# Patient Record
Sex: MALE | Race: WHITE | Employment: OTHER | URBAN - METROPOLITAN AREA
[De-identification: names, ages, dates, MRNs, and addresses within clinical notes are randomized per-mention and may not be internally consistent; named-entity substitution may affect disease eponyms.]

---

## 2018-06-14 ENCOUNTER — TRANSFERRED RECORDS (OUTPATIENT)
Dept: HEALTH INFORMATION MANAGEMENT | Facility: CLINIC | Age: 63
End: 2018-06-14

## 2018-06-19 ENCOUNTER — TRANSFERRED RECORDS (OUTPATIENT)
Dept: HEALTH INFORMATION MANAGEMENT | Facility: CLINIC | Age: 63
End: 2018-06-19

## 2018-06-29 ENCOUNTER — TELEPHONE (OUTPATIENT)
Dept: SLEEP MEDICINE | Facility: CLINIC | Age: 63
End: 2018-06-29

## 2018-06-29 NOTE — TELEPHONE ENCOUNTER
patient is calling to request an prescription for a new cpap machine. Please follow up with patient.

## 2018-06-29 NOTE — TELEPHONE ENCOUNTER
Last office visit: 01/15/2015  Contacted Ludin via telephone call.   His current durable medical equipment supplier is [employer]. Ludin received a [employer] machine on 12/02/2014 and is not yet eligible for a replacement machine. Ludin informed that he is due for an appointment with our sleep specialist. He is not interested in scheduling at this time.

## 2018-07-01 ENCOUNTER — ANESTHESIA EVENT (OUTPATIENT)
Dept: SURGERY | Facility: CLINIC | Age: 63
DRG: 470 | End: 2018-07-01
Payer: COMMERCIAL

## 2018-07-09 ENCOUNTER — APPOINTMENT (OUTPATIENT)
Dept: GENERAL RADIOLOGY | Facility: CLINIC | Age: 63
DRG: 470 | End: 2018-07-09
Attending: ORTHOPAEDIC SURGERY
Payer: COMMERCIAL

## 2018-07-09 ENCOUNTER — ANESTHESIA (OUTPATIENT)
Dept: SURGERY | Facility: CLINIC | Age: 63
DRG: 470 | End: 2018-07-09
Payer: COMMERCIAL

## 2018-07-09 ENCOUNTER — HOSPITAL ENCOUNTER (INPATIENT)
Facility: CLINIC | Age: 63
LOS: 3 days | Discharge: HOME | DRG: 470 | End: 2018-07-12
Attending: ORTHOPAEDIC SURGERY | Admitting: ORTHOPAEDIC SURGERY
Payer: COMMERCIAL

## 2018-07-09 DIAGNOSIS — H10.9 BACTERIAL CONJUNCTIVITIS OF LEFT EYE: ICD-10-CM

## 2018-07-09 DIAGNOSIS — Z96.641 STATUS POST TOTAL HIP REPLACEMENT, RIGHT: Primary | ICD-10-CM

## 2018-07-09 PROBLEM — M16.9 HIP ARTHROSIS: Status: ACTIVE | Noted: 2018-07-09

## 2018-07-09 PROCEDURE — 36000063 ZZH SURGERY LEVEL 4 EA 15 ADDTL MIN: Performed by: ORTHOPAEDIC SURGERY

## 2018-07-09 PROCEDURE — 25000128 H RX IP 250 OP 636: Performed by: NURSE ANESTHETIST, CERTIFIED REGISTERED

## 2018-07-09 PROCEDURE — 12000000 ZZH R&B MED SURG/OB

## 2018-07-09 PROCEDURE — C1776 JOINT DEVICE (IMPLANTABLE): HCPCS | Performed by: ORTHOPAEDIC SURGERY

## 2018-07-09 PROCEDURE — 71000012 ZZH RECOVERY PHASE 1 LEVEL 1 FIRST HR: Performed by: ORTHOPAEDIC SURGERY

## 2018-07-09 PROCEDURE — 37000008 ZZH ANESTHESIA TECHNICAL FEE, 1ST 30 MIN: Performed by: ORTHOPAEDIC SURGERY

## 2018-07-09 PROCEDURE — 25000125 ZZHC RX 250: Performed by: NURSE ANESTHETIST, CERTIFIED REGISTERED

## 2018-07-09 PROCEDURE — 27210794 ZZH OR GENERAL SUPPLY STERILE: Performed by: ORTHOPAEDIC SURGERY

## 2018-07-09 PROCEDURE — 25000132 ZZH RX MED GY IP 250 OP 250 PS 637: Performed by: PHYSICIAN ASSISTANT

## 2018-07-09 PROCEDURE — 37000009 ZZH ANESTHESIA TECHNICAL FEE, EACH ADDTL 15 MIN: Performed by: ORTHOPAEDIC SURGERY

## 2018-07-09 PROCEDURE — 25000125 ZZHC RX 250: Performed by: PHYSICIAN ASSISTANT

## 2018-07-09 PROCEDURE — 25000132 ZZH RX MED GY IP 250 OP 250 PS 637: Performed by: ORTHOPAEDIC SURGERY

## 2018-07-09 PROCEDURE — 25000128 H RX IP 250 OP 636: Performed by: PHYSICIAN ASSISTANT

## 2018-07-09 PROCEDURE — 0SR901A REPLACEMENT OF RIGHT HIP JOINT WITH METAL SYNTHETIC SUBSTITUTE, UNCEMENTED, OPEN APPROACH: ICD-10-PCS | Performed by: ORTHOPAEDIC SURGERY

## 2018-07-09 PROCEDURE — 25800025 ZZH RX 258: Performed by: ORTHOPAEDIC SURGERY

## 2018-07-09 PROCEDURE — 40000306 ZZH STATISTIC PRE PROC ASSESS II: Performed by: ORTHOPAEDIC SURGERY

## 2018-07-09 PROCEDURE — 40000986 XR PELVIS PORT 1/2 VW

## 2018-07-09 PROCEDURE — 27110028 ZZH OR GENERAL SUPPLY NON-STERILE: Performed by: ORTHOPAEDIC SURGERY

## 2018-07-09 PROCEDURE — 36000093 ZZH SURGERY LEVEL 4 1ST 30 MIN: Performed by: ORTHOPAEDIC SURGERY

## 2018-07-09 DEVICE — IMP HEAD FEMORAL DEPUY CERAMIC 36MM +1.5MM 1365-36-310: Type: IMPLANTABLE DEVICE | Site: HIP | Status: FUNCTIONAL

## 2018-07-09 DEVICE — IMPLANTABLE DEVICE: Type: IMPLANTABLE DEVICE | Site: HIP | Status: FUNCTIONAL

## 2018-07-09 RX ORDER — ONDANSETRON 4 MG/1
4 TABLET, ORALLY DISINTEGRATING ORAL EVERY 6 HOURS PRN
Status: DISCONTINUED | OUTPATIENT
Start: 2018-07-09 | End: 2018-07-12 | Stop reason: HOSPADM

## 2018-07-09 RX ORDER — ACETAMINOPHEN 325 MG/1
975 TABLET ORAL EVERY 8 HOURS
Status: COMPLETED | OUTPATIENT
Start: 2018-07-09 | End: 2018-07-12

## 2018-07-09 RX ORDER — PROPOFOL 10 MG/ML
INJECTION, EMULSION INTRAVENOUS PRN
Status: DISCONTINUED | OUTPATIENT
Start: 2018-07-09 | End: 2018-07-09

## 2018-07-09 RX ORDER — GABAPENTIN 300 MG/1
300 CAPSULE ORAL
Status: COMPLETED | OUTPATIENT
Start: 2018-07-09 | End: 2018-07-09

## 2018-07-09 RX ORDER — HYDROXYZINE HYDROCHLORIDE 25 MG/1
25 TABLET, FILM COATED ORAL EVERY 6 HOURS PRN
Status: DISCONTINUED | OUTPATIENT
Start: 2018-07-09 | End: 2018-07-12 | Stop reason: HOSPADM

## 2018-07-09 RX ORDER — ACETAMINOPHEN 325 MG/1
650 TABLET ORAL EVERY 4 HOURS PRN
Status: DISCONTINUED | OUTPATIENT
Start: 2018-07-12 | End: 2018-07-12 | Stop reason: HOSPADM

## 2018-07-09 RX ORDER — LIDOCAINE 40 MG/G
CREAM TOPICAL
Status: DISCONTINUED | OUTPATIENT
Start: 2018-07-09 | End: 2018-07-09 | Stop reason: HOSPADM

## 2018-07-09 RX ORDER — LIDOCAINE 40 MG/G
CREAM TOPICAL
Status: DISCONTINUED | OUTPATIENT
Start: 2018-07-09 | End: 2018-07-12 | Stop reason: HOSPADM

## 2018-07-09 RX ORDER — NALOXONE HYDROCHLORIDE 0.4 MG/ML
.1-.4 INJECTION, SOLUTION INTRAMUSCULAR; INTRAVENOUS; SUBCUTANEOUS
Status: DISCONTINUED | OUTPATIENT
Start: 2018-07-09 | End: 2018-07-12 | Stop reason: HOSPADM

## 2018-07-09 RX ORDER — AMOXICILLIN 250 MG
2 CAPSULE ORAL 2 TIMES DAILY
Status: DISCONTINUED | OUTPATIENT
Start: 2018-07-09 | End: 2018-07-12 | Stop reason: HOSPADM

## 2018-07-09 RX ORDER — PROPOFOL 10 MG/ML
INJECTION, EMULSION INTRAVENOUS CONTINUOUS PRN
Status: DISCONTINUED | OUTPATIENT
Start: 2018-07-09 | End: 2018-07-09

## 2018-07-09 RX ORDER — GABAPENTIN 300 MG/1
300 CAPSULE ORAL 2 TIMES DAILY
Status: COMPLETED | OUTPATIENT
Start: 2018-07-09 | End: 2018-07-12

## 2018-07-09 RX ORDER — BUPIVACAINE HYDROCHLORIDE 5 MG/ML
INJECTION, SOLUTION EPIDURAL; INTRACAUDAL PRN
Status: DISCONTINUED | OUTPATIENT
Start: 2018-07-09 | End: 2018-07-09

## 2018-07-09 RX ORDER — ONDANSETRON 2 MG/ML
4 INJECTION INTRAMUSCULAR; INTRAVENOUS EVERY 6 HOURS PRN
Status: DISCONTINUED | OUTPATIENT
Start: 2018-07-09 | End: 2018-07-12 | Stop reason: HOSPADM

## 2018-07-09 RX ORDER — MORPHINE SULFATE 2 MG/ML
2-4 INJECTION, SOLUTION INTRAMUSCULAR; INTRAVENOUS
Status: DISCONTINUED | OUTPATIENT
Start: 2018-07-09 | End: 2018-07-12 | Stop reason: HOSPADM

## 2018-07-09 RX ORDER — DEXTROSE MONOHYDRATE, SODIUM CHLORIDE, AND POTASSIUM CHLORIDE 50; 1.49; 4.5 G/1000ML; G/1000ML; G/1000ML
INJECTION, SOLUTION INTRAVENOUS CONTINUOUS
Status: DISCONTINUED | OUTPATIENT
Start: 2018-07-09 | End: 2018-07-11

## 2018-07-09 RX ORDER — SODIUM CHLORIDE, SODIUM LACTATE, POTASSIUM CHLORIDE, CALCIUM CHLORIDE 600; 310; 30; 20 MG/100ML; MG/100ML; MG/100ML; MG/100ML
INJECTION, SOLUTION INTRAVENOUS CONTINUOUS
Status: DISCONTINUED | OUTPATIENT
Start: 2018-07-09 | End: 2018-07-09 | Stop reason: HOSPADM

## 2018-07-09 RX ORDER — LIDOCAINE HYDROCHLORIDE 10 MG/ML
INJECTION, SOLUTION INFILTRATION; PERINEURAL PRN
Status: DISCONTINUED | OUTPATIENT
Start: 2018-07-09 | End: 2018-07-09

## 2018-07-09 RX ORDER — FENTANYL CITRATE 50 UG/ML
INJECTION, SOLUTION INTRAMUSCULAR; INTRAVENOUS PRN
Status: DISCONTINUED | OUTPATIENT
Start: 2018-07-09 | End: 2018-07-09

## 2018-07-09 RX ORDER — CEFAZOLIN SODIUM 1 G/50ML
1 INJECTION, SOLUTION INTRAVENOUS SEE ADMIN INSTRUCTIONS
Status: DISCONTINUED | OUTPATIENT
Start: 2018-07-09 | End: 2018-07-09 | Stop reason: HOSPADM

## 2018-07-09 RX ORDER — PROCHLORPERAZINE MALEATE 10 MG
10 TABLET ORAL EVERY 6 HOURS PRN
Status: DISCONTINUED | OUTPATIENT
Start: 2018-07-09 | End: 2018-07-12 | Stop reason: HOSPADM

## 2018-07-09 RX ORDER — AMOXICILLIN 250 MG
1 CAPSULE ORAL 2 TIMES DAILY
Status: DISCONTINUED | OUTPATIENT
Start: 2018-07-09 | End: 2018-07-12 | Stop reason: HOSPADM

## 2018-07-09 RX ORDER — OXYCODONE HYDROCHLORIDE 5 MG/1
5-10 TABLET ORAL
Status: DISCONTINUED | OUTPATIENT
Start: 2018-07-09 | End: 2018-07-12 | Stop reason: HOSPADM

## 2018-07-09 RX ORDER — EPINEPHRINE 1 MG/ML
INJECTION, SOLUTION, CONCENTRATE INTRAVENOUS PRN
Status: DISCONTINUED | OUTPATIENT
Start: 2018-07-09 | End: 2018-07-09

## 2018-07-09 RX ORDER — CEFAZOLIN SODIUM 2 G/100ML
2 INJECTION, SOLUTION INTRAVENOUS EVERY 8 HOURS
Status: COMPLETED | OUTPATIENT
Start: 2018-07-09 | End: 2018-07-10

## 2018-07-09 RX ORDER — ZOLPIDEM TARTRATE 5 MG/1
5 TABLET ORAL
Status: DISCONTINUED | OUTPATIENT
Start: 2018-07-10 | End: 2018-07-12 | Stop reason: HOSPADM

## 2018-07-09 RX ADMIN — OXYCODONE HYDROCHLORIDE 10 MG: 5 TABLET ORAL at 20:15

## 2018-07-09 RX ADMIN — BUPIVACAINE HYDROCHLORIDE 2.5 ML: 5 INJECTION, SOLUTION EPIDURAL; INTRACAUDAL; PERINEURAL at 12:30

## 2018-07-09 RX ADMIN — MIDAZOLAM HYDROCHLORIDE 3 MG: 1 INJECTION, SOLUTION INTRAMUSCULAR; INTRAVENOUS at 12:23

## 2018-07-09 RX ADMIN — TRANEXAMIC ACID 1 G: 100 INJECTION, SOLUTION INTRAVENOUS at 12:06

## 2018-07-09 RX ADMIN — MIDAZOLAM HYDROCHLORIDE 2 MG: 1 INJECTION, SOLUTION INTRAMUSCULAR; INTRAVENOUS at 12:32

## 2018-07-09 RX ADMIN — CEFAZOLIN SODIUM 3 G: 1 INJECTION, POWDER, FOR SOLUTION INTRAMUSCULAR; INTRAVENOUS at 12:21

## 2018-07-09 RX ADMIN — ACETAMINOPHEN 975 MG: 325 TABLET, FILM COATED ORAL at 15:57

## 2018-07-09 RX ADMIN — LIDOCAINE HYDROCHLORIDE 30 MG: 10 INJECTION, SOLUTION INFILTRATION; PERINEURAL at 12:30

## 2018-07-09 RX ADMIN — GABAPENTIN 300 MG: 300 CAPSULE ORAL at 11:18

## 2018-07-09 RX ADMIN — OXYCODONE HYDROCHLORIDE 5 MG: 5 TABLET ORAL at 17:15

## 2018-07-09 RX ADMIN — LIDOCAINE HYDROCHLORIDE 5 ML: 10 INJECTION, SOLUTION EPIDURAL; INFILTRATION; INTRACAUDAL; PERINEURAL at 11:16

## 2018-07-09 RX ADMIN — PROPOFOL 20 MG: 10 INJECTION, EMULSION INTRAVENOUS at 13:36

## 2018-07-09 RX ADMIN — EPINEPHRINE 0.2 MG: 1 INJECTION, SOLUTION INTRAMUSCULAR; SUBCUTANEOUS at 12:30

## 2018-07-09 RX ADMIN — SENNOSIDES AND DOCUSATE SODIUM 1 TABLET: 8.6; 5 TABLET ORAL at 20:15

## 2018-07-09 RX ADMIN — FENTANYL CITRATE 75 MCG: 50 INJECTION, SOLUTION INTRAMUSCULAR; INTRAVENOUS at 12:36

## 2018-07-09 RX ADMIN — SODIUM CHLORIDE, POTASSIUM CHLORIDE, SODIUM LACTATE AND CALCIUM CHLORIDE: 600; 310; 30; 20 INJECTION, SOLUTION INTRAVENOUS at 13:38

## 2018-07-09 RX ADMIN — SODIUM CHLORIDE, POTASSIUM CHLORIDE, SODIUM LACTATE AND CALCIUM CHLORIDE: 600; 310; 30; 20 INJECTION, SOLUTION INTRAVENOUS at 11:16

## 2018-07-09 RX ADMIN — POTASSIUM CHLORIDE, DEXTROSE MONOHYDRATE AND SODIUM CHLORIDE: 150; 5; 450 INJECTION, SOLUTION INTRAVENOUS at 16:00

## 2018-07-09 RX ADMIN — OXYCODONE HYDROCHLORIDE 10 MG: 5 TABLET ORAL at 23:07

## 2018-07-09 RX ADMIN — FENTANYL CITRATE 25 MCG: 50 INJECTION, SOLUTION INTRAMUSCULAR; INTRAVENOUS at 12:30

## 2018-07-09 RX ADMIN — PROPOFOL 75 MCG/KG/MIN: 10 INJECTION, EMULSION INTRAVENOUS at 12:34

## 2018-07-09 RX ADMIN — ACETAMINOPHEN 975 MG: 325 TABLET, FILM COATED ORAL at 23:07

## 2018-07-09 RX ADMIN — GABAPENTIN 300 MG: 300 CAPSULE ORAL at 20:15

## 2018-07-09 ASSESSMENT — ACTIVITIES OF DAILY LIVING (ADL)
AMBULATION: 0-->INDEPENDENT
TOILETING: 0-->INDEPENDENT
RETIRED_COMMUNICATION: 0-->UNDERSTANDS/COMMUNICATES WITHOUT DIFFICULTY
SWALLOWING: 0-->SWALLOWS FOODS/LIQUIDS WITHOUT DIFFICULTY
COGNITION: 0 - NO COGNITION ISSUES REPORTED
RETIRED_EATING: 0-->INDEPENDENT
DRESS: 0-->INDEPENDENT
BATHING: 0-->INDEPENDENT
TRANSFERRING: 0-->INDEPENDENT
FALL_HISTORY_WITHIN_LAST_SIX_MONTHS: NO

## 2018-07-09 NOTE — BRIEF OP NOTE
[facility]  Brief Operative Note      Pre-operative diagnosis: right hip ostearthritis   Post-operative diagnosis: Same   Procedure: Total hip arthoplasty (Right)   Surgeon: Ky Bartlett MD     Assistant(s): Gamaliel Moise PA-C   Anesthesia: Spinal Anesthesia   Estimated blood loss: 200 ml               Drains: None   Specimens: None       Findings: See full dictated operative note for details   Complications: None                   Comments: See dictated operative report for full details     Condition: Stable   Weight bearing status: Weight bearing as tolerated   Activity: Activity as tolerated  Patient may move about with assist as indicated or with supervision   Anticoagulation plan:                 Lovenox inpatient and then  mg daily at discharge  for 42 days  Follow up plan                           Follow up in 2 week(s)

## 2018-07-09 NOTE — IP AVS SNAPSHOT
[facility]    [address] [address]    Phone:  [phone]    Fax:  [phone]                                       After Visit Summary   7/9/2018    Ludin Lance    MRN: [id number]           After Visit Summary Signature Page     I have received my discharge instructions, and my questions have been answered. I have discussed any challenges I see with this plan with the nurse or doctor.    ..........................................................................................................................................  Patient/Patient Representative Signature      ..........................................................................................................................................  Patient Representative Print Name and Relationship to Patient    ..................................................               ................................................  Date                                            Time    ..........................................................................................................................................  Reviewed by Signature/Title    ...................................................              ..............................................  Date                                                            Time

## 2018-07-09 NOTE — PLAN OF CARE
Skin affirmation note    Admitting nurse completed full skin assessment, Ty score and Ty interventions. This writer agrees with the initial skin assessment findings.

## 2018-07-09 NOTE — PLAN OF CARE
"Problem: Patient Care Overview  Goal: Plan of Care/Patient Progress Review  Outcome: No Change  [place] [facility] ADMISSION NOTE    Patient admitted to room 2310 at approximately 1500 via cart from surgery. Patient was accompanied by spouse and other: hospital staff.     Verbal SBAR report received from TIFFANIE Avila prior to patient arrival.     Patient trasferred to bed via air deonte. Patient alert and oriented X 3. The patient is not having any pain.  . Admission vital signs: Blood pressure 139/80, temperature 97.6  F (36.4  C), temperature source Oral, resp. rate 14, height 1.778 m (5' 10\"), weight 138.3 kg (305 lb), SpO2 97 %. Patient was oriented to plan of care, call light, bed controls, tv, telephone, bathroom and visiting hours.     Risk Assessment    The following safety risks were identified during admission: fall. Yellow risk band applied: YES.     Skin Initial Assessment    This writer admitted this patient and completed a full skin assessment and Ty score in the Adult PCS flowsheet. Appropriate interventions initiated as needed.     Secondary skin check completed by TIFFANIE Silva.    Skin  Inspection of bony prominences: Procedural focused assessment (identify areas inspected)  Procedural focused assessment (identify areas inspected) :  (Right Hip)  Inspection under devices: Full except (identify device(s) not inspected)  Not Inspected under devices:  (right hip dressing)  Skin WDL: WDL    Ty Risk Assessment  Sensory Perception: 4-->no impairment  Moisture: 4-->rarely moist  Activity: 3-->walks occasionally  Mobility: 4-->no limitation  Nutrition: 4-->excellent  Friction and Shear: 3-->no apparent problem  Ty Score: 22  Bed Support Surface: Atmos Air mattress  Ty Intervention(s) Implemented: antiembolic/splint/device removed for skin assessment, draw sheets, encouraged fluids, HOB elevated 30 degrees or less, patient /family education on pressure injury prevention    Jessica Helm        "

## 2018-07-09 NOTE — ANESTHESIA PROCEDURE NOTES
Peripheral nerve/Neuraxial procedure note : intrathecal  Pre-Procedure  Performed by  GERARDO CASILLAS   Location: OR      Pre-Anesthestic Checklist: patient identified, IV checked, risks and benefits discussed, informed consent, monitors and equipment checked and pre-op evaluation    Timeout  Correct Patient: Yes   Correct Procedure: Yes   Correct Site: Yes   Correct Laterality: N/A   Correct Position: Yes   Site Marked: N/A   .   Procedure Documentation  ASA 2  .    Procedure:    Intrathecal.  Insertion Site:L3-4  (midline approach)      Patient Prep;mask, sterile gloves, povidone-iodine 7.5% surgical scrub, patient draped.  .  Needle: [place] [place] [facility] Spinal Needle (gauge): 22 (attempted 25ga, unable to reach CSF 2/2 body habitus)  Spinal/LP Needle Length (inches): 3.5 # of attempts: 1 and # of redirects:  Introducer used .       Assessment/Narrative  Paresthesias: No.  .  .  clear CSF fluid removed . Sensory Level: T6

## 2018-07-09 NOTE — OP NOTE
Procedure Date: 07/09/2018      DATE OF SURGERY:  07/09/2018      PREOPERATIVE DIAGNOSIS:  Primary osteoarthrosis, right hip.      POSTOPERATIVE DIAGNOSIS:  Primary osteoarthrosis, right hip.      PROCEDURE:  DePuy right total hip arthroplasty.      COMPONENTS:  Acetabulum 52 mm outside diameter, [place] ingrowth with Gription.  The liner is +4, ten degree Ultrex.  Stem is a #6 standard-offset [place] ingrowth.  Head and neck is +1.5/36 mm Biolox.      SURGEON:  Ky Bartlett MD      ASSISTANT:  Matt Moise PA-C      ANESTHESIA:  Spinal.      ESTIMATED BLOOD LOSS:  200 mL.      COMPLICATIONS:  None apparent.      INDICATIONS:  Ludin is an extremely pleasant 62-year-old gentleman who presented with end-stage primary osteoarthrosis of the right hip, recalcitrant to conservative treatment.  After alternatives, risks, and possible complications were carefully discussed, the patient desired surgical intervention; therefore, consent was obtained.      DESCRIPTION OF OPERATION:  The patient was brought to main operating, and after spinal anesthesia was obtained, placed in the left lateral decubitus position.  Three  grams of Ancef were given intravenously.  Right lower extremity was prepped and draped in the usual sterile fashion.      A curvilinear incision was made for posterolateral approach to the right hip.  Subcutaneous tissue was divided with cautery, deep fascia incised in line with the incision and East-West retractor was placed.  I then released the external rotators, incised the capsule and dislocated the femoral head.  I then resected the capital fragment at the appropriate level, removing it from the operative field.      With retractors in place around the acetabulum, I resected out the remains of the capsule labral tissue and then sequentially reamed to 51 mm, excellent circumferential bleeding bone.  Therefore, I selected a 52 mm cup, impacted it into place with appropriate anteversion and coverage and  superb stability.  Neutral trial liner was placed.      We then reamed and broached the femoral canal for a #6 standard offset stem and had good stability.  However, secondary to size, I felt we had even better stability with a posterior lipped liner.  Therefore, I removed trial components, pulse lavaged the surfaces clean and impacted the posterior lipped liner into place, verifying that all tabs had seated appropriately.  I then impacted the stem solidly down on the calcar, again with a solid fit.  Again performed trial reduction with the +1.5 head and neck combination.  Therefore, the ball was impacted on the cleaned [place] taper and the hip was reduced.      Leg lengths were equal, unable to dislocate in extension, external rotation and 90 degrees of flexion, 20 degrees of adduction and was approximately 80 degrees before it began to ride out of the cup.  Therefore, final consent reduction was performed, pulse lavaged the surfaces clean, and soaked the hip in with a weak Betadine solution.  Final pulse lavage was performed.  Removed retractors, closed the fascia with #1 Stratafix.  Then, we closed the subcutaneous tissue with 2-0 Stratafix, completed closure with 3-0 Stratafix.  Prineo was placed over the wound followed by a sterile compression bandage and a hip abduction pillow.  The patient was turned supine, returned to PAR in stable condition, without apparent complication.         SILVANO SOARES MD             D: 2018   T: 2018   MT: CARLOS ALBERTO      Name:     RUTH LOPEZ   MRN:      [id number]        Account:        [id number]   :      1955           Procedure Date: 2018      Document: [id number]

## 2018-07-09 NOTE — PLAN OF CARE
Problem: Patient Care Overview  Goal: Plan of Care/Patient Progress Review  Outcome: No Change  Patient tolerated regular dinner tray and denied any nausea. Patient has not been out of bed yet, due to awaiting sensation in lower legs. Also awaiting first void after surgery; RN discussed with patient to try voiding at 1900.     Problem: Hip Arthroplasty (Total, Partial) (Adult)  Goal: Signs and Symptoms of Listed Potential Problems Will be Absent, Minimized or Managed (Hip Arthroplasty)  Signs and symptoms of listed potential problems will be absent, minimized or managed by discharge/transition of care (reference Hip Arthroplasty (Total, Partial) (Adult) CPG).   Outcome: No Change  Patient arrived to the floor around 1500. POD #0. Dressing to right hip is intact with small bloody drainage that was outlined. Vital signs stable. Capnography on. Patient demonstrated use of incentive spirometer and obtained 2000mL. Patient reports numbness from the mid-thigh and down; unable to wiggle toes. Hip abductor pillow in place. Patient reported pain at the incision site rated a 3/10. Scheduled tylenol and PRN oxycodone given; ice applied.

## 2018-07-09 NOTE — IP AVS SNAPSHOT
MRN:[id number]                      After Visit Summary   7/9/2018    Ludin Lance    MRN: [id number]           Thank you!     Thank you for choosing [facility] for your care. Our goal is always to provide you with excellent care. Hearing back from our patients is one way we can continue to improve our services. Please take a few minutes to complete the written survey that you may receive in the mail after you visit with us. Thank you!        Patient Information     Date Of Birth          1955        Designated Caregiver       Most Recent Value    Caregiver    Will someone help with your care after discharge? yes    Name of designated caregiver Sally [wife]    Phone number of caregiver [phone]     Caregiver address same      About your hospital stay     You were admitted on:  July 9, 2018 You last received care in the:  [facility]    You were discharged on:  July 12, 2018        Reason for your hospital stay       Right total hip arthroplasty                  Who to Call     For medical emergencies, please call 911.  For non-urgent questions about your medical care, please call your primary care provider or clinic, [phone]  For questions related to your surgery, please call your surgery clinic        Attending Provider     Provider Ky Stevenson MD Orthopaedic Surgery       Primary Care Provider Office Phone # Fax #    [facility] [phone]       When to contact your care team       Call your Orthopedic surgeon at [facility]  if you have any of the following: temperature greater than 100.4,  increased shortness of breath, increased drainage, increased swelling or increased pain.                  After Care Instructions     Activity       Your activity upon discharge:   Activity as tolerated, no driving until off narcotic pain medication. You may return to work when cleared by your orthopedic surgeon  or physician assistant.   You may weight bear as tolerated on your affected extremity.    Total hip precautions to be as follows for 6-8 weeks or until notified by your surgeon or surgical team: no hip flexion beyond 90 degrees. You may flex your hip to the needs of sitting and other activities of daily living. Avoid crossing the affected hip across the midline of your body, do not sit with your legs crossed. No deep squatting or lifting.            Diet       Follow this diet upon discharge: Orders Placed This Encounter      Advance Diet as Tolerated: Regular Diet Adult              Wound care and dressings       Instructions to care for your wound at home: as directed, daily dressing changes, ice to area for comfort, keep wound clean and dry and may get incision wet in shower but do not soak or scrub. Prenio dressing to remain intact until follow up.                  Follow-up Appointments     Follow-up and recommended labs and tests       Follow up with  Gamaliel Lopez PA-C at  [facility], within 2 weeks to evaluate after surgery and for hospital follow- up.                  Additional Services     Physical Therapy Referral       *This therapy referral will be filtered to a centralized scheduling office at [facility] and the patient will receive a call to schedule an appointment at a [facility] location most convenient for them. *     [facility] provides Physical Therapy evaluation and treatment and many specialty services across the [facility] system.  If requesting a specialty program, please choose from the list below.    If you have not heard from the scheduling office within 2 business days, please call [phone] for all locations, with the exception of [place], please call [phone].  Treatment: Evaluation & Treatment  Special Instructions/Modalities: none  Special Programs: None    Please be aware that coverage of these services is subject to the  "terms and limitations of your health insurance plan.  Call member services at your health plan with any benefit or coverage questions.      **Note to Provider:  If you are referring outside of [place] for the therapy appointment, please list the name of the location in the \"special instructions\" above, print the referral and give to the patient to schedule the appointment.                  Further instructions from your care team       Orthopedic Surgery Discharge Instructions    1. Follow up:  Follow up with Gamaliel Lopez PA-C.  in 2 weeks for post op check and x rays as scheduled.  Call [phone] if appointment needed or questions. If you have questions or concerns while at home, please call [facility]. If it is an emergency, call [phone]. You may find the answers to questions in the included discharge packet from the hospital or your pre operative packet you received in clinic prior to surgery.    2. Pain Medication:  Use pain medication as directed. If you are prescribed narcotic pain medications (Oxycodone, Norco, Percocet, Tylenol #3, Dilaudid, or Tramadol) then you should try to wean off of them as tolerated. These are an AS NEEDED medication, so if you are not having significant pain you should try to take fewer pills at a time or spread out the doses out over a longer period of time than is written on the prescription. You should not drive a car or operate machinery if you are taking prescribed narcotic pain medication. You may not receive a refill on this medication by your surgical team until your follow up appointment, so try to wean off your narcotics as soon as possible. If you need a refill on this medication you may call your surgical team to discuss during business hours. Refills are not given on the weekend under any circumstances, so plan accordingly.    3. How to wean narcotics: Within 2-3 days of surgery you should be able to begin weaning your pain medications. If upon leaving the " hospital you are taking 2 tabs every 3-4 hours, you should decrease this to 1 tab every 3-4 hours. Around 4-5 days after surgery you should begin decreasing the frequency of your pain medication to every 4-5 hours. You may also cut your pills in half to decrease the dose as you begin the weaning process. Create a weaning schedule of your pain medication when you get home from the hospital, you may be expected to make the prescription last until your next appointment. Call your surgical team during business hours to discuss your pain medication if you have questions or concerns about the weaning process.    4. Tylenol and pain medications: If your pain pill contains Tylenol (i.e. Percocet, Norco, Tylenol #3) and you are also taking additional Tylenol/acetaminophen as a pain medication, ensure that you are not taking too much tylenol. Prescription narcotic medications that contain Tylenol usually have 325mg of Tylenol per pill and over-the-counter medications have variable doses of Tylenol. You should not exceed 4,000mg of Tylenol in a 24-hour period. Tylenol should be used in combination with your pain medication, if able, to help reduce the amount of pain medication you are taking.    5. NSAIDs (anti inflammatory medications): You may take NSAIDs to help control your pain at home.  NSAIDs are Ibuprofen, Motrin, Advil, Aleve, Naprosyn etc. You should use over the counter medications such as NSAIDs and Tylenol to wean off narcotics. If you are also taking Aspirin for blood clot prevention, you should use caution with NSAIDs as this may cause issues such as GI bleeding, upset stomach, and dark stools. Recommended doses of NSAIDs after surgery are 1-2 tabs every 6-8 hours, not to exceed 600 mg per dose. It is best to rotate Tylenol and NSAIDs if needed every 4 hours. Use these medications in between your narcotics to help reduce the amount of pain medication needed.      6. How to take over the counter medications with  pain medications:  Sample medication schedule:   8 am: Pain medication  10 am: Tylenol 500-650 mg (skip if your pain medication contains tylenol, refer to #4)  12 pm: Pain medication  2 pm: NSAIDs 1-2 tabs, not to exceeded 600 mg  4 pm: Pain medication  6 pm: Tylenol 500-650 mg (skip if your pain medication contains tylenol, refer to #4)  8 pm: Pain medication    7. Applying ice to your incision: ice can provide additional pain relief at home. Apply  ice in 20 minute intervals. Allow your skin to warm up to room temperature, approximately 40 minutes, before applying another ice pack.    8. Incision instructions:  Keep your incision clean, covered and dry until your post op appointment.  You may shower and get the incision wet if no drainage is present.  You may change your dressing as needed.   Only use dry  guaze over Prineo glue tape.    9. Physical therapy:  Continue physical therapy as soon as possible.  You will need to call a therapy department of your choice to arrange future appointments.  Your order for physical therapy is included in your discharge paperwork.     10. Blood Clot Prevention: Take Aspirin 325 mg  daily  for 42 days for anticoagulation. If you develop abdominal pain or have signs of bleeding - blood in stool or black stools, stop taking the medication and seek medical care. Walk often at home, walking will help reduce the risk of blood clots. If you have been prescribed dariusz stockings or compression stockings, wear them during the day until you follow up with your orthopedic team in clinic. If you were not given Dariusz Stockings in the hospital but would like them, they can be purchased over the counter at your local pharmacy.     11. Call the office if you have any questions/concerns or are experiencing the following:  - increasing drainage from the incision  - foul-smelling or malodorous drainage from the incision  - sudden increase or change in pain that is not controlled by your prescribed  "medications  - inability to urinate  - new onset of weakness, numbness or severe pain in the extremities  - bowel/bladder incontinence  - Fever greater than 101.5 degrees  - Nausea/vomitting causing inability to eat food or medications    12. Total hip precautions: After your total hip replacement, you have been given hip precautions. Your surgical team will give you clearance when to return to normal activity at your follow up appointment. Hip precautions include: no bending at the waist greater than 90 degrees or more than what is necessary to sit in a chair or on the toilet. Avoid crossing your legs while sitting or lying in bed. Sleep with a pillow between your legs at night for the first two weeks. No deep squatting or bending, and avoid heavy lifting.         Pending Results     No orders found from 7/7/2018 to 7/10/2018.            Statement of Approval     Ordered          07/12/18 1205  I have reviewed and agree with all the recommendations and orders detailed in this document.  EFFECTIVE NOW     Approved and electronically signed by:  Coni Puckett PA-C             Admission Information     Date & Time Provider Department Dept. Phone    7/9/2018 Ky Bartlett MD [facility] [phone]      Your Vitals Were     Blood Pressure Pulse Temperature Respirations Height Weight    106/65 (BP Location: Right arm) 78 99.5  F (37.5  C) (Oral) 20 1.778 m (5' 10\") 138.3 kg (305 lb)    Pulse Oximetry BMI (Body Mass Index)                94% 43.76 kg/m2          [employer] Information     [employer] lets you send messages to your doctor, view your test results, renew your prescriptions, schedule appointments and more. To sign up, go to [email or web address] . Click on \"Log in\" on the left side of the screen, which will take you to the Welcome page. Then click on \"Sign up Now\" on the right side of the page.     You will be asked to enter the access code listed below, as well as some " personal information. Please follow the directions to create your username and password.     Your access code is: [email or web address]  Expires: 10/10/2018  1:06 PM     Your access code will  in 90 days. If you need help or a new code, please call your [facility] clinic or [phone].        Care EveryWhere ID     This is your Care EveryWhere ID. This could be used by other organizations to access your [facility] medical records  [id number]        Equal Access to Services     [facility] HospitalJED : Hadii aad ku hadasho Soomaali, waaxda luqadaha, qaybta kaalmada adeegyada, waxay arnaldo chapa . So [facility] [phone].    ATENCIÓN: Si habla español, tiene a hernandes disposición servicios gratuitos de asistencia lingüística. Ll[facility], [place] [phone].    We comply with applicable federal civil rights laws and [place] laws. We do not discriminate on the basis of race, color, national origin, age, disability, sex, sexual orientation, or gender identity.               Review of your medicines      START taking        Dose / Directions    acetaminophen 325 MG tablet   Commonly known as:  TYLENOL   Used for:  Status post total hip replacement, right        Dose:  650 mg   Take 2 tablets (650 mg) by mouth every 4 hours as needed for mild pain   Quantity:  100 tablet   Refills:  0       aspirin 325 MG EC tablet   Used for:  Status post total hip replacement, right   Notes to Patient:  Start taking 18        Dose:  325 mg   Take 1 tablet (325 mg) by mouth daily   Quantity:  42 tablet   Refills:  0       hydrOXYzine 25 MG tablet   Commonly known as:  ATARAX   Used for:  Status post total hip replacement, right        Dose:  25 mg   Take 1 tablet (25 mg) by mouth every 6 hours as needed for itching or other (pain)   Quantity:  20 tablet   Refills:  0       order for DME   Used for:  Status post total hip replacement, right        Equipment being ordered: Walker Wheels () and Walker () Treatment Diagnosis: s/p THANH    Quantity:  1 Units   Refills:  0       oxyCODONE IR 5 MG tablet   Commonly known as:  ROXICODONE   Used for:  Status post total hip replacement, right        Dose:  5-10 mg   Take 1-2 tablets (5-10 mg) by mouth every 3 hours as needed for moderate to severe pain   Quantity:  40 tablet   Refills:  0       senna-docusate 8.6-50 MG per tablet   Commonly known as:  SENOKOT-S;PERICOLACE   Used for:  Status post total hip replacement, right        Dose:  2 tablet   Take 2 tablets by mouth daily as needed for constipation   Quantity:  100 tablet   Refills:  0       sulfacetamide 10 % ophthalmic solution   Commonly known as:  BLEPH-10        Dose:  1 drop   Place 1 drop Into the left eye every 4 hours (while awake) for 5 days   Quantity:  2 mL   Refills:  0         CONTINUE these medicines which have NOT CHANGED        Dose / Directions    order for DME        Equipment being ordered: Respironics Auto CPAP pressure 7-12 .   Refills:  0            Where to get your medicines      These medications were sent to [place], [place] - [address]  [address] [address]     Phone:  [phone]     acetaminophen 325 MG tablet    aspirin 325 MG EC tablet    hydrOXYzine 25 MG tablet    senna-docusate 8.6-50 MG per tablet    sulfacetamide 10 % ophthalmic solution         Some of these will need a paper prescription and others can be bought over the counter. Ask your nurse if you have questions.     Bring a paper prescription for each of these medications     order for DME    oxyCODONE IR 5 MG tablet                Protect others around you: Learn how to safely use, store and throw away your medicines at www.disposemymeds.org.        Information about OPIOIDS     PRESCRIPTION OPIOIDS: WHAT YOU NEED TO KNOW   We gave you an opioid (narcotic) pain medicine. It is important to manage your pain, but opioids are not always the best choice. You should first try all the other options your care  team gave you. Take this medicine for as short a time (and as few doses) as possible.     These medicines have risks:    DO NOT drive when on new or higher doses of pain medicine. These medicines can affect your alertness and reaction times, and you could be arrested for driving under the influence (DUI). If you need to use opioids long-term, talk to your care team about driving.    DO NOT operate heave machinery    DO NOT do any other dangerous activities while taking these medicines.     DO NOT drink any alcohol while taking these medicines.      If the opioid prescribed includes acetaminophen, DO NOT take with any other medicines that contain acetaminophen. Read all labels carefully. Look for the word  acetaminophen  or  Tylenol.  Ask your pharmacist if you have questions or are unsure.    You can get addicted to pain medicines, especially if you have a history of addiction (chemical, alcohol or substance dependence). Talk to your care team about ways to reduce this risk.    Store your pills in a secure place, locked if possible. We will not replace any lost or stolen medicine. If you don t finish your medicine, please throw away (dispose) as directed by your pharmacist. The [place] Pollution Control Agency has more information about safe disposal: https://www.pca.[facility].[place].us/living-green/managing-unwanted-medications.     All opioids tend to cause constipation. Drink plenty of water and eat foods that have a lot of fiber, such as fruits, vegetables, prune juice, apple juice and high-fiber cereal. Take a laxative (Miralax, milk of magnesia, Colace, Senna) if you don t move your bowels at least every other day.              Medication List: This is a list of all your medications and when to take them. Check marks below indicate your daily home schedule. Keep this list as a reference.      Medications           Morning Afternoon Evening Bedtime As Needed    acetaminophen 325 MG tablet   Commonly known as:   TYLENOL   Take 2 tablets (650 mg) by mouth every 4 hours as needed for mild pain   Last time this was given:  975 mg on 7/12/2018  6:59 AM                                   aspirin 325 MG EC tablet   Take 1 tablet (325 mg) by mouth daily   Next Dose Due:  7/13/18   Notes to Patient:  Start taking 7/13/18                                   hydrOXYzine 25 MG tablet   Commonly known as:  ATARAX   Take 1 tablet (25 mg) by mouth every 6 hours as needed for itching or other (pain)                                   order for DME   Equipment being ordered: Respironics Auto CPAP pressure 7-12 .                                order for DME   Equipment being ordered: Walker Wheels () and Walker () Treatment Diagnosis: s/p THANH                                oxyCODONE IR 5 MG tablet   Commonly known as:  ROXICODONE   Take 1-2 tablets (5-10 mg) by mouth every 3 hours as needed for moderate to severe pain   Last time this was given:  10 mg on 7/12/2018 11:47 AM                            Next dose available at 2:47 pm as needed.       senna-docusate 8.6-50 MG per tablet   Commonly known as:  SENOKOT-S;PERICOLACE   Take 2 tablets by mouth daily as needed for constipation   Last time this was given:  2 tablets on 7/12/2018  8:31 AM                                   sulfacetamide 10 % ophthalmic solution   Commonly known as:  BLEPH-10   Place 1 drop Into the left eye every 4 hours (while awake) for 5 days   Next Dose Due:  New start taking today 7/12/18

## 2018-07-09 NOTE — ANESTHESIA CARE TRANSFER NOTE
Patient: Ludin Lance    Procedure(s):  Right Total Hip Arthroplasty - Wound Class: I-Clean    Diagnosis: right hip ostearthritis  Diagnosis Additional Information: No value filed.    Anesthesia Type:   Spinal     Note:  Airway :Room Air  Patient transferred to:PACU  Handoff Report: Identifed the Patient, Identified the Reponsible Provider, Reviewed the pertinent medical history, Discussed the surgical course, Reviewed Intra-OP anesthesia mangement and issues during anesthesia, Set expectations for post-procedure period and Allowed opportunity for questions and acknowledgement of understanding      Vitals: (Last set prior to Anesthesia Care Transfer)    CRNA VITALS  7/9/2018 1325 - 7/9/2018 1359      7/9/2018             Pulse: 61    SpO2: 99 %                Electronically Signed By: LEE Eckert CRNA  July 9, 2018  1:59 PM

## 2018-07-09 NOTE — ANESTHESIA PREPROCEDURE EVALUATION
Anesthesia Evaluation     . Pt has had prior anesthetic. Type: General, Regional and MAC    No history of anesthetic complications          ROS/MED HX    ENT/Pulmonary:     (+)sleep apnea, uses CPAP , . .    Neurologic:  - neg neurologic ROS     Cardiovascular: Comment: Denies CP    (+) ----. : . . . :. . Previous cardiac testing date:results:date: results:ECG reviewed date:2018 results:SB date: results:          METS/Exercise Tolerance:  >4 METS   Hematologic:  - neg hematologic  ROS       Musculoskeletal:   (+) arthritis, , , -       GI/Hepatic:  - neg GI/hepatic ROS       Renal/Genitourinary:  - ROS Renal section negative       Endo:  - neg endo ROS       Psychiatric:  - neg psychiatric ROS       Infectious Disease:  - neg infectious disease ROS       Malignancy:      - no malignancy   Other:    - neg other ROS                 Physical Exam  Normal systems: cardiovascular, pulmonary and dental    Airway   Mallampati: I  TM distance: >3 FB  Neck ROM: full    Dental     Cardiovascular   Rhythm and rate: regular and normal      Pulmonary    breath sounds clear to auscultation                    Anesthesia Plan      History & Physical Review  History and physical reviewed and following examination; no interval change.    ASA Status:  2 .    NPO Status:  > 8 hours    Plan for Spinal   PONV prophylaxis:  Ondansetron (or other 5HT-3) and Dexamethasone or Solumedrol       Postoperative Care  Postoperative pain management:  IV analgesics and Oral pain medications.      Consents  Anesthetic plan, risks, benefits and alternatives discussed with:  Patient..                          .

## 2018-07-09 NOTE — PROGRESS NOTES
[facility] NSG DISCHARGE NOTE    Patient discharged to home at 5:29 PM via ambulation. Accompanied by self. Discharge instructions reviewed with patient, opportunity offered to ask questions. Prescriptions - None ordered for discharge. All belongings sent with patient.    Jessica Helm

## 2018-07-09 NOTE — ANESTHESIA POSTPROCEDURE EVALUATION
Patient: Ludin Lance    Procedure(s):  Right Total Hip Arthroplasty - Wound Class: I-Clean    Diagnosis:right hip ostearthritis  Diagnosis Additional Information: No value filed.    Anesthesia Type:  Spinal    Note:  Anesthesia Post Evaluation    Patient location during evaluation: Bedside  Patient participation: Able to participate in evaluation but full recovery from regional anesthesia has not yet ocurrred but is anticipated to occur within 48 hours  Level of consciousness: awake and alert  Pain management: adequate  Airway patency: patent  Cardiovascular status: acceptable  Respiratory status: acceptable  Hydration status: acceptable  PONV: none     Anesthetic complications: None          Last vitals:  Vitals:    07/09/18 1050   BP: 132/85   Resp: 20   Temp: 36.8  C (98.2  F)   SpO2: 98%         Electronically Signed By: LEE Eckert CRNA  July 9, 2018  2:05 PM

## 2018-07-10 ENCOUNTER — APPOINTMENT (OUTPATIENT)
Dept: PHYSICAL THERAPY | Facility: CLINIC | Age: 63
DRG: 470 | End: 2018-07-10
Attending: ORTHOPAEDIC SURGERY
Payer: COMMERCIAL

## 2018-07-10 ENCOUNTER — APPOINTMENT (OUTPATIENT)
Dept: OCCUPATIONAL THERAPY | Facility: CLINIC | Age: 63
DRG: 470 | End: 2018-07-10
Attending: ORTHOPAEDIC SURGERY
Payer: COMMERCIAL

## 2018-07-10 LAB
CREAT SERPL-MCNC: 1 MG/DL (ref 0.66–1.25)
GFR SERPL CREATININE-BSD FRML MDRD: 75 ML/MIN/1.7M2
HGB BLD-MCNC: 11.1 G/DL (ref 13.3–17.7)
PLATELET # BLD AUTO: 119 10E9/L (ref 150–450)

## 2018-07-10 PROCEDURE — 99231 SBSQ HOSP IP/OBS SF/LOW 25: CPT

## 2018-07-10 PROCEDURE — 97165 OT EVAL LOW COMPLEX 30 MIN: CPT | Mod: GO

## 2018-07-10 PROCEDURE — 85018 HEMOGLOBIN: CPT | Performed by: ORTHOPAEDIC SURGERY

## 2018-07-10 PROCEDURE — 97116 GAIT TRAINING THERAPY: CPT | Mod: GP | Performed by: PHYSICAL THERAPIST

## 2018-07-10 PROCEDURE — 85049 AUTOMATED PLATELET COUNT: CPT | Performed by: ORTHOPAEDIC SURGERY

## 2018-07-10 PROCEDURE — 97161 PT EVAL LOW COMPLEX 20 MIN: CPT | Mod: GP | Performed by: PHYSICAL THERAPIST

## 2018-07-10 PROCEDURE — 82565 ASSAY OF CREATININE: CPT | Performed by: ORTHOPAEDIC SURGERY

## 2018-07-10 PROCEDURE — 25000132 ZZH RX MED GY IP 250 OP 250 PS 637: Performed by: ORTHOPAEDIC SURGERY

## 2018-07-10 PROCEDURE — 36415 COLL VENOUS BLD VENIPUNCTURE: CPT | Performed by: ORTHOPAEDIC SURGERY

## 2018-07-10 PROCEDURE — 25000128 H RX IP 250 OP 636: Performed by: ORTHOPAEDIC SURGERY

## 2018-07-10 PROCEDURE — 97535 SELF CARE MNGMENT TRAINING: CPT | Mod: GO

## 2018-07-10 PROCEDURE — 40000133 ZZH STATISTIC OT WARD VISIT

## 2018-07-10 PROCEDURE — 97110 THERAPEUTIC EXERCISES: CPT | Mod: GP | Performed by: PHYSICAL THERAPIST

## 2018-07-10 PROCEDURE — 40000193 ZZH STATISTIC PT WARD VISIT: Performed by: PHYSICAL THERAPIST

## 2018-07-10 PROCEDURE — 12000000 ZZH R&B MED SURG/OB

## 2018-07-10 RX ADMIN — SENNOSIDES AND DOCUSATE SODIUM 1 TABLET: 8.6; 5 TABLET ORAL at 08:27

## 2018-07-10 RX ADMIN — SENNOSIDES AND DOCUSATE SODIUM 2 TABLET: 8.6; 5 TABLET ORAL at 20:24

## 2018-07-10 RX ADMIN — GABAPENTIN 300 MG: 300 CAPSULE ORAL at 08:27

## 2018-07-10 RX ADMIN — OXYCODONE HYDROCHLORIDE 10 MG: 5 TABLET ORAL at 22:09

## 2018-07-10 RX ADMIN — ACETAMINOPHEN 975 MG: 325 TABLET, FILM COATED ORAL at 06:50

## 2018-07-10 RX ADMIN — GABAPENTIN 300 MG: 300 CAPSULE ORAL at 20:24

## 2018-07-10 RX ADMIN — MORPHINE SULFATE 2 MG: 2 INJECTION, SOLUTION INTRAMUSCULAR; INTRAVENOUS at 00:32

## 2018-07-10 RX ADMIN — OXYCODONE HYDROCHLORIDE 10 MG: 5 TABLET ORAL at 09:54

## 2018-07-10 RX ADMIN — MORPHINE SULFATE 2 MG: 2 INJECTION, SOLUTION INTRAMUSCULAR; INTRAVENOUS at 23:15

## 2018-07-10 RX ADMIN — ACETAMINOPHEN 975 MG: 325 TABLET, FILM COATED ORAL at 15:01

## 2018-07-10 RX ADMIN — OXYCODONE HYDROCHLORIDE 10 MG: 5 TABLET ORAL at 18:57

## 2018-07-10 RX ADMIN — ENOXAPARIN SODIUM 40 MG: 40 INJECTION SUBCUTANEOUS at 12:56

## 2018-07-10 RX ADMIN — OXYCODONE HYDROCHLORIDE 10 MG: 5 TABLET ORAL at 15:55

## 2018-07-10 RX ADMIN — CEFAZOLIN SODIUM 2 G: 2 INJECTION, SOLUTION INTRAVENOUS at 00:30

## 2018-07-10 RX ADMIN — ACETAMINOPHEN 975 MG: 325 TABLET, FILM COATED ORAL at 23:16

## 2018-07-10 RX ADMIN — CEFAZOLIN SODIUM 2 G: 2 INJECTION, SOLUTION INTRAVENOUS at 08:29

## 2018-07-10 RX ADMIN — OXYCODONE HYDROCHLORIDE 10 MG: 5 TABLET ORAL at 12:55

## 2018-07-10 RX ADMIN — OXYCODONE HYDROCHLORIDE 10 MG: 5 TABLET ORAL at 06:50

## 2018-07-10 NOTE — PROGRESS NOTES
Patient lost IV access. Writer unable to attain access; charge able to get access. Ancef retimed due to this.

## 2018-07-10 NOTE — PLAN OF CARE
Problem: Patient Care Overview  Goal: Plan of Care/Patient Progress Review  Discharge Planner OT   Patient plan for discharge: Home with spouse    Current status: Pt SBA for ADLs using FWW and reacher. Has reacher and RTS to use upon discharge and practiced using prior to surgery.     Barriers to return to prior living situation: None- verbalizes understanding to hip precautions and has supportive spouse    Recommendations for discharge: Home with spouse    Rationale for recommendations: Met all IP OT goals.     Occupational Therapy Discharge Summary    Reason for therapy discharge:    All goals and outcomes met, no further needs identified.    Progress towards therapy goal(s). See goals on Care Plan in [facility] electronic health record for goal details.  Goals met    Therapy recommendation(s):    No further OT needs identified at this time.            Entered by: Abigail Gooden 07/10/2018 10:32 AM

## 2018-07-10 NOTE — PROGRESS NOTES
"[facility] Orthopaedics Progress Note      Post-operative Day: 1 Day Post-Op    Procedure(s):  Right Total Hip Arthroplasty - Wound Class: I-Clean      Subjective:    Pain: moderate  aching to R hip. Denies shooting pain, parasthesias, numbness and weakness.   denies Chest pain, SOB, O2 required: None  denies nausea/ emesis  reports lightheadedness which is intermittent and not related to position, lasts 15-20 min. Patient reports may be from medication but is okay with it.   BM -, passing gas +. Urinating well, Phillips in place: no.       Objective:  Blood pressure 111/65, pulse 83, temperature 98.8  F (37.1  C), temperature source Oral, resp. rate 16, height 1.778 m (5' 10\"), weight 138.3 kg (305 lb), SpO2 98 %.    Patient Vitals for the past 24 hrs:   BP Temp Temp src Pulse Heart Rate Resp SpO2   07/10/18 0749 111/65 98.8  F (37.1  C) Oral 83 - 16 98 %   07/10/18 0513 117/57 98.5  F (36.9  C) Oral 77 - 16 96 %   07/09/18 2355 102/67 97.8  F (36.6  C) Oral - 63 18 95 %   07/09/18 1917 124/76 98  F (36.7  C) Oral - 63 18 94 %   07/09/18 1801 118/87 - - - 66 14 95 %   07/09/18 1705 117/76 - - - 55 14 96 %   07/09/18 1555 118/76 - - - - - -   07/09/18 1545 (!) 171/99 - - - - - 97 %   07/09/18 1530 139/80 - - - 53 - -   07/09/18 1515 142/90 - - - 50 - 97 %   07/09/18 1505 131/83 97.6  F (36.4  C) Oral - 62 14 95 %   07/09/18 1430 - - - - 69 10 95 %   07/09/18 1420 105/69 - - - 60 22 96 %   07/09/18 1415 (!) 83/70 - - - 51 (!) 6 96 %   07/09/18 1405 96/53 97.5  F (36.4  C) Axillary - - 14 98 %   07/09/18 1400 100/51 - - - - - -       Wt Readings from Last 4 Encounters:   07/09/18 138.3 kg (305 lb)   01/15/15 (!) 136.5 kg (301 lb)   12/02/14 135.2 kg (298 lb)   11/24/14 135.2 kg (298 lb)     General: Alert and orientated. No apparent distress. Non-labored breathing. Appropriate affect.   MSK: R hip: dressing mild bloody drainage from proximal most end of incision.. Skin intact, no ecchymosis, no erythema. nontender to " palpation to incision site. ROM appropriate for post op. Distal neurovascularly intact. Compartments soft and non-tender. No calf pain. Homans negative. PF/DF and EHL intact.       Pertinent Labs   Lab Results: personally reviewed.     Recent Labs   Lab Test  07/10/18   0542   HGB  11.1*   PLT  119*         Procedure(s):  Right Total Hip Arthroplasty - Wound Class: I-Clean  Plan: Anticoagulation protocol: Lovenox inpatient and then  mg daily at discharge  x 42  days            Pain medications:  oxycodone and tylenol            Weight bearing status:  WBAT            Dressing Change:  sterile dry dressing change with gauze. Prineo to remain intact until follow up.            Disposition:  Home in 1-2 days             Follow up: 2 week with TANNA            Continue cares and rehabilitation     Report completed by:  Coni Puckett PA-C  Date: 7/10/2018  Time: 12:37 PM

## 2018-07-10 NOTE — PLAN OF CARE
Problem: Patient Care Overview  Goal: Plan of Care/Patient Progress Review  Discharge Planner PT   Patient plan for discharge: Home    Current status: Eval completed. Pt reports moderate pain ; required CGA w/ mobility; able to amb 60 feet x1 with RW  CGA.    Barriers to return to prior living situation: none; will need to amb. Steps to the main floor of his home    Recommendations for discharge: home w/ assistance from  his spouse as needed; HEP for further strengthening     Rationale for recommendations: Pt indep. at baseline; POD  1 status        Entered by: Cielo Mallory 07/10/2018 11:44 AM

## 2018-07-10 NOTE — PLAN OF CARE
Problem: Patient Care Overview  Goal: Plan of Care/Patient Progress Review  Outcome: Improving  Patient A/O x4. CMS intact; full feeling by 2130 on 7/09. Stand by bedside to use urinal; uses walker and SBA. C/O pain to hip area; gave prn and scheduled pain meds-pain well controlled once IV access attained, gave morphine. Sleeping comfortably this shift; did not need any pain meds since 0100. Great use of [employer] Spirometer. Home CPAP on . Tolerating PO well; will d/c iv fluids.  VSS    Continue to monitor and implement poc.

## 2018-07-10 NOTE — PROGRESS NOTES
[facility] Medicine Progress Note  Date of Service: 07/10/2018    Assessment & Plan   Ludin Lance is a 63 year old male who underwent right total hip arthroplasty on 7/9/2018, Dr. Bartlett.    Active Problems:    Hip arthrosis - POD #1 after right total hip arthroplasty.  Having more pain than he thought he would, but oxycodone and acetaminophen are effective so far.  - Analgesia, DVT prophylaxis, therapy per Orthopedics service.      Obstructive sleep apnea - severe per home sleep test 2014, AHI 74/hour.  Is home home CPAP, settings unknown.  Has been subjectively effective.  - Continue nightly PAP use, patient's home equipment is with him here in-house.      No significant past medical history otherwise - only pre-admission medication is ibuprofen PRN.      DVT Prophylaxis: Enoxaparin (Lovenox) SQ  Code Status: Full Code    Lines: Peripheral line.   Phillips catheter: Not needed.  Restraints: Not needed.    Discussion: Medically stable POD #1.    Disposition: Anticipate discharge in 1 to 2 days per Orthopedics note.     Attestation:  I have reviewed today's vital signs, notes, medications, labs and imaging.  Amount of time performed on this follow-up visit: 20 minutes.    Kaleb Castaneda MD      Interval History   Denies dyspnea, chest pain, nausea, emesis.  Home PAP therapy worked well last night; he slept well.    Physical Exam   Temp:  [97.8  F (36.6  C)-98.8  F (37.1  C)] 98.8  F (37.1  C)  Pulse:  [77-83] 83  Heart Rate:  [55-72] 72  Resp:  [14-18] 16  BP: (102-124)/(57-87) 116/61  SpO2:  [94 %-98 %] 97 %    Weights:   Vitals:    07/09/18 1050   Weight: 138.3 kg (305 lb)    Body mass index is 43.76 kg/(m^2).    GENERAL: Pleasant man, looks comfortable.  EYES: Eyes grossly normal to inspection, extraocular movements intact  HENT: Nares patent bilaterally.  Nasal mucosa normal, no discharge.    NECK: Trachea midline, no stridor.    RESP: No accessory muscle use.   Symmetrical breath sounds.  Lungs clear throughout on inspiration and expiration.  Expiration not prolonged, no wheeze.  CV: Regular rate and rhythm, non-tachycardic.  Normal S1 S2, no murmur or extra sound.  No lower extremity edema.  ABDOMEN: Protuberant, soft, non-tender, no guarding.  Liver and spleen not enlarged, no masses palpable.  Bowel sounds positive.  NEURO: Alert, oriented, conversant.  Cranial nerves II - XII grossly intact.  No gross motor or sensory deficits.    PSYCH: Calm, alert, conversant.  Able to articulate logical thoughts, no tangential thoughts, no hallucinations or delusions.  Affect normal.        Data     Recent Labs  Lab 07/10/18  0542   HGB 11.1*   *   CR 1.00       No results for input(s): GLC, BGM in the last 168 hours.     Unresulted Labs Ordered in the Past 30 Days of this Admission     No orders found for last 61 day(s).           Imaging  No results found for this or any previous visit (from the past 24 hour(s)).     I reviewed all new labs and imaging results over the last 24 hours. I personally reviewed no images or EKG's today.    Medications     dextrose 5% and 0.45% NaCl + KCl 20 mEq/L Stopped (07/09/18 2000)       acetaminophen  975 mg Oral Q8H     enoxaparin  40 mg Subcutaneous Q24H     gabapentin  300 mg Oral BID     senna-docusate  1 tablet Oral BID    Or     senna-docusate  2 tablet Oral BID     sodium chloride (PF)  3 mL Intracatheter Q8H       Kaleb Castaneda MD

## 2018-07-10 NOTE — PROGRESS NOTES
" 07/10/18 1000   Quick Adds   Type of Visit Initial Occupational Therapy Evaluation   Living Environment   Lives With spouse   Living Arrangements house   Home Accessibility stairs to enter home;stairs within home   Number of Stairs to Enter Home 2   Number of Stairs Within Home 6   Stair Railings at Home inside, present on right side   Living Environment Comment walk-in shower with suction cup grab bar   Self-Care   Equipment Currently Used at Home raised toilet  (reacher)   Functional Level Prior   Ambulation 0-->independent   Transferring 0-->independent   Toileting 1-->assistive equipment  (RTS)   Bathing 0-->independent   Dressing 0-->independent   Eating 0-->independent   Communication 0-->understands/communicates without difficulty   Swallowing 0-->swallows foods/liquids without difficulty   Cognition 0 - no cognition issues reported   Fall history within last six months no   Which of the above functional risks had a recent onset or change? none   General Information   Onset of Illness/Injury or Date of Surgery - Date 07/09/18   Referring Physician Ky Bartlett MD   Patient/Family Goals Statement To return home with support of spouse   Additional Occupational Profile Info/Pertinent History of Current Problem s/p R THANH   Precautions/Limitations right hip precautions   Weight-Bearing Status - RLE weight-bearing as tolerated   Cognitive Status Examination   Orientation orientation to person, place and time   Pain Assessment   Patient Currently in Pain Yes, see Vital Sign flowsheet  (\"8/10\")   Transfer Skill: Sit to Stand   Level of [place]: Sit/Stand stand-by assist   Physical Assist/Nonphysical Assist: Sit/Stand 1 person assist   Transfer Skill: Sit to Stand weight-bearing as tolerated   Assistive Device for Transfer: Sit/Stand rolling walker   Transfer Skill: Toilet Transfer   Level of [place]: Toilet stand-by assist   Physical Assist/Nonphysical Assist: Toilet 1 person assist " "  Weight-Bearing Restrictions: Toilet weight-bearing as tolerated   Assistive Device rolling walker;grab bars   Toilet Transfer Skill Comments Pt has UE supports near toilet at home with RTS   Upper Body Dressing   Level of [place]: Dress Upper Body independent   Lower Body Dressing   Level of [place]: Dress Lower Body independent  (for pants only. Pt/wife decline need for sock aid)   Physical Assist/Nonphysical Assist: Dress Lower Body supervision   Assistive Device reacher   Instrumental Activities of Daily Living (IADL)   IADL Comments Pt states wife can assist with IADLs as needed.    Activities of Daily Living Analysis   Impairments Contributing to Impaired Activities of Daily Living pain;post surgical precautions   General Therapy Interventions   Planned Therapy Interventions ADL retraining   Clinical Impression   Criteria for Skilled Therapeutic Interventions Met yes, treatment indicated   OT Diagnosis decreased independence with ADLs and functional mobility   Influenced by the following impairments hip precautions, pain   Assessment of Occupational Performance 3-5 Performance Deficits   Identified Performance Deficits LB dressing, toilet transfer, shower transfer, car trasnfer.    Clinical Decision Making (Complexity) Low complexity   Therapy Frequency daily   Predicted Duration of Therapy Intervention (days/wks) 1x treat   Anticipated Discharge Disposition Home with Assist   Risks and Benefits of Treatment have been explained. Yes   Patient, Family & other staff in agreement with plan of care Yes   [facility] AM-PAC  \"6 Clicks\" Daily Activity Inpatient Short Form   1. Putting on and taking off regular lower body clothing? 3 - A Little   2. Bathing (including washing, rinsing, drying)? 3 - A Little   3. Toileting, which includes using toilet, bedpan or urinal? 3 - A Little   4. Putting on and taking off regular upper body clothing? 4 - None   5. Taking care of personal grooming such as " brushing teeth? 4 - None   6. Eating meals? 4 - None   Daily Activity Raw Score (Score out of 24.Lower scores equate to lower levels of function) 21   Total Evaluation Time   Total Evaluation Time (Minutes) 8

## 2018-07-10 NOTE — PROGRESS NOTES
07/10/18 0800   Quick Adds   Type of Visit Initial PT Evaluation   Living Environment   Lives With spouse   Living Arrangements house   Home Accessibility stairs to enter home;stairs within home   Number of Stairs to Enter Home 2   Number of Stairs Within Home 6   Stair Railings at Home inside, present on right side   Functional Level Prior   Ambulation 0-->independent   Transferring 0-->independent   Toileting 0-->independent   Bathing 0-->independent   Dressing 0-->independent   Eating 0-->independent   Communication 0-->understands/communicates without difficulty   Swallowing 0-->swallows foods/liquids without difficulty   Cognition 0 - no cognition issues reported   Fall history within last six months no   Which of the above functional risks had a recent onset or change? none   Prior Functional Level Comment PLOF- Pt indep/ with ambulation with no device,pain. Pt reports ambulation  limited to 2 blocks    General Information   Onset of Illness/Injury or Date of Surgery - Date 07/09/18   Referring Physician Dhruv   Patient/Family Goals Statement Pt w/ goal of DC to home   Pertinent History of Current Problem (include personal factors and/or comorbidities that impact the POC) Primary osteoarthrosis, right hip.; s/p right total hip arthroplasty.   Hx of bilateral TKA    Precautions/Limitations right hip precautions   Weight-Bearing Status - RLE weight-bearing as tolerated   General Observations Pt reportd moderate pain- 7/10   Pain Assessment   Patient Currently in Pain Yes, see Vital Sign flowsheet   Strength   Strength Comments Pt  unable to SLR on R    MMT: Hip, Rehab Eval   Hip Flexion - Left Side (3-/5) fair minus, left   Hip ABduction - Left Side (2+/5) poor plus, left   Bed Mobility   Bed Mobility Comments Min. assistance sitting> supine w/ use of belt  as leg    Transfer Skills   Transfer Comments CGA sit<> stand w/ RW. Verbally reviewed / instructed pt o nTHA Precautions w/ mobility,. discused  "transfer technique, sitting surfaces   Gait   Gait Gait Skill   Gait Comments Pt instructed on WBAT, gait sequence. AMb 60 feet x1 with RW, CGA. steady gait. , heavily reliant on Walker support   Gait Skills   Level of [place]: Gait contact guard   Physical Assist/Nonphysical Assist: Gait verbal cues  w/ turning)   Weight-Bearing Restrictions: Gait weight-bearing as tolerated   Gait Analysis   Gait Pattern Used swing-to gait   Gait Deviations Noted decreased kim;other (see comments)  (decreased stance time on R)   Impairments Contributing to Gait Deviations pain;decreased strength   General Therapy Interventions   Planned Therapy Interventions bed mobility training;gait training;ROM   Clinical Impression   Criteria for Skilled Therapeutic Intervention yes, treatment indicated   PT Diagnosis right total hip arthroplasty.    Influenced by the following impairments Decreased strength Right LE,pain. post surgical precautions   Functional limitations due to impairments altered mobility   Clinical Presentation Stable/Uncomplicated   Clinical Presentation Rationale clinical judgement   Clinical Decision Making (Complexity) Low complexity   Therapy Frequency` 2 times/day   Predicted Duration of Therapy Intervention (days/wks) 1 day   Anticipated Equipment Needs at Discharge (Pt has a RW, SEC, RTS )   Anticipated Discharge Disposition Home with Assist   Risk & Benefits of therapy have been explained Yes   Patient, Family & other staff in agreement with plan of care Yes   [facility] AM-PAC  \"6 Clicks\" V.2 Basic Mobility Inpatient Short Form   1. Turning from your back to your side while in a flat bed without using bedrails? 3 - A Little   2. Moving from lying on your back to sitting on the side of a flat bed without using bedrails? 3 - A Little   3. Moving to and from a bed to a chair (including a wheelchair)? 3 - A Little   4. Standing up from a chair using your arms (e.g., wheelchair, or bedside chair)? 3 - " A Little   5. To walk in hospital room? 3 - A Little   6. Climbing 3-5 steps with a railing? 2 - A Lot   Basic Mobility Raw Score (Score out of 24.Lower scores equate to lower levels of function) 17   Total Evaluation Time   Total Evaluation Time (Minutes) 10

## 2018-07-10 NOTE — PLAN OF CARE
Problem: Patient Care Overview  Goal: Plan of Care/Patient Progress Review  Outcome: No Change  Vital signs stable. Patient tolerating regular diet and denies any nausea. Patient voiding adequate amounts. Using incentive spirometer well obtaining 3000mL. Patient up with assist of 1 with walker.      Problem: Hip Arthroplasty (Total, Partial) (Adult)  Goal: Signs and Symptoms of Listed Potential Problems Will be Absent, Minimized or Managed (Hip Arthroplasty)  Signs and symptoms of listed potential problems will be absent, minimized or managed by discharge/transition of care (reference Hip Arthroplasty (Total, Partial) (Adult) CPG).   Outcome: No Change  POD #1. Dressing was changed to right hip. Moderate bloody drainage noted on old dressing. Ortho saw dressing before it was changed and their were no concerns. Incision is ecchymotic with glued mesh intact. Patient up in the chair for meals and has been on 2 walks thus far. Patient will do another walk this evening. Denies any numbness or tingling. Patient reporting pain rated a 7/10. Scheduled tylenol and PRN oxycodone given. Ice applied intermittently.    Goal: Anesthesia/Sedation Recovery  Outcome: Completed Date Met: 07/10/18  Patient alert and cooperative. No numbness or tingling in extremities.

## 2018-07-11 ENCOUNTER — APPOINTMENT (OUTPATIENT)
Dept: PHYSICAL THERAPY | Facility: CLINIC | Age: 63
DRG: 470 | End: 2018-07-11
Attending: ORTHOPAEDIC SURGERY
Payer: COMMERCIAL

## 2018-07-11 PROBLEM — K59.01 SLOW TRANSIT CONSTIPATION: Status: ACTIVE | Noted: 2018-07-11

## 2018-07-11 LAB — HGB BLD-MCNC: 10.6 G/DL (ref 13.3–17.7)

## 2018-07-11 PROCEDURE — 36415 COLL VENOUS BLD VENIPUNCTURE: CPT | Performed by: ORTHOPAEDIC SURGERY

## 2018-07-11 PROCEDURE — 25000132 ZZH RX MED GY IP 250 OP 250 PS 637: Performed by: PHYSICIAN ASSISTANT

## 2018-07-11 PROCEDURE — 40000193 ZZH STATISTIC PT WARD VISIT: Performed by: PHYSICAL THERAPIST

## 2018-07-11 PROCEDURE — 97116 GAIT TRAINING THERAPY: CPT | Mod: GP | Performed by: PHYSICAL THERAPIST

## 2018-07-11 PROCEDURE — 12000000 ZZH R&B MED SURG/OB

## 2018-07-11 PROCEDURE — 97110 THERAPEUTIC EXERCISES: CPT | Mod: GP | Performed by: PHYSICAL THERAPIST

## 2018-07-11 PROCEDURE — 85018 HEMOGLOBIN: CPT | Performed by: ORTHOPAEDIC SURGERY

## 2018-07-11 PROCEDURE — 25000132 ZZH RX MED GY IP 250 OP 250 PS 637: Performed by: ORTHOPAEDIC SURGERY

## 2018-07-11 PROCEDURE — 25000128 H RX IP 250 OP 636: Performed by: ORTHOPAEDIC SURGERY

## 2018-07-11 PROCEDURE — 99231 SBSQ HOSP IP/OBS SF/LOW 25: CPT

## 2018-07-11 RX ORDER — POLYETHYLENE GLYCOL 3350 17 G/17G
17 POWDER, FOR SOLUTION ORAL DAILY PRN
Status: DISCONTINUED | OUTPATIENT
Start: 2018-07-11 | End: 2018-07-12 | Stop reason: HOSPADM

## 2018-07-11 RX ORDER — BISACODYL 10 MG
10 SUPPOSITORY, RECTAL RECTAL DAILY PRN
Status: DISCONTINUED | OUTPATIENT
Start: 2018-07-11 | End: 2018-07-12 | Stop reason: HOSPADM

## 2018-07-11 RX ADMIN — OXYCODONE HYDROCHLORIDE 10 MG: 5 TABLET ORAL at 13:28

## 2018-07-11 RX ADMIN — SENNOSIDES AND DOCUSATE SODIUM 2 TABLET: 8.6; 5 TABLET ORAL at 08:43

## 2018-07-11 RX ADMIN — ENOXAPARIN SODIUM 40 MG: 40 INJECTION SUBCUTANEOUS at 12:13

## 2018-07-11 RX ADMIN — GABAPENTIN 300 MG: 300 CAPSULE ORAL at 08:44

## 2018-07-11 RX ADMIN — OXYCODONE HYDROCHLORIDE 10 MG: 5 TABLET ORAL at 10:22

## 2018-07-11 RX ADMIN — OXYCODONE HYDROCHLORIDE 10 MG: 5 TABLET ORAL at 17:09

## 2018-07-11 RX ADMIN — ACETAMINOPHEN 975 MG: 325 TABLET, FILM COATED ORAL at 15:31

## 2018-07-11 RX ADMIN — SENNOSIDES AND DOCUSATE SODIUM 1 TABLET: 8.6; 5 TABLET ORAL at 20:28

## 2018-07-11 RX ADMIN — ACETAMINOPHEN 975 MG: 325 TABLET, FILM COATED ORAL at 07:01

## 2018-07-11 RX ADMIN — OXYCODONE HYDROCHLORIDE 10 MG: 5 TABLET ORAL at 06:58

## 2018-07-11 RX ADMIN — OXYCODONE HYDROCHLORIDE 10 MG: 5 TABLET ORAL at 23:07

## 2018-07-11 RX ADMIN — GABAPENTIN 300 MG: 300 CAPSULE ORAL at 20:28

## 2018-07-11 RX ADMIN — OXYCODONE HYDROCHLORIDE 10 MG: 5 TABLET ORAL at 20:28

## 2018-07-11 RX ADMIN — ACETAMINOPHEN 975 MG: 325 TABLET, FILM COATED ORAL at 23:07

## 2018-07-11 RX ADMIN — BISACODYL 10 MG: 10 SUPPOSITORY RECTAL at 17:02

## 2018-07-11 NOTE — PLAN OF CARE
Problem: Patient Care Overview  Goal: Plan of Care/Patient Progress Review  Discharge Planner PT   Patient plan for discharge: Home    Current status: Pt reports pain high last evening, but now controlled.  Indep. Sit<> stand w/ RW, does have difficulty w/ standing>sitting due to tightness./ pain.     AMb.   140 feet x1 with PUW, SBA, practiced stair amb with railing, SEC and also w/o support as pt/ wife believes steps to enter have no support at times( had pt use therapist for support). Also showed process for navigating steps w/ walker if step width sufficient.      Barriers to return to prior living situation: none; anticipate one more PT session to practice stair amb    Recommendations for discharge: Home w/ assistance  As needed; HEP for further strengthening     Rationale for recommendations:  See above        Entered by: Cielo Mallory 07/11/2018 2:52 PM

## 2018-07-11 NOTE — PROGRESS NOTES
10mg Oxycodone given @2200 which was ineffective pain relief, 2mg IV Morphine given @2315 which did provide pain relief. Pt resting in a position of comfort. Refused hip abductor, prefers pillow between legs instead. Wearing CPAP. Ice applied to (R) hip/surgical site. All needs met at this time.

## 2018-07-11 NOTE — PROGRESS NOTES
"[facility] Medicine Progress Note  Date of Service: 07/11/2018    Assessment & Plan   Ludin Lance is a 63 year old male who underwent right total hip arthroplasty on 7/9/2018, Dr. Bartlett.    Active Problems:    Hip arthrosis - POD #2 after right total hip arthroplasty.  Hip pain is well-controlled, but starting to have \"back spasms\" which he relates to constipation; see below.  - Analgesia, DVT prophylaxis, therapy per Orthopedics service.      Obstructive sleep apnea - severe per home sleep test 2014, AHI 74/hour.  Is home home CPAP, settings unknown.  Has been subjectively effective.  - Continue nightly PAP use, patient's home equipment is with him here in-house.      Slow-transit constipation - exacerbated by post-op opioids, which are now D/C'd.  Abdominal exam is unremarkable; he has had similar symptoms following previous operations.  Dulcolax supp is ordered, as that has been effective in past.  I'll also leave an order for polyethylene glycol for PRN use in case suppository is ineffective.      No significant past medical history otherwise - only pre-admission medication is ibuprofen PRN.      DVT Prophylaxis: Enoxaparin (Lovenox) SQ  Code Status: Full Code    Lines: Peripheral line.   Phillips catheter: Not needed.  Restraints: Not needed.    Discussion: Aside from constipation, which is being addressed, is medically stable POD #2.    Disposition: Anticipate discharge tomorrow per Orthopedics note.     Attestation:  I have reviewed today's vital signs, notes, medications, labs and imaging.  Amount of time performed on this follow-up visit: 15 minutes.    Kaleb Castaneda MD      Interval History   Denies dyspnea, chest pain, nausea, emesis.  See information regarding constipation above.  Home PAP therapy worked well last night; he slept well.    Physical Exam   Temp:  [98  F (36.7  C)-99.5  F (37.5  C)] 99.2  F (37.3  C)  Pulse:  [66-70] 66  Heart Rate:  [88] " 88  Resp:  [18] 18  BP: (112-131)/(55-81) 131/81  SpO2:  [96 %-98 %] 97 %    Weights:   Vitals:    07/09/18 1050   Weight: 138.3 kg (305 lb)    Body mass index is 43.76 kg/(m^2).    GENERAL: Pleasant man, looks comfortable.  EYES: Eyes grossly normal to inspection, extraocular movements intact  HENT: Nares patent bilaterally.  Nasal mucosa normal, no discharge.    NECK: Trachea midline, no stridor.    RESP: No accessory muscle use.  Symmetrical breath sounds.  Lungs clear throughout on inspiration and expiration.  Expiration not prolonged, no wheeze.  CV: Regular rate and rhythm, non-tachycardic.  Normal S1 S2, no murmur or extra sound.  No lower extremity edema.  ABDOMEN: Protuberant, soft, non-tender, no guarding.  Liver and spleen not enlarged, no masses palpable.  Bowel sounds positive.  NEURO: Alert, oriented, conversant.  Cranial nerves II - XII grossly intact.  No gross motor or sensory deficits.    PSYCH: Calm, alert, conversant.  Able to articulate logical thoughts, no tangential thoughts, no hallucinations or delusions.  Affect normal.        Data     Recent Labs  Lab 07/11/18  0632 07/10/18  0542   HGB 10.6* 11.1*   PLT  --  119*   CR  --  1.00       No results for input(s): GLC, BGM in the last 168 hours.     Unresulted Labs Ordered in the Past 30 Days of this Admission     No orders found from 5/10/2018 to 7/10/2018.           Imaging  No results found for this or any previous visit (from the past 24 hour(s)).     I reviewed all new labs and imaging results over the last 24 hours. I personally reviewed no images or EKG's today.    Medications     dextrose 5% and 0.45% NaCl + KCl 20 mEq/L Stopped (07/09/18 2000)       acetaminophen  975 mg Oral Q8H     enoxaparin  40 mg Subcutaneous Q24H     gabapentin  300 mg Oral BID     senna-docusate  1 tablet Oral BID    Or     senna-docusate  2 tablet Oral BID     sodium chloride (PF)  3 mL Intracatheter Q8H       Kaleb Castaneda MD

## 2018-07-11 NOTE — PLAN OF CARE
Problem: Chronic Respiratory Difficulty Comorbidity  Goal: Chronic Respiratory Difficulty  Patient comorbidity will be monitored for signs and symptoms of Respiratory Difficulty (Chronic) condition.  Problems will be absent, minimized or managed by discharge/transition of care.  Outcome: No Change  Patient uses home CPAP at bedtime per patient report.

## 2018-07-11 NOTE — PLAN OF CARE
"Problem: Hip Arthroplasty (Total, Partial) (Adult)  Goal: Signs and Symptoms of Listed Potential Problems Will be Absent, Minimized or Managed (Hip Arthroplasty)  Signs and symptoms of listed potential problems will be absent, minimized or managed by discharge/transition of care (reference Hip Arthroplasty (Total, Partial) (Adult) CPG).   Outcome: Improving  Patients pain is \"5\" on 0-10 pain scale after taking oxycodone and scheduled tylenol.  Declined offer for hydroxyzine.    Patient is voiding and passing gas okay.  Given dulcolax suppository for constipation.  Has drank prune juice today as well.      "

## 2018-07-11 NOTE — PROGRESS NOTES
"[facility] Orthopaedics Progress Note      Post-operative Day: 2 Days Post-Op    Procedure(s):  Right Total Hip Arthroplasty - Wound Class: I-Clean      Subjective:    Pain: moderate aching to R hip. Denies shooting pain, parasthesias, numbness and weakness.   denies Chest pain, SOB, O2 required: None  denies nausea/ emesis  denies lightheadedness, dizziness and weakness  BM -, passing gas +. Urinating well, Phillips in place: no.       Objective:  Blood pressure 112/55, pulse 66, temperature 99.5  F (37.5  C), temperature source Oral, resp. rate 18, height 1.778 m (5' 10\"), weight 138.3 kg (305 lb), SpO2 96 %.    Patient Vitals for the past 24 hrs:   BP Temp Temp src Pulse Heart Rate Resp SpO2   07/11/18 0808 112/55 99.5  F (37.5  C) Oral 66 - 18 96 %   07/10/18 2330 122/61 98.4  F (36.9  C) Oral 70 - 18 98 %   07/10/18 1502 116/61 98.8  F (37.1  C) Oral - 72 16 97 %       Wt Readings from Last 4 Encounters:   07/09/18 138.3 kg (305 lb)   01/15/15 (!) 136.5 kg (301 lb)   12/02/14 135.2 kg (298 lb)   11/24/14 135.2 kg (298 lb)     General: Alert and orientated. No apparent distress. Non-labored breathing. Appropriate affect.   MSK: R hip: dressing Clean, dry, and intact. Skin intact, no ecchymosis, no erythema. nontender to palpation to incision site. ROM appropriate for post op. Distal neurovascularly intact. Compartments soft and non-tender. No calf pain. Homans negative. PF/DF and EHL intact.       Pertinent Labs   Lab Results: personally reviewed.     Recent Labs   Lab Test  07/11/18   0632  07/10/18   0542   HGB  10.6*  11.1*   PLT   --   119*         Procedure(s):  Right Total Hip Arthroplasty - Wound Class: I-Clean  Plan: Anticoagulation protocol: Lovenox inpatient and then  mg daily at discharge  x 42  days            Pain medications:  oxycodone and tylenol            Weight bearing status:  WBAT            Dressing Change:  dry dressing change with gauze. Freddy to remain intact until follow up..      "       Disposition:  Home tomorrow             Follow up: 2 week with TANNA            Continue cares and rehabilitation     Report completed by:  Coni Puckett PA-C  Date: 7/11/2018  Time: 1:13 PM

## 2018-07-12 VITALS
SYSTOLIC BLOOD PRESSURE: 106 MMHG | OXYGEN SATURATION: 94 % | TEMPERATURE: 99.5 F | DIASTOLIC BLOOD PRESSURE: 65 MMHG | HEIGHT: 70 IN | RESPIRATION RATE: 20 BRPM | BODY MASS INDEX: 43.67 KG/M2 | WEIGHT: 305 LBS | HEART RATE: 78 BPM

## 2018-07-12 PROBLEM — H10.9 BACTERIAL CONJUNCTIVITIS OF LEFT EYE: Status: ACTIVE | Noted: 2018-07-12

## 2018-07-12 LAB — PLATELET # BLD AUTO: 104 10E9/L (ref 150–450)

## 2018-07-12 PROCEDURE — 25000132 ZZH RX MED GY IP 250 OP 250 PS 637: Performed by: ORTHOPAEDIC SURGERY

## 2018-07-12 PROCEDURE — 25000128 H RX IP 250 OP 636: Performed by: ORTHOPAEDIC SURGERY

## 2018-07-12 PROCEDURE — 97116 GAIT TRAINING THERAPY: CPT | Mod: GP | Performed by: REHABILITATION PRACTITIONER

## 2018-07-12 PROCEDURE — 99231 SBSQ HOSP IP/OBS SF/LOW 25: CPT

## 2018-07-12 PROCEDURE — 85049 AUTOMATED PLATELET COUNT: CPT | Performed by: ORTHOPAEDIC SURGERY

## 2018-07-12 PROCEDURE — 36415 COLL VENOUS BLD VENIPUNCTURE: CPT | Performed by: ORTHOPAEDIC SURGERY

## 2018-07-12 PROCEDURE — 40000193 ZZH STATISTIC PT WARD VISIT: Performed by: REHABILITATION PRACTITIONER

## 2018-07-12 RX ORDER — OXYCODONE HYDROCHLORIDE 5 MG/1
5-10 TABLET ORAL
Qty: 40 TABLET | Refills: 0 | Status: SHIPPED | OUTPATIENT
Start: 2018-07-12 | End: 2019-04-02

## 2018-07-12 RX ORDER — SULFACETAMIDE SODIUM 100 MG/ML
1 SOLUTION/ DROPS OPHTHALMIC
Qty: 2 ML | Refills: 0 | Status: SHIPPED | OUTPATIENT
Start: 2018-07-12 | End: 2018-07-17

## 2018-07-12 RX ORDER — AMOXICILLIN 250 MG
2 CAPSULE ORAL DAILY PRN
Qty: 100 TABLET | Refills: 0 | Status: SHIPPED | OUTPATIENT
Start: 2018-07-12 | End: 2019-04-02

## 2018-07-12 RX ORDER — HYDROXYZINE HYDROCHLORIDE 25 MG/1
25 TABLET, FILM COATED ORAL EVERY 6 HOURS PRN
Qty: 20 TABLET | Refills: 0 | Status: SHIPPED | OUTPATIENT
Start: 2018-07-12 | End: 2019-04-02

## 2018-07-12 RX ORDER — ACETAMINOPHEN 325 MG/1
650 TABLET ORAL EVERY 4 HOURS PRN
Qty: 100 TABLET | Refills: 0 | Status: SHIPPED | OUTPATIENT
Start: 2018-07-12

## 2018-07-12 RX ADMIN — ENOXAPARIN SODIUM 40 MG: 40 INJECTION SUBCUTANEOUS at 11:49

## 2018-07-12 RX ADMIN — OXYCODONE HYDROCHLORIDE 10 MG: 5 TABLET ORAL at 03:45

## 2018-07-12 RX ADMIN — GABAPENTIN 300 MG: 300 CAPSULE ORAL at 08:31

## 2018-07-12 RX ADMIN — OXYCODONE HYDROCHLORIDE 10 MG: 5 TABLET ORAL at 06:59

## 2018-07-12 RX ADMIN — SENNOSIDES AND DOCUSATE SODIUM 2 TABLET: 8.6; 5 TABLET ORAL at 08:31

## 2018-07-12 RX ADMIN — ACETAMINOPHEN 975 MG: 325 TABLET, FILM COATED ORAL at 06:59

## 2018-07-12 RX ADMIN — OXYCODONE HYDROCHLORIDE 10 MG: 5 TABLET ORAL at 11:47

## 2018-07-12 NOTE — DISCHARGE SUMMARY
[facility] Orthopedics Discharge Summary                                  [facility]     RUTH LOPEZ [id number]   Age: 63 year old  PCP: Castro Lomas, [phone] 1955     Date of Admission:  7/9/2018  Date of Discharge::  7/12/2018  Discharge Physician:  Coni Puckett    Code status:  Full Code    Admission Information:  Admission Diagnosis:  right hip ostearthritis  Hip arthrosis    Post-Operative Day: 3 Days Post-Op     Reason for admission:  The patient was admitted for the following:Procedure(s) (LRB):  ARTHROPLASTY HIP (Right)    Active Problems:    Hip arthrosis    Slow transit constipation      Allergies:  Nkda [no known drug allergies]    Following the procedure noted above the patient was transferred to the post-op floor and started on:    Therapy:  physical therapy and occupational therapy  Anticoagulation Plan: Lovenox inpatient and then  mg daily at discharge  for 42 days  Pain Management: oxycodone and tylenol  Weight bearing status: Weight bearing as tolerated     The patient was followed and co-managed by the hospitalist service during the inpatient treatment course  Complications:  None  Consultations:  None     Pertinent Labs   Lab Results: personally reviewed.     Recent Labs   Lab Test  07/12/18   0653  07/11/18   0632  07/10/18   0542   HGB   --   10.6*  11.1*   PLT  104*   --   119*          Discharge Information:  Condition at discharge: Stable  Discharge destination:  Discharged to home     Medications at discharge:  Current Discharge Medication List      START taking these medications    Details   acetaminophen (TYLENOL) 325 MG tablet Take 2 tablets (650 mg) by mouth every 4 hours as needed for mild pain  Qty: 100 tablet, Refills: 0    Associated Diagnoses: Status post total hip replacement, right      aspirin 325 MG EC tablet Take 1 tablet (325 mg) by mouth daily  Qty: 42 tablet, Refills: 0    Associated Diagnoses: Status post total hip  replacement, right      hydrOXYzine (ATARAX) 25 MG tablet Take 1 tablet (25 mg) by mouth every 6 hours as needed for itching or other (pain)  Qty: 20 tablet, Refills: 0    Associated Diagnoses: Status post total hip replacement, right      !! order for DME Equipment being ordered: Walker Wheels () and Walker ()  Treatment Diagnosis: s/p THANH  Qty: 1 Units, Refills: 0    Associated Diagnoses: Status post total hip replacement, right      oxyCODONE IR (ROXICODONE) 5 MG tablet Take 1-2 tablets (5-10 mg) by mouth every 3 hours as needed for moderate to severe pain  Qty: 40 tablet, Refills: 0    Associated Diagnoses: Status post total hip replacement, right      senna-docusate (SENOKOT-S;PERICOLACE) 8.6-50 MG per tablet Take 2 tablets by mouth daily as needed for constipation  Qty: 100 tablet, Refills: 0    Associated Diagnoses: Status post total hip replacement, right       !! - Potential duplicate medications found. Please discuss with provider.      CONTINUE these medications which have NOT CHANGED    Details   !! ORDER FOR DME Equipment being ordered: Respironics Auto CPAP pressure 7-12 .       !! - Potential duplicate medications found. Please discuss with provider.                     Follow-Up Care:  Patient should be seen in the office in 10-14 days by the Orthopedic Surgeon/Physician Assistant.  Call [phone] for appointment or questions.    Coni Puckett

## 2018-07-12 NOTE — PLAN OF CARE
Problem: Patient Care Overview  Goal: Plan of Care/Patient Progress Review  Physical Therapy Discharge Summary    Reason for therapy discharge:    Discharged to home.    Progress towards therapy goal(s). See goals on Care Plan in [facility] electronic health record for goal details.  Goals met         Therapy recommendation(s):    Continue home exercise program.; outpatient PT for ongoing strengthening

## 2018-07-12 NOTE — PLAN OF CARE
Problem: Patient Care Overview  Goal: Plan of Care/Patient Progress Review  Outcome: Completed Date Met: 07/12/18  ARIADNA RUSH DISCHARGE NOTE    Patient discharged to home at 3:03 PM via wheel chair. Accompanied by spouse and staff. Discharge instructions reviewed with patient and spouse, opportunity offered to ask questions. Prescriptions sent to patients preferred pharmacy. All belongings sent with patient.    Sarah Grant

## 2018-07-12 NOTE — PLAN OF CARE
Problem: Patient Care Overview  Goal: Plan of Care/Patient Progress Review  Outcome: Adequate for Discharge Date Met: 07/12/18  Plan for pt to d/c home today, pt ambulated 300' with PUW, x20 steps R hand railing and SEC which pt states has at home also per pt request practiced R hand railing and HHA with L side.

## 2018-07-12 NOTE — DISCHARGE INSTRUCTIONS
Orthopedic Surgery Discharge Instructions    1. Follow up:  Follow up with Gamaliel Lopez PA-C.  in 2 weeks for post op check and x rays as scheduled.  Call [phone] if appointment needed or questions. If you have questions or concerns while at home, please call [facility]. If it is an emergency, call [phone]. You may find the answers to questions in the included discharge packet from the hospital or your pre operative packet you received in clinic prior to surgery.    2. Pain Medication:  Use pain medication as directed. If you are prescribed narcotic pain medications (Oxycodone, Norco, Percocet, Tylenol #3, Dilaudid, or Tramadol) then you should try to wean off of them as tolerated. These are an AS NEEDED medication, so if you are not having significant pain you should try to take fewer pills at a time or spread out the doses out over a longer period of time than is written on the prescription. You should not drive a car or operate machinery if you are taking prescribed narcotic pain medication. You may not receive a refill on this medication by your surgical team until your follow up appointment, so try to wean off your narcotics as soon as possible. If you need a refill on this medication you may call your surgical team to discuss during business hours. Refills are not given on the weekend under any circumstances, so plan accordingly.    3. How to wean narcotics: Within 2-3 days of surgery you should be able to begin weaning your pain medications. If upon leaving the hospital you are taking 2 tabs every 3-4 hours, you should decrease this to 1 tab every 3-4 hours. Around 4-5 days after surgery you should begin decreasing the frequency of your pain medication to every 4-5 hours. You may also cut your pills in half to decrease the dose as you begin the weaning process. Create a weaning schedule of your pain medication when you get home from the hospital, you may be expected to make the prescription  last until your next appointment. Call your surgical team during business hours to discuss your pain medication if you have questions or concerns about the weaning process.    4. Tylenol and pain medications: If your pain pill contains Tylenol (i.e. Percocet, Norco, Tylenol #3) and you are also taking additional Tylenol/acetaminophen as a pain medication, ensure that you are not taking too much tylenol. Prescription narcotic medications that contain Tylenol usually have 325mg of Tylenol per pill and over-the-counter medications have variable doses of Tylenol. You should not exceed 4,000mg of Tylenol in a 24-hour period. Tylenol should be used in combination with your pain medication, if able, to help reduce the amount of pain medication you are taking.    5. NSAIDs (anti inflammatory medications): You may take NSAIDs to help control your pain at home.  NSAIDs are Ibuprofen, Motrin, Advil, Aleve, Naprosyn etc. You should use over the counter medications such as NSAIDs and Tylenol to wean off narcotics. If you are also taking Aspirin for blood clot prevention, you should use caution with NSAIDs as this may cause issues such as GI bleeding, upset stomach, and dark stools. Recommended doses of NSAIDs after surgery are 1-2 tabs every 6-8 hours, not to exceed 600 mg per dose. It is best to rotate Tylenol and NSAIDs if needed every 4 hours. Use these medications in between your narcotics to help reduce the amount of pain medication needed.      6. How to take over the counter medications with pain medications:  Sample medication schedule:   8 am: Pain medication  10 am: Tylenol 500-650 mg (skip if your pain medication contains tylenol, refer to #4)  12 pm: Pain medication  2 pm: NSAIDs 1-2 tabs, not to exceeded 600 mg  4 pm: Pain medication  6 pm: Tylenol 500-650 mg (skip if your pain medication contains tylenol, refer to #4)  8 pm: Pain medication    7. Applying ice to your incision: ice can provide additional pain relief  at home. Apply  ice in 20 minute intervals. Allow your skin to warm up to room temperature, approximately 40 minutes, before applying another ice pack.    8. Incision instructions:  Keep your incision clean, covered and dry until your post op appointment.  You may shower and get the incision wet if no drainage is present.  You may change your dressing as needed.   Only use dry  guaze over Prineo glue tape.    9. Physical therapy:  Continue physical therapy as soon as possible.  You will need to call a therapy department of your choice to arrange future appointments.  Your order for physical therapy is included in your discharge paperwork.     10. Blood Clot Prevention: Take Aspirin 325 mg  daily  for 42 days for anticoagulation. If you develop abdominal pain or have signs of bleeding - blood in stool or black stools, stop taking the medication and seek medical care. Walk often at home, walking will help reduce the risk of blood clots. If you have been prescribed dariusz stockings or compression stockings, wear them during the day until you follow up with your orthopedic team in clinic. If you were not given Dariusz Stockings in the hospital but would like them, they can be purchased over the counter at your local pharmacy.     11. Call the office if you have any questions/concerns or are experiencing the following:  - increasing drainage from the incision  - foul-smelling or malodorous drainage from the incision  - sudden increase or change in pain that is not controlled by your prescribed medications  - inability to urinate  - new onset of weakness, numbness or severe pain in the extremities  - bowel/bladder incontinence  - Fever greater than 101.5 degrees  - Nausea/vomitting causing inability to eat food or medications    12. Total hip precautions: After your total hip replacement, you have been given hip precautions. Your surgical team will give you clearance when to return to normal activity at your follow up  appointment. Hip precautions include: no bending at the waist greater than 90 degrees or more than what is necessary to sit in a chair or on the toilet. Avoid crossing your legs while sitting or lying in bed. Sleep with a pillow between your legs at night for the first two weeks. No deep squatting or bending, and avoid heavy lifting.

## 2018-07-12 NOTE — PHARMACY - DISCHARGE MEDICATION RECONCILIATION
Discharge medication review for this patient is complete. Pharmacist assisted with medication reconciliation of discharge medications with prior to admission medications.     The following changes were made to the discharge medication list based on pharmacist review:  Added:  none  Discontinued: none  Changed: none      Patient's Discharge Medication List  - medications as listed on After Visit Summary (AVS)     Review of your medicines      START taking       Dose / Directions    acetaminophen 325 MG tablet   Commonly known as:  TYLENOL   Used for:  Status post total hip replacement, right        Dose:  650 mg   Take 2 tablets (650 mg) by mouth every 4 hours as needed for mild pain   Quantity:  100 tablet   Refills:  0       aspirin 325 MG EC tablet   Used for:  Status post total hip replacement, right   Notes to Patient:  Start taking 7/13/18        Dose:  325 mg   Take 1 tablet (325 mg) by mouth daily   Quantity:  42 tablet   Refills:  0       hydrOXYzine 25 MG tablet   Commonly known as:  ATARAX   Used for:  Status post total hip replacement, right        Dose:  25 mg   Take 1 tablet (25 mg) by mouth every 6 hours as needed for itching or other (pain)   Quantity:  20 tablet   Refills:  0       order for DME   Used for:  Status post total hip replacement, right        Equipment being ordered: Walker Wheels () and Walker () Treatment Diagnosis: s/p THANH   Quantity:  1 Units   Refills:  0       oxyCODONE IR 5 MG tablet   Commonly known as:  ROXICODONE   Used for:  Status post total hip replacement, right        Dose:  5-10 mg   Take 1-2 tablets (5-10 mg) by mouth every 3 hours as needed for moderate to severe pain   Quantity:  40 tablet   Refills:  0       senna-docusate 8.6-50 MG per tablet   Commonly known as:  SENOKOT-S;PERICOLACE   Used for:  Status post total hip replacement, right        Dose:  2 tablet   Take 2 tablets by mouth daily as needed for constipation   Quantity:  100 tablet   Refills:  0          CONTINUE these medicines which have NOT CHANGED       Dose / Directions    order for DME        Equipment being ordered: Respironics Auto CPAP pressure 7-12 .   Refills:  0            Where to get your medicines      These medications were sent to [facility] [place], [place] - [address]  [address] [address]     Phone:  [phone]      acetaminophen 325 MG tablet     aspirin 325 MG EC tablet     hydrOXYzine 25 MG tablet     senna-docusate 8.6-50 MG per tablet         Some of these will need a paper prescription and others can be bought over the counter. Ask your nurse if you have questions.     Bring a paper prescription for each of these medications      order for DME     oxyCODONE IR 5 MG tablet

## 2018-07-12 NOTE — PROGRESS NOTES
"[facility] Orthopaedics Progress Note      Post-operative Day: 3 Days Post-Op    Procedure(s):  Right Total Hip Arthroplasty - Wound Class: I-Clean      Subjective:    Pain: moderate  aching to R hip. Denies shooting pain, parasthesias, numbness and weakness.   denies Chest pain, SOB, O2 required: None  denies nausea/ emesis  denies lightheadedness, dizziness and weakness  BM -, passing gas +. Urinating well, Phillips in place: no.       Objective:  Blood pressure 106/65, pulse 78, temperature 99.5  F (37.5  C), temperature source Oral, resp. rate 20, height 1.778 m (5' 10\"), weight 138.3 kg (305 lb), SpO2 94 %.    Patient Vitals for the past 24 hrs:   BP Temp Temp src Pulse Heart Rate Resp SpO2   07/12/18 0752 106/65 99.5  F (37.5  C) Oral 78 - 20 94 %   07/12/18 0040 128/65 99.4  F (37.4  C) Oral - 78 20 95 %   07/11/18 1640 - 99.2  F (37.3  C) - - - - -   07/11/18 1524 131/81 98  F (36.7  C) Oral - 88 18 97 %       Wt Readings from Last 4 Encounters:   07/09/18 138.3 kg (305 lb)   01/15/15 (!) 136.5 kg (301 lb)   12/02/14 135.2 kg (298 lb)   11/24/14 135.2 kg (298 lb)     General: Alert and orientated. No apparent distress. Non-labored breathing. Appropriate affect.   MSK: R hip: dressing Clean, dry, and intact. Skin intact, no ecchymosis, no erythema. nontender to palpation to incision site. ROM appropriate for post op. Distal neurovascularly intact. Compartments soft and non-tender. No calf pain. Homans negative. PF/DF and EHL intact.       Pertinent Labs   Lab Results: personally reviewed.     Recent Labs   Lab Test  07/12/18   0653  07/11/18   0632  07/10/18   0542   HGB   --   10.6*  11.1*   PLT  104*   --   119*         Procedure(s):  Right Total Hip Arthroplasty - Wound Class: I-Clean  Plan: Anticoagulation protocol: Lovenox inpatient and then  mg daily at discharge  x 42  days            Pain medications:  oxycodone and tylenol            Weight bearing status:  WBAT            Dressing Change:  " sterile dry dressing change with gauze. Prineo to remain intact until follow up.             Disposition:  Home today             Follow up: 2 week with TANNA            Continue cares and rehabilitation     Report completed by:  Coni Puckett PA-C  Date: 7/12/2018  Time: 12:02 PM

## 2018-07-12 NOTE — PLAN OF CARE
Problem: Patient Care Overview  Goal: Plan of Care/Patient Progress Review  Outcome: Improving  Patient A/O x4. Requesting pain meds to stay on top of pain. Ice to the area. Movement in room going well. VSS. CMS intact.  Bowels and bladder wnl. D/C to home today.

## 2018-07-12 NOTE — PROGRESS NOTES
"[facility] Medicine Progress Note  Date of Service: 07/12/2018    Assessment & Plan   Ludin Lance is a 63 year old male who underwent right total hip arthroplasty on 7/9/2018, Dr. Bartlett.    Active Problems:    Hip arthrosis - POD #3 after right total hip arthroplasty.  Hip pain is well-controlled, and he is being discharged to home today per Orthopedics.  - Analgesia, DVT prophylaxis, therapy per Orthopedics service.      Obstructive sleep apnea - severe per home sleep test 2014, AHI 74/hour.  Is home home CPAP, settings unknown.  Has been subjectively effective.  - Continue nightly PAP use.      Slow-transit constipation - exacerbated by post-op opioids, which are now D/C'd.  Had BM response overnight to Dulcolax.      Bacterial conjunctivitis OS - new finding today, patient requested exam before he went home today.  Looks mild, but purulence seen in medial canthus.  - Sodium sulamyd 10% gtts, 1 gtt OS QID for 5 days, Rx sent downstairs to outpatient pharmacy.      No significant past medical history otherwise - only pre-admission medication is ibuprofen PRN.      Discussion: Discharged to home by Orthopedics.  I was asked to address the conjunctivitis before he left.    Disposition: Home today.    Attestation:  Amount of time performed on this follow-up visit: 10 minutes.    Kaleb Castaneda MD      Interval History   Noted OS to be red, itchy, with tan \"goop\" in the medial canthus.  Describes OD to be normal.  No other concerns prior to discharge.    Physical Exam   Temp:  [98  F (36.7  C)-99.5  F (37.5  C)] 99.5  F (37.5  C)  Pulse:  [78] 78  Heart Rate:  [78-88] 78  Resp:  [18-20] 20  BP: (106-131)/(65-81) 106/65  SpO2:  [94 %-97 %] 94 %    Weights:   Vitals:    07/09/18 1050   Weight: 138.3 kg (305 lb)    Body mass index is 43.76 kg/(m^2).    GENERAL: Pleasant man, looks comfortable.  EYES: Mild erythema lower lid OS, mild injection of conjunctivae, small " amount tan discahrge in medial canthus.  OD looks normal.  HENT: Nares patent bilaterally.  Nasal mucosa normal, no discharge.            Data     Recent Labs  Lab 07/12/18  0653 07/11/18  0632 07/10/18  0542   HGB  --  10.6* 11.1*   *  --  119*   CR  --   --  1.00       No results for input(s): GLC, BGM in the last 168 hours.     Unresulted Labs Ordered in the Past 30 Days of this Admission     No orders found from 5/10/2018 to 7/10/2018.           Imaging  No results found for this or any previous visit (from the past 24 hour(s)).     I reviewed all new labs and imaging results over the last 24 hours. I personally reviewed no images or EKG's today.    Medications       enoxaparin  40 mg Subcutaneous Q24H     senna-docusate  1 tablet Oral BID    Or     senna-docusate  2 tablet Oral BID     sodium chloride (PF)  3 mL Intracatheter Q8H       Kaleb Castaneda MD

## 2018-07-17 ENCOUNTER — HOSPITAL ENCOUNTER (OUTPATIENT)
Dept: PHYSICAL THERAPY | Facility: CLINIC | Age: 63
Setting detail: THERAPIES SERIES
End: 2018-07-17
Attending: PHYSICIAN ASSISTANT
Payer: COMMERCIAL

## 2018-07-17 PROCEDURE — 97161 PT EVAL LOW COMPLEX 20 MIN: CPT | Mod: GP | Performed by: PHYSICAL THERAPIST

## 2018-07-17 PROCEDURE — 40000718 ZZHC STATISTIC PT DEPARTMENT ORTHO VISIT: Performed by: PHYSICAL THERAPIST

## 2018-07-17 PROCEDURE — 97110 THERAPEUTIC EXERCISES: CPT | Mod: GP | Performed by: PHYSICAL THERAPIST

## 2018-07-17 NOTE — PROGRESS NOTES
"   07/17/18 1200   General Information   Type of Visit Initial OP Ortho PT Evaluation   Start of Care Date 07/17/18   Referring Physician Coni Puckett PA-C   Patient/Family Goals Statement To be able to walk w/ a normal gait and do normal daily activities   Orders Evaluate and Treat   Date of Order 07/12/18   Insurance Type [employer]   Medical Diagnosis R THANH   Body Part(s)   Body Part(s) Hip   Presentation and Etiology   Pertinent history of current problem (include personal factors and/or comorbidities that impact the POC) Pt is s/p R THANH on 7/9/18.  Pain @ best 2/10,  @ worst 6/10.  Meds : tylenol and oxycodone.   PMHX:  B TKA,  Pt states R LE has been shorter for years and he has landed harder on the R side.   Low complexity   Impairments A. Pain;C. Swelling;H. Impaired gait   Onset date of current episode/exacerbation 07/09/18   Pain quality C. Aching   Pain exacerbation comment Walking w/ SEC just started using cane yesterday--previously was using FWW--walking at best 10 min at time.  Stairs marked time.  Not driving.  Sleeping thru the night in recliner   Pain/symptoms eased by A. Sitting;B. Walking   Progression of symptoms since onset: Improved   Prior Level of Function   Functional Level Prior Comment Due to hip has been sleeping in recliner X 1 year.   Walks w/o device.  Drive   Current Level of Function   Patient role/employment history A. 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 No   Have you fallen and had an injury in the past year? No   Is patient a fall risk? No   Hip Objective Findings   Side (if bilateral, select both right and left) Right   Integumentary  Girth 5\" below lateral knee joint line R 48.0 cm,  L  45.2 cm   Gait/Locomotion Mild limp slower pace w/ SEC   Hip/Knee Strength Comments Pt able to lift leg independently w/ marching at " minimum 3+/5 hip flexion strength   Right Hip Flexion PROM 86*   Planned Therapy Interventions   Planned Therapy Interventions gait training;manual therapy;neuromuscular re-education;ROM;strengthening;stretching   Clinical Impression   Criteria for Skilled Therapeutic Interventions Met yes, treatment indicated   PT Diagnosis s/p R THANH   Influenced by the following impairments pain, stiffness, decreased strength   Functional limitations due to impairments walking, stairs, sleeping   Clinical Presentation Stable/Uncomplicated   Clinical Presentation Rationale progressing as expected following THANH   Clinical Decision Making (Complexity) Low complexity   Therapy Frequency other (see comments)   Predicted Duration of Therapy Intervention (days/wks) 1X/ 7-10 days X 5 visits   Risk & Benefits of therapy have been explained Yes   Patient, Family & other staff in agreement with plan of care Yes   Education Assessment   Barriers to Learning No barriers   Ortho Goal 1   Goal Description 1  Pt will be able to walk w/o device w/ slight to no limp   Target Date 09/05/18   Ortho Goal 2   Goal Description 2.  Pt will be able to do stairs reciprocally w/ minimal difficulty using 1 handrail   Target Date 09/05/18   Ortho Goal 3   Goal Description 3.  Pt will have increased hip mobility to return to driving   Target Date 09/05/18   Ortho Goal 4   Goal Description 4.  Pt will be independent and consistent w/ HEP   Target Date 09/05/18   Total Evaluation Time   Total Evaluation Time 15     Thank you for this referral,    Aleyda Myers, PT,  CEAS   [facility][id number]  [facility]  [phone]

## 2018-07-18 ENCOUNTER — DOCUMENTATION ONLY (OUTPATIENT)
Dept: OTHER | Facility: CLINIC | Age: 63
End: 2018-07-18

## 2018-07-30 ENCOUNTER — HOSPITAL ENCOUNTER (OUTPATIENT)
Dept: PHYSICAL THERAPY | Facility: CLINIC | Age: 63
Setting detail: THERAPIES SERIES
End: 2018-07-30
Attending: PHYSICIAN ASSISTANT
Payer: COMMERCIAL

## 2018-07-30 PROCEDURE — 40000718 ZZHC STATISTIC PT DEPARTMENT ORTHO VISIT: Performed by: PHYSICAL THERAPIST

## 2018-07-30 PROCEDURE — 97110 THERAPEUTIC EXERCISES: CPT | Mod: GP | Performed by: PHYSICAL THERAPIST

## 2018-09-10 NOTE — ADDENDUM NOTE
Encounter addended by: Aleyda Myers, PT on: 9/10/2018  8:47 AM<BR>     Actions taken: Sign clinical note, Flowsheet accepted, Episode resolved

## 2018-09-10 NOTE — PROGRESS NOTES
"   OUTPATIENT PHYSICAL THERAPY DISCHARGE SUMMARY   Coni Puckett PA-C 7/17/18 to 07/30/18 1400   Signing Clinician's Name / Credentials   Signing clinician's name / credentials Aleyda Myers, PT [id number]   Session Number   Session Number 2 HP   Ortho Goal 1   Goal Description 1  Pt will be able to walk w/o device w/ slight to no limp.  7/30/18 slight limp MET   Target Date 09/05/18   Ortho Goal 2   Goal Description 2.  Pt will be able to do stairs reciprocally w/ minimal difficulty using 1 handrail.  7/30/18 MET   Target Date 09/05/18   Ortho Goal 3   Goal Description 3.  Pt will have increased hip mobility to return to driving.  7/30/18 pt reports he is driving  MET   Target Date 09/05/18   Ortho Goal 4   Goal Description 4.  Pt will be independent and consistent w/ HEP 7/30/18 consistent w/ current program.  MET   Target Date 09/05/18   Subjective Report   Subjective Report Pt states pain during the day 1-3/10, @ night 5/10.   Pt is taking peroocet at night and advil during the day.  Pt is driving.    Pt notes decreased swelling.   Pt has some ongoing fatigue.    Objective Measure   Objective Measure slight limp w/o device.     Therapeutic Procedure/exercise   Treatment Detail Bridges X 10.  Hip flex w/ bent knee supine  x 5    Standing ex w/ B UE support:  marching B X 20,  hip and ext X 15  each B.  Heelraises X 15.  6\" fwd step up X 15 w/ B UE support.  Partial squats w/ B UE support X 15 (cuing on technique).    Scifit seat 14 L3 x 3 min.     Plan   Home program ex as above, ice,elevation   Plan Discharge from physical therapy as patient did not schedule further visits.     Comments   Comments Pt had met goals      "

## 2019-04-02 ENCOUNTER — APPOINTMENT (OUTPATIENT)
Dept: CT IMAGING | Facility: CLINIC | Age: 64
End: 2019-04-02
Attending: EMERGENCY MEDICINE
Payer: COMMERCIAL

## 2019-04-02 ENCOUNTER — HOSPITAL ENCOUNTER (OUTPATIENT)
Facility: CLINIC | Age: 64
Setting detail: OBSERVATION
Discharge: OTHER INSTITUTION | End: 2019-04-03
Attending: EMERGENCY MEDICINE | Admitting: FAMILY MEDICINE
Payer: COMMERCIAL

## 2019-04-02 DIAGNOSIS — R07.89 OTHER CHEST PAIN: ICD-10-CM

## 2019-04-02 DIAGNOSIS — R07.9 CHEST PAIN, UNSPECIFIED TYPE: ICD-10-CM

## 2019-04-02 PROBLEM — I10 HYPERTENSION: Status: ACTIVE | Noted: 2019-04-02

## 2019-04-02 PROBLEM — R51.9 HEADACHE: Status: ACTIVE | Noted: 2019-04-02

## 2019-04-02 PROBLEM — I67.1 CEREBRAL ANEURYSM WITHOUT RUPTURE: Status: ACTIVE | Noted: 2019-04-02

## 2019-04-02 LAB
ALBUMIN SERPL-MCNC: 4 G/DL (ref 3.4–5)
ALP SERPL-CCNC: 106 U/L (ref 40–150)
ALT SERPL W P-5'-P-CCNC: 25 U/L (ref 0–70)
ANION GAP SERPL CALCULATED.3IONS-SCNC: 6 MMOL/L (ref 3–14)
AST SERPL W P-5'-P-CCNC: 15 U/L (ref 0–45)
BASOPHILS # BLD AUTO: 0 10E9/L (ref 0–0.2)
BASOPHILS NFR BLD AUTO: 0.5 %
BILIRUB SERPL-MCNC: 0.6 MG/DL (ref 0.2–1.3)
BUN SERPL-MCNC: 17 MG/DL (ref 7–30)
CALCIUM SERPL-MCNC: 9 MG/DL (ref 8.5–10.1)
CHLORIDE SERPL-SCNC: 106 MMOL/L (ref 94–109)
CO2 SERPL-SCNC: 27 MMOL/L (ref 20–32)
CREAT SERPL-MCNC: 1.03 MG/DL (ref 0.66–1.25)
DIFFERENTIAL METHOD BLD: ABNORMAL
EOSINOPHIL # BLD AUTO: 0.1 10E9/L (ref 0–0.7)
EOSINOPHIL NFR BLD AUTO: 2.4 %
ERYTHROCYTE [DISTWIDTH] IN BLOOD BY AUTOMATED COUNT: 12.9 % (ref 10–15)
GFR SERPL CREATININE-BSD FRML MDRD: 76 ML/MIN/{1.73_M2}
GLUCOSE SERPL-MCNC: 91 MG/DL (ref 70–99)
HCT VFR BLD AUTO: 40.8 % (ref 40–53)
HGB BLD-MCNC: 13.8 G/DL (ref 13.3–17.7)
IMM GRANULOCYTES # BLD: 0 10E9/L (ref 0–0.4)
IMM GRANULOCYTES NFR BLD: 0.3 %
LYMPHOCYTES # BLD AUTO: 1.5 10E9/L (ref 0.8–5.3)
LYMPHOCYTES NFR BLD AUTO: 25.4 %
MCH RBC QN AUTO: 29.6 PG (ref 26.5–33)
MCHC RBC AUTO-ENTMCNC: 33.8 G/DL (ref 31.5–36.5)
MCV RBC AUTO: 88 FL (ref 78–100)
MONOCYTES # BLD AUTO: 0.4 10E9/L (ref 0–1.3)
MONOCYTES NFR BLD AUTO: 6.9 %
NEUTROPHILS # BLD AUTO: 3.8 10E9/L (ref 1.6–8.3)
NEUTROPHILS NFR BLD AUTO: 64.5 %
NRBC # BLD AUTO: 0 10*3/UL
NRBC BLD AUTO-RTO: 0 /100
PLATELET # BLD AUTO: 130 10E9/L (ref 150–450)
POTASSIUM SERPL-SCNC: 4.5 MMOL/L (ref 3.4–5.3)
PROT SERPL-MCNC: 6.6 G/DL (ref 6.8–8.8)
RBC # BLD AUTO: 4.66 10E12/L (ref 4.4–5.9)
SODIUM SERPL-SCNC: 139 MMOL/L (ref 133–144)
TROPONIN I SERPL-MCNC: <0.015 UG/L (ref 0–0.04)
TROPONIN I SERPL-MCNC: <0.015 UG/L (ref 0–0.04)
WBC # BLD AUTO: 5.8 10E9/L (ref 4–11)

## 2019-04-02 PROCEDURE — 74177 CT ABD & PELVIS W/CONTRAST: CPT

## 2019-04-02 PROCEDURE — 25800030 ZZH RX IP 258 OP 636: Performed by: EMERGENCY MEDICINE

## 2019-04-02 PROCEDURE — G0378 HOSPITAL OBSERVATION PER HR: HCPCS

## 2019-04-02 PROCEDURE — 70498 CT ANGIOGRAPHY NECK: CPT

## 2019-04-02 PROCEDURE — 96361 HYDRATE IV INFUSION ADD-ON: CPT

## 2019-04-02 PROCEDURE — 70450 CT HEAD/BRAIN W/O DYE: CPT

## 2019-04-02 PROCEDURE — 99285 EMERGENCY DEPT VISIT HI MDM: CPT | Mod: 25

## 2019-04-02 PROCEDURE — 99220 ZZC INITIAL OBSERVATION CARE,LEVL III: CPT | Performed by: PHYSICIAN ASSISTANT

## 2019-04-02 PROCEDURE — 85025 COMPLETE CBC W/AUTO DIFF WBC: CPT | Performed by: EMERGENCY MEDICINE

## 2019-04-02 PROCEDURE — 25000132 ZZH RX MED GY IP 250 OP 250 PS 637: Performed by: EMERGENCY MEDICINE

## 2019-04-02 PROCEDURE — 80053 COMPREHEN METABOLIC PANEL: CPT | Performed by: EMERGENCY MEDICINE

## 2019-04-02 PROCEDURE — 96360 HYDRATION IV INFUSION INIT: CPT

## 2019-04-02 PROCEDURE — 93010 ELECTROCARDIOGRAM REPORT: CPT | Mod: Z6 | Performed by: EMERGENCY MEDICINE

## 2019-04-02 PROCEDURE — 96374 THER/PROPH/DIAG INJ IV PUSH: CPT

## 2019-04-02 PROCEDURE — 25000128 H RX IP 250 OP 636: Performed by: EMERGENCY MEDICINE

## 2019-04-02 PROCEDURE — 99285 EMERGENCY DEPT VISIT HI MDM: CPT | Mod: 25 | Performed by: EMERGENCY MEDICINE

## 2019-04-02 PROCEDURE — 99207 ZZC CDG-CODE CATEGORY CHANGED: CPT | Performed by: PHYSICIAN ASSISTANT

## 2019-04-02 PROCEDURE — 25000128 H RX IP 250 OP 636: Performed by: PHYSICIAN ASSISTANT

## 2019-04-02 PROCEDURE — 25000125 ZZHC RX 250: Performed by: EMERGENCY MEDICINE

## 2019-04-02 PROCEDURE — 36415 COLL VENOUS BLD VENIPUNCTURE: CPT | Performed by: EMERGENCY MEDICINE

## 2019-04-02 PROCEDURE — 93005 ELECTROCARDIOGRAM TRACING: CPT

## 2019-04-02 PROCEDURE — 84484 ASSAY OF TROPONIN QUANT: CPT | Performed by: EMERGENCY MEDICINE

## 2019-04-02 PROCEDURE — 71260 CT THORAX DX C+: CPT

## 2019-04-02 RX ORDER — LIDOCAINE 40 MG/G
CREAM TOPICAL
Status: DISCONTINUED | OUTPATIENT
Start: 2019-04-02 | End: 2019-04-03 | Stop reason: HOSPADM

## 2019-04-02 RX ORDER — ALUMINA, MAGNESIA, AND SIMETHICONE 2400; 2400; 240 MG/30ML; MG/30ML; MG/30ML
30 SUSPENSION ORAL EVERY 4 HOURS PRN
Status: DISCONTINUED | OUTPATIENT
Start: 2019-04-02 | End: 2019-04-03 | Stop reason: HOSPADM

## 2019-04-02 RX ORDER — KETOROLAC TROMETHAMINE 30 MG/ML
30 INJECTION, SOLUTION INTRAMUSCULAR; INTRAVENOUS EVERY 6 HOURS PRN
Status: DISCONTINUED | OUTPATIENT
Start: 2019-04-02 | End: 2019-04-03 | Stop reason: HOSPADM

## 2019-04-02 RX ORDER — ACETAMINOPHEN 650 MG/1
650 SUPPOSITORY RECTAL EVERY 4 HOURS PRN
Status: DISCONTINUED | OUTPATIENT
Start: 2019-04-02 | End: 2019-04-03 | Stop reason: HOSPADM

## 2019-04-02 RX ORDER — HYDRALAZINE HYDROCHLORIDE 20 MG/ML
10 INJECTION INTRAMUSCULAR; INTRAVENOUS EVERY 6 HOURS PRN
Status: DISCONTINUED | OUTPATIENT
Start: 2019-04-02 | End: 2019-04-03 | Stop reason: HOSPADM

## 2019-04-02 RX ORDER — ASPIRIN 81 MG/1
324 TABLET, CHEWABLE ORAL ONCE
Status: COMPLETED | OUTPATIENT
Start: 2019-04-02 | End: 2019-04-02

## 2019-04-02 RX ORDER — ACETAMINOPHEN 325 MG/1
650 TABLET ORAL EVERY 4 HOURS PRN
Status: DISCONTINUED | OUTPATIENT
Start: 2019-04-02 | End: 2019-04-03 | Stop reason: HOSPADM

## 2019-04-02 RX ORDER — SODIUM CHLORIDE 9 MG/ML
INJECTION, SOLUTION INTRAVENOUS CONTINUOUS
Status: DISCONTINUED | OUTPATIENT
Start: 2019-04-02 | End: 2019-04-02

## 2019-04-02 RX ORDER — ASPIRIN 81 MG/1
81 TABLET ORAL DAILY
Status: DISCONTINUED | OUTPATIENT
Start: 2019-04-03 | End: 2019-04-03 | Stop reason: HOSPADM

## 2019-04-02 RX ORDER — IOPAMIDOL 755 MG/ML
75 INJECTION, SOLUTION INTRAVASCULAR ONCE
Status: COMPLETED | OUTPATIENT
Start: 2019-04-02 | End: 2019-04-02

## 2019-04-02 RX ORDER — OXYCODONE HYDROCHLORIDE 5 MG/1
5-10 TABLET ORAL
Status: DISCONTINUED | OUTPATIENT
Start: 2019-04-02 | End: 2019-04-03 | Stop reason: HOSPADM

## 2019-04-02 RX ORDER — ACETAMINOPHEN 500 MG
1000 TABLET ORAL ONCE
Status: COMPLETED | OUTPATIENT
Start: 2019-04-02 | End: 2019-04-02

## 2019-04-02 RX ORDER — NITROGLYCERIN 0.4 MG/1
0.4 TABLET SUBLINGUAL EVERY 5 MIN PRN
Status: DISCONTINUED | OUTPATIENT
Start: 2019-04-02 | End: 2019-04-03 | Stop reason: HOSPADM

## 2019-04-02 RX ORDER — NITROGLYCERIN 0.4 MG/1
0.4 TABLET SUBLINGUAL EVERY 5 MIN PRN
Status: DISCONTINUED | OUTPATIENT
Start: 2019-04-02 | End: 2019-04-02

## 2019-04-02 RX ORDER — NALOXONE HYDROCHLORIDE 0.4 MG/ML
.1-.4 INJECTION, SOLUTION INTRAMUSCULAR; INTRAVENOUS; SUBCUTANEOUS
Status: DISCONTINUED | OUTPATIENT
Start: 2019-04-02 | End: 2019-04-03 | Stop reason: HOSPADM

## 2019-04-02 RX ORDER — IOPAMIDOL 755 MG/ML
70 INJECTION, SOLUTION INTRAVASCULAR ONCE
Status: COMPLETED | OUTPATIENT
Start: 2019-04-02 | End: 2019-04-02

## 2019-04-02 RX ADMIN — SODIUM CHLORIDE: 9 INJECTION, SOLUTION INTRAVENOUS at 14:07

## 2019-04-02 RX ADMIN — SODIUM CHLORIDE 100 ML: 9 INJECTION, SOLUTION INTRAVENOUS at 16:30

## 2019-04-02 RX ADMIN — ASPIRIN 81 MG 324 MG: 81 TABLET ORAL at 14:07

## 2019-04-02 RX ADMIN — ACETAMINOPHEN 1000 MG: 500 TABLET ORAL at 15:43

## 2019-04-02 RX ADMIN — IOPAMIDOL 70 ML: 755 INJECTION, SOLUTION INTRAVENOUS at 16:29

## 2019-04-02 RX ADMIN — KETOROLAC TROMETHAMINE 30 MG: 30 INJECTION, SOLUTION INTRAMUSCULAR at 20:29

## 2019-04-02 RX ADMIN — IOPAMIDOL 75 ML: 755 INJECTION, SOLUTION INTRAVENOUS at 16:30

## 2019-04-02 RX ADMIN — NITROGLYCERIN 0.4 MG: 0.4 TABLET SUBLINGUAL at 14:09

## 2019-04-02 SDOH — HEALTH STABILITY: MENTAL HEALTH: HOW OFTEN DO YOU HAVE A DRINK CONTAINING ALCOHOL?: 2-4 TIMES A MONTH

## 2019-04-02 ASSESSMENT — MIFFLIN-ST. JEOR
SCORE: 2089
SCORE: 2131.25

## 2019-04-02 NOTE — ED NOTES
"..Patient has  [place] to Observation  order. Patient has been given the Observation brochure -  What does Observation mean to me.\"  Patient has been given the opportunity to ask questions about observation status and their plan of care.      Avni Winston  "

## 2019-04-02 NOTE — ED PROVIDER NOTES
"  History     Chief Complaint   Patient presents with     Chest Pain     started 30 min ago     HPI  Ludin Lance is a 63 year old male who presents to the ED with chest pain. The patient reports he was in a work meeting today at 11:30 am when he developed chest pain, a feeling of heat rising up the right side of his neck, and a headache located in the center of his head. He describes the chest pain as an 8/10 \"tight and squeezing\" sensation in the middle of the chest. The chest pain lasted for approximately one hour before subsiding and he rates the current pain a 4/10. He notes his neck still feels \"funny\" and his headache is still present. He recalls experiencing blurry vision and mild dizziness during the episode but denies any room-spinning sensations. He denies having any numbness, tingling, or weakness in the extremities. The patient reports this is the fourth time in the past two weeks he has experienced these symptoms. He reports the symptoms occur in the same combination of chest pain, warmth in the neck, and then headache each time. The onset appears to be random and symptoms usually last a couple of hours. He has had no changes in physical activity recently and denies any recent trauma. The patient denies neck pain, back pain, palpitations, nausea, shortness of breath, fever, cough, abnormal bowel movements, abdominal symptoms, and urinary symptoms. The patient sleeps with a CPAP. He recalls having reflux in the past. He denies a personal or family history of migraines. His father had heart disease but the patient attributes it to his years of heavy smoking. The patient denies smoking or illicit drug use. He uses occasional alcohol. The patient ate an apple, walnuts, and blackberries this morning at 8:30 am along with 3-4 cups of coffee.    Allergies:  Allergies   Allergen Reactions     Nkda [No Known Drug Allergies]        Problem List:    Patient Active Problem List    Diagnosis Date Noted     " "Chest pain 04/02/2019     Priority: Medium     Headache 04/02/2019     Priority: Medium     Hypertension 04/02/2019     Priority: Medium     Cerebral aneurysm without rupture 04/02/2019     Priority: Medium     Noted on CTA 4/2/2019 - 3mm of distal left A3 segment       Bacterial conjunctivitis of left eye 07/12/2018     Priority: Medium     Slow transit constipation 07/11/2018     Priority: Medium     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     Priority: Medium     EDUARDO (obstructive sleep apnea) 12/02/2014     Priority: Medium     Severe EDUARDO with significant desaturations (HST on 11/29/2014 with AHI 72.8, sanjay desat 69%)            Past Medical History:    Past Medical History:   Diagnosis Date     Sleep apnea        Past Surgical History:    Past Surgical History:   Procedure Laterality Date     ARTHROPLASTY HIP Right 7/9/2018    Procedure: ARTHROPLASTY HIP;  Right Total Hip Arthroplasty;  Surgeon: Ky Bartlett MD;  Location: [facility]     AS REMOVE TONSILS/ADENOIDS,<13 Y/O       C TOTAL KNEE ARTHROPLASTY Right 2010       Family History:    Family History   Problem Relation Age of Onset     Breast Cancer Mother      Heart Disease Father      Myocardial Infarction Father 59        MI ages 59, 72     Alcoholism Brother        Social History:  Marital Status:   [2]  Social History     Tobacco Use     Smoking status: Never Smoker     Smokeless tobacco: Never Used   Substance Use Topics     Alcohol use: Yes     Frequency: 2-4 times a month     Comment: A few beers per month     Drug use: No        Medications:      No current outpatient medications on file.      Review of Systems   All other systems are reviewed and are negative.      Physical Exam   BP: (!) 159/102  Pulse: 70  Heart Rate: 65  Temp: 97.9  F (36.6  C)  Resp: 16  Height: 177.8 cm (5' 10\")  Weight: 129.3 kg (285 lb)  SpO2: 97 %      Physical Exam  Nontoxic-appearing no respiratory distress alert and oriented x3.    Head atraumatic normocephalic    Cranial " nerves; vision baseline fields intact, PERRL, EOMI, facial sensation intact to light touch, facial muscle tone intact and symmetrical, hearing grossly intact,swallowing without difficulty, voice baseline and normal, SCM  strength intact, tongue protrudes midline.  Palatal elevation symmetric    TM's unremarkable, EACs clear, oropharynx moist without lesions or erythema.    Neck supple full active painless range of motion no posterior midline tenderness.    Carotid is normal to palpation, tenderness along the right lateral neck, no associated swelling or redness  No cervical adenopathy    Lungs clear to auscultation no rales rhonchi or wheezes    Heart regular no murmur S3 or rub    Abdomen soft nontender bowel sounds positive no masses or HSM    Strength and sensation intact throughout the extremities, skin clear from rash or lesion.      ED Course        Procedures  EKG symptoms chest pain, normal sinus rhythm rate 62, axis intervals within ST or T wave changes, read by Dr. Hai Miller             Critical Care time:  none               Results for orders placed or performed during the hospital encounter of 04/02/19 (from the past 24 hour(s))   CBC with platelets differential   Result Value Ref Range    WBC 5.8 4.0 - 11.0 10e9/L    RBC Count 4.66 4.4 - 5.9 10e12/L    Hemoglobin 13.8 13.3 - 17.7 g/dL    Hematocrit 40.8 40.0 - 53.0 %    MCV 88 78 - 100 fl    MCH 29.6 26.5 - 33.0 pg    MCHC 33.8 31.5 - 36.5 g/dL    RDW 12.9 10.0 - 15.0 %    Platelet Count 130 (L) 150 - 450 10e9/L    Diff Method Automated Method     % Neutrophils 64.5 %    % Lymphocytes 25.4 %    % Monocytes 6.9 %    % Eosinophils 2.4 %    % Basophils 0.5 %    % Immature Granulocytes 0.3 %    Nucleated RBCs 0 0 /100    Absolute Neutrophil 3.8 1.6 - 8.3 10e9/L    Absolute Lymphocytes 1.5 0.8 - 5.3 10e9/L    Absolute Monocytes 0.4 0.0 - 1.3 10e9/L    Absolute Eosinophils 0.1 0.0 - 0.7 10e9/L    Absolute Basophils 0.0 0.0 - 0.2 10e9/L    Abs Immature  Granulocytes 0.0 0 - 0.4 10e9/L    Absolute Nucleated RBC 0.0    Comprehensive metabolic panel   Result Value Ref Range    Sodium 139 133 - 144 mmol/L    Potassium 4.5 3.4 - 5.3 mmol/L    Chloride 106 94 - 109 mmol/L    Carbon Dioxide 27 20 - 32 mmol/L    Anion Gap 6 3 - 14 mmol/L    Glucose 91 70 - 99 mg/dL    Urea Nitrogen 17 7 - 30 mg/dL    Creatinine 1.03 0.66 - 1.25 mg/dL    GFR Estimate 76 >60 mL/min/[1.73_m2]    GFR Estimate If Black 88 >60 mL/min/[1.73_m2]    Calcium 9.0 8.5 - 10.1 mg/dL    Bilirubin Total 0.6 0.2 - 1.3 mg/dL    Albumin 4.0 3.4 - 5.0 g/dL    Protein Total 6.6 (L) 6.8 - 8.8 g/dL    Alkaline Phosphatase 106 40 - 150 U/L    ALT 25 0 - 70 U/L    AST 15 0 - 45 U/L   Troponin I   Result Value Ref Range    Troponin I ES <0.015 0.000 - 0.045 ug/L   CT Head w/o Contrast    Narrative    CT SCAN OF THE HEAD WITHOUT CONTRAST   4/2/2019 4:42 PM     HISTORY: Headache.    TECHNIQUE:  Axial images of the head and coronal reformations without  IV contrast material. Radiation dose for this scan was reduced using  automated exposure control, adjustment of the mA and/or kV according  to patient size, or iterative reconstruction technique.    COMPARISON: None.    FINDINGS: There is no evidence of intracranial hemorrhage, mass, acute  infarct or anomaly. The ventricles are normal in size, shape and  configuration. The brain parenchyma and subarachnoid spaces are  normal.     The visualized portions of the sinuses and mastoids appear normal. The  bony calvarium and bones of the skull base appear intact.       Impression    IMPRESSION:  No evidence of acute intracranial hemorrhage, mass, or  herniation.      ELICIA MERINO MD   CT Head Neck Angio w/o & w Contrast    Narrative    CT ANGIOGRAM OF THE HEAD AND NECK WITH CONTRAST  4/2/2019 4:44 PM     HISTORY: Intermittent chest pain radiating to right neck with  associated headache.     TECHNIQUE:  CT angiography with an injection of 70 mL Isovue -370 IV  with scans  through the head and neck. Images were transferred to a  separate 3-D workstation where multiplanar reformations and 3-D images  were created. Estimates of carotid stenoses are made relative to the  distal internal carotid artery diameters except as noted. Radiation  dose for this scan was reduced using automated exposure control,  adjustment of the mA and/or kV according to patient size, or iterative  reconstruction technique.    COMPARISON: None.     CT HEAD FINDINGS: No contrast enhancing lesions. Cerebral blood flow  is grossly normal.     CT ANGIOGRAM HEAD FINDINGS:  The major intracranial arteries including  the proximal branches of the anterior cerebral, middle cerebral, and  posterior cerebral arteries appear patent without vascular cutoff.    There appears to be mild multifocal stenosis of the intracranial  vessels involving both the anterior and posterior circulation. While  this may be artifactual due to mild motion artifact and beam  hardening, it could also represent vasculopathy such as reversible  cerebral vasoconstriction syndrome.    Focal 3 mm vascular outpouching of the distal left A3 segment  concerning for an aneurysm.    CT ANGIOGRAM NECK FINDINGS:   Normal origin of the great vessels from the aortic arch.     Right carotid artery: The right common and internal carotid arteries  are patent. Moderate soft plaque at the carotid bifurcation resulting  in approximately 20% stenosis. There appears to be ulceration of the  plaque at the carotid bifurcation.     Left carotid artery: The left common and internal carotid arteries are  patent. Mild soft plaque at the carotid bifurcation without stenosis.     Vertebral arteries: Vertebral arteries are patent without evidence of  dissection. No significant stenosis.     Other findings: There are degenerative changes in the spine.       Impression    IMPRESSION:    1. Patent proximal major intracranial arteries without vascular cutoff  identified.  2.  Apparent mild multifocal stenosis of the intracranial vessels. This  is concerning for possible vasculopathy although the findings are mild  and this could also be artifactual. Recommend clinical correlation.  3. Focal 3 mm vascular outpouching of distal left A3 segment  concerning for an aneurysm.  4. Patent arteries in the neck without evidence of dissection.  5. Moderate soft plaque at the right carotid bifurcation resulting in  approximately 20% stenosis. There appears to be ulceration of the soft  plaque at the right carotid bifurcation.  6. Mild soft plaque at the left carotid bifurcation without stenosis.    Results discussed with Hai Miller at 5:11 PM on 4/2/2019.     ELICIA MERINO MD   CT Aortic Survey w Contrast    Narrative    CT CHEST WITHOUT AND WITH CONTRAST AND CT ABDOMEN AND PELVIS WITH  CONTRAST   4/2/2019 4:58 PM     HISTORY: Intermittent chest pain radiating to the right neck with  associated headache.    COMPARISON: None.    TECHNIQUE: Note that this study was performed in conjunction with a CT  of the head and neck. Refer to a separate report for findings from  that study. Prior to the administration of intravenous contrast,  helical sections were acquired through the thoracic aorta. Following  the uneventful administration of 75 mL Isovue-370 intravenous  contrast, helical sections were acquired from the lung apices through  the aortic bifurcation according to the aorta protocol. Coronal and  sagittal reconstructions were generated. Radiation dose for this scan  was reduced using automated exposure control, adjustment of the mA  and/or kV according to the patient's size, or iterative reconstruction  technique.    FINDINGS:     Thoracoabdominal aorta: The aorta is normal in caliber without  dissection.    Chest: The lungs are clear. No pleural or pericardial effusion. No  enlarged lymph nodes in the chest.    Abdomen: The liver, spleen, pancreas, adrenal glands and kidneys are  unremarkable.  The gallbladder is present. No enlarged lymph nodes or  free fluid in the upper abdomen.    Pelvis: The small and large bowel are normal in caliber. A few colonic  diverticula are present without evidence of diverticulitis. The  appendix is unremarkable. No bowel wall thickening, pneumatosis or  free intraperitoneal gas. No enlarged lymph nodes or free fluid in the  pelvis. Partial visualization of a right hip arthroplasty.      Impression    IMPRESSION:   1. The aorta is normal in caliber without dissection.  2. No evidence of active pulmonary disease.  3. No cause of acute pain identified in the abdomen or pelvis.    KOURTNEY CHILDRESS MD   Troponin I   Result Value Ref Range    Troponin I ES <0.015 0.000 - 0.045 ug/L   Troponin I   Result Value Ref Range    Troponin I ES <0.015 0.000 - 0.045 ug/L   Echo Stress Echocardiogram    Narrative    [id number]  [id number]  [id number]  [id number]^JUAN^CORINNE^TRIPP           [facility]  Echocardiography Laboratory  [address] [place] [address]        Name: RUTH LOPEZ  MRN: [id number]  : 1955  Study Date: 2019 11:14 AM  Age: 63 yrs  Gender: Male  Patient Location: [facility]  Reason For Study: Chest Pain  Ordering Physician: CORINNE MARTINEZ  Referring Physician: Castro Lomas  Performed By: Coni Pope RDCS     BSA: 2.5 m2  Height: 70 in  Weight: 294 lb  HR: 91  BP: 130/80 mmHg  _____________________________________________________________________________  __        Procedure  Stress Echo Complete. Contrast Optison.  _____________________________________________________________________________  __        Interpretation Summary  The baseline electrocardiogram was abnormal. It displayed right bundle branch  block.  Frequent premature ventricular contractions  Target Heart Rate was achieved.  The patient developed 2 mm of ST depression in lead V3 and 1 mm in lead V4  with exercise.  Arrhythmia induced during stress: frequent PVC's.  Normal  left ventricular function and wall motion at rest and post-stress.     This was a difficult stress echo and the post stress lateral wall is not well  seen. Given his ECG changes I would recommend a Lexiscan nulcear stress test  or cardiac CT angiogram.     Technically difficult, suboptimal study.  _____________________________________________________________________________  __     Stress  The patient exercised 6:00.  There was a normal BP response to exercise.  A treadmill exercise test according to the Jean protocol was performed.  Target Heart Rate was achieved.  Arrhythmia induced during stress: frequent PVC's.  This was an abnormal stress EKG.  The patient developed 2 mm of ST depression in lead V3 and 1 mm in lead V4  with exercise.  The visual ejection fraction is estimated at 65-70%.  Normal left ventricular function and wall motion at rest and post-stress.     Baseline  The patient is in normal sinus rhythm.  The baseline electrocardiogram was abnormal. It displayed right bundle branch  block.  Frequent premature ventricular contractions.  The visual ejection fraction is estimated at 55-60%.     Stress Results         Protocol:  Jean Protocol        Maximum Predicted HR:   156 bpm         Target HR: 133 bpm               % Maximum Predicted HR: 104 %            Stage  DurationHeart Rate  BP                Comment                 (mm:ss)   (bpm)        Stage 1   3:00      129   138/78          Peak    3:00      162   158/78Term-fatigue; FAC-below; [id number]       RecoveryR  6:00      106   146/78               Stress Duration:   6:00 mm:ss *        Recovery Time: 6:00 mm:ss         Maximum Stress HR: 162 bpm *           METS:          7     Left Ventricle  The visual ejection fraction is estimated at 55-60%.     _____________________________________________________________________________  __        Doppler Measurements & Calculations     TR max le: 268.6 cm/sec  TR max P.9 mmHg            _____________________________________________________________________________  __           Report approved by: Jerad Escamilla 04/03/2019 12:14 PM          Medications   0.9% sodium chloride BOLUS (1,000 mLs Intravenous Not Given 4/2/19 1637)   hydrALAZINE (APRESOLINE) injection 10 mg (not administered)   lidocaine 1 % 0.1-1 mL (not administered)   lidocaine (LMX4) kit (not administered)   sodium chloride (PF) 0.9% PF flush 3 mL (not administered)   sodium chloride (PF) 0.9% PF flush 3 mL (10 mLs Intracatheter Given 4/3/19 0820)   aspirin EC tablet 81 mg (81 mg Oral Given 4/3/19 0818)   nitroGLYcerin (NITROSTAT) sublingual tablet 0.4 mg (not administered)   alum & mag hydroxide-simethicone (MYLANTA ES/MAALOX  ES) suspension 30 mL (not administered)   acetaminophen (TYLENOL) tablet 650 mg (650 mg Oral Given 4/3/19 0919)   acetaminophen (TYLENOL) Suppository 650 mg (not administered)   naloxone (NARCAN) injection 0.1-0.4 mg (not administered)   HOLD: Beta Blockers The evening before and the morning of procedure (not administered)   oxyCODONE (ROXICODONE) tablet 5-10 mg (not administered)   ketorolac (TORADOL) injection 30 mg (30 mg Intravenous Given 4/2/19 2029)   aspirin (ASA) chewable tablet 324 mg (324 mg Oral Given 4/2/19 1407)   acetaminophen (TYLENOL) tablet 1,000 mg (1,000 mg Oral Given 4/2/19 1543)   iopamidol (ISOVUE-370) solution 70 mL (70 mLs Intravenous Given 4/2/19 1629)   Saline Flush (100 mLs Intravenous Given 4/2/19 1630)   iopamidol (ISOVUE-370) solution 75 mL (75 mLs Intravenous Given 4/2/19 1630)   Saline Flush (100 mLs Intravenous Given 4/2/19 1630)   perflutren diluted 1mL to 2mL with saline (OPTISON) diluted injection 1 mL (1 mL Intravenous Given 4/3/19 1134)   sodium chloride (PF) 0.9% PF flush 10 mL (10 mLs Intracatheter Given 4/3/19 1134)        1:52 PM Patient assessed.     Assessments & Plan (with Medical Decision Making)  63-year-old male without vascular risk factor presents with 4  discrete episodes of chest pain associated with radiation to the right neck and right jaw and subsequent headache.  Broad differential considered including but not limited to aortic dissection, carotid artery dissection, acute coronary syndrome versus other.  His ECG is normal, troponin normal, no risk factor for or history of DVT/pulmonary embolism, no leg findings.  CBC/CMP within normal.  Aortic survey negative for dissection aneurysm or other acute abnormality of the chest, CT head CT angiogram head and neck as above significant for multifocal stenoses of the intracranial vessels, there is soft plaque at the right carotid bifurcation resulting in 20% stenosis, soft plaque at the left as well, incidental A3 branch 3 mm aneurysm without evidence for bleed.  Patient treated with aspirin and nitroglycerin, 1 sublingual resolved his chest discomfort that he rated at 4/10.  Transient bigeminy associated with administration of 1 sublingual nitroglycerin.  He does have history of reflux in the past and has been avoiding caffeine however 3 to.  He requires admission for serial troponin, neurologic checks, echocardiogram, telemetry.  Reviewed with  who accepts patient for admission.     I have reviewed the nursing notes.    I have reviewed the findings, diagnosis, plan and need for follow up with the patient.          Medication List      There are no discharge medications for this visit.         Final diagnoses:   Chest pain, unspecified type     This document serves as a record of the services and decisions personally performed and made by Hai Miller MD. It was created on HIS/HER behalf by   Loc Hill, a trained medical scribe. The creation of this document is based the provider's statements to the medical scribe.  Loc Hill 1:52 PM 4/2/2019    Provider:   The information in this document, created by the medical scribe for me, accurately reflects the services I personally performed and the  decisions made by me. I have reviewed and approved this document for accuracy prior to leaving the patient care area.  Hai Miller MD 1:52 PM 4/2/2019 4/2/2019   [facility] EMERGENCY DEPARTMENT     Hai Miller MD  04/03/19 1230       Hai Miller MD  04/03/19 1232

## 2019-04-02 NOTE — ED NOTES
"Pt used urinal, chest pain free post nitrogrlycerin. Pt said \" Oh I feel funny\" and cardiac rhythm showed bigemini on monitor. Helped back to bed, IVF infusing. MD updated. Multiple PVCs post returning to bed, feels improved, and frequent PVC.   "

## 2019-04-02 NOTE — H&P
[facility]    History and Physical - Hospitalist Service       Date of Admission:  4/2/2019    Assessment & Plan   Ludin Lance is a 63 year old male admitted on 4/2/2019. He has a past medical history significant for obstructive sleep apnea. He presents to the emergency department for evaluation of chest pain and headaches.      Chest pain  Four episodes in past 2 weeks. No personal history of heart disease. Family history significant for coronary artery disease in father, who had his first MI age 59. Otherwise no risk factors for heart disease. Concern for acute coronary syndrome vs aortic/vascular problem. Admit EKG normal sinus rhythm without ST changes. Initial troponin negative. Given SL nitroglycerine with resolution of chest pain, but brief development of PVCs and bigeminal rhythm.  - CT aorta pending to rule out dissection or vascular etiology  - Trend troponins  - Monitor on telemetry  - Stress echo tomorrow  - Prn nitroglycerine, oxycodone, O2 available    Headache  Unruptured brain aneurysm  Headache develops after chest pain starts and is associated with some right sided neck and jaw pain. Blood pressure elevated upon admission. CTA head/neck showing 3mm left A3 aneurysm (unruptured), along with various areas of possible intracerebral stenosis (although possible motion artifact). Head CT without acute intracranial abnormality. Discussed case with on-call neurosurgery, who recommended outpatient follow-up for the aneurysm in the neurosurgery clinic with either Dr. Bragg or Dr. Wiley. No acute management needed at this time without evidence of rupture.  - Address hypertension as below  - Outpatient neurosurgery visit with Dr. Bragg or Dr. Wiley upon discharge (added to discharge orders)    Hypertension  No formal diagnosis of hypertension prior to admission, states typically runs 120's/80's. Elevated in 160's in the emergency department. Stress/pain mediated vs  vascular abnormality/vasoconstriction.  - Prn hydralazine for SBP > 185      EDUARDO (obstructive sleep apnea)  OK to use CPAP with home settings       Diet: Cardiac diet, no caffeine  DVT Prophylaxis: Low Risk/Ambulatory with no VTE prophylaxis indicated  Phillips Catheter: not present  Code Status: Full - discussed with patient upon admission    Disposition Plan   Expected discharge: Tomorrow, recommended to prior living arrangement once cardiac work-up and stress test complete.  Entered: Renita Lance PA-C 04/02/2019, 4:16 PM     The patient's care was discussed with the Attending Physician, Dr. Kaleb Esposito. Plan also discussed with patient and his wife. emergency department sign out discussed with Dr. Hai Miller.    Renita Lance PA-C  [facility]    ______________________________________________________________________    Chief Complaint   Chest pain/pressure, right neck pain, headache     History is obtained from the patient, his wife, review of EMR, and emergency department sign out from Dr. Hai Miller.    History of Present Illness   Ludin Lance is a 63 year old male who presented to the emergency department for evaluation of chest pain/pressure.    He has had 4 episodes of chest pain/pressure in the past 2 weeks. Onset of pain has occurred each time when he was sitting down and was not related to any exertion. Pain is substernal with radiation up into his right neck and right jaw rated 5/10. Pain is described as a constant pressure that lasts 1-1.5 hours and then self resolves. Pain did fully resolve with nitroglycerine in the emergency department. He cannot identify any other alleviating or aggravating factors, although he does note that pain has started each time after drinking 3-4 cups of coffee (which is not atypical for him).     After about 1 hour of the chest pressure he develops a bilateral headache with some foggy vision that persists after the chest  "pain resolves. Chest pressure is associated with some nausea, lightheadedness, and feeling \"woozy.\" No associated diaphoresis, shortness of breath, syncope, palpitations, numbness, or tingling.    Patient has no known history of heart disease. Family history is significant for coronary artery disease and MI in his father - first MI age 59. Patient has no other personal cardiac risk factors.    The remainder review of systems is negative.    Review of Systems    The 10 point Review of Systems is negative other than noted in the HPI or here.     Past Medical History    I have reviewed this patient's medical history and updated it with pertinent information if needed.   Past Medical History:   Diagnosis Date     Sleep apnea        Past Surgical History   I have reviewed this patient's surgical history and updated it with pertinent information if needed.  Past Surgical History:   Procedure Laterality Date     ARTHROPLASTY HIP Right 7/9/2018    Procedure: ARTHROPLASTY HIP;  Right Total Hip Arthroplasty;  Surgeon: Ky Bartlett MD;  Location: [facility] OR     AS REMOVE TONSILS/ADENOIDS,<13 Y/O       C TOTAL KNEE ARTHROPLASTY Right 2010       Social History   I have reviewed this patient's social history and updated it with pertinent information if needed.  Social History     Tobacco Use     Smoking status: Never Smoker     Smokeless tobacco: Never Used   Substance Use Topics     Alcohol use: Yes     Frequency: 2-4 times a month     Comment: A few beers per month     Drug use: No       Family History   I have reviewed this patient's family history and updated it with pertinent information if needed.   Family History   Problem Relation Age of Onset     Breast Cancer Mother      Heart Disease Father      Myocardial Infarction Father 59        MI ages 59, 72     Alcoholism Brother        Prior to Admission Medications   Prior to Admission Medications   Prescriptions Last Dose Informant Patient Reported? Taking?   ORDER FOR " DME 4/1/2019 at hs Self Yes Yes   Sig: Equipment being ordered: Respironics Auto CPAP pressure 7-12 .   acetaminophen (TYLENOL) 325 MG tablet More than a month at Unknown time  No No   Sig: Take 2 tablets (650 mg) by mouth every 4 hours as needed for mild pain      Facility-Administered Medications: None     Allergies   Allergies   Allergen Reactions     Nkda [No Known Drug Allergies]        Physical Exam   Vital Signs: Temp: 97.9  F (36.6  C) Temp src: Oral BP: 122/82 Pulse: 53 Heart Rate: 67 Resp: 15 SpO2: 97 % O2 Device: None (Room air)    Weight: 285 lbs 0 oz    Constitutional: Alert, oriented, cooperative, no apparent distress, appears nontoxic, speaking in full sentences.     Eyes: Eyes are clear, pupils are reactive. No scleral icterus. Extroccular movements intact.     HEENT: Oropharynx is clear and moist, no lesions. Normocephalic, no evidence of cranial trauma.      Cardiovascular: Regular rhythm and rate, normal S1 and S2. No murmur, rubs, or gallops. Peripheral pulses intact bilaterally. No lower extremity edema. No carotid bruits.      Respiratory: Lung sounds are clear to auscultation bilaterally without wheezes, rhonchi, or crackles.    GI: Soft, non-distended. Non-tender, no rebound or guarding. No hepatosplenomegaly or masses appreciated. Normal bowel sounds.     Musculoskeletal: Without obvious deformity, normal range of motion. Normal muscle bulk and tone. Distal CMS intact.      Skin: Warm and dry, no rashes or ecchymoses. No mottling of skin.      Neurologic: Patient moves all extremities. Cranial nerves are grossly intact.  is symmetric. Gross strength and sensation are equal bilaterally.    Genitourinary: Deferred      Data   Data reviewed today: I reviewed all medications, new labs and imaging results over the last 24 hours. I personally reviewed the EKG tracing showing normal sinus rhythm without ST changes.    Recent Labs   Lab 04/02/19  1404   WBC 5.8   HGB 13.8   MCV 88   *       POTASSIUM 4.5   CHLORIDE 106   CO2 27   BUN 17   CR 1.03   ANIONGAP 6   NI 9.0   GLC 91   ALBUMIN 4.0   PROTTOTAL 6.6*   BILITOTAL 0.6   ALKPHOS 106   ALT 25   AST 15   TROPI <0.015     Recent Results (from the past 24 hour(s))   CT Head w/o Contrast    Narrative    CT SCAN OF THE HEAD WITHOUT CONTRAST   4/2/2019 4:42 PM     HISTORY: Headache.    TECHNIQUE:  Axial images of the head and coronal reformations without  IV contrast material. Radiation dose for this scan was reduced using  automated exposure control, adjustment of the mA and/or kV according  to patient size, or iterative reconstruction technique.    COMPARISON: None.    FINDINGS: There is no evidence of intracranial hemorrhage, mass, acute  infarct or anomaly. The ventricles are normal in size, shape and  configuration. The brain parenchyma and subarachnoid spaces are  normal.     The visualized portions of the sinuses and mastoids appear normal. The  bony calvarium and bones of the skull base appear intact.       Impression    IMPRESSION:  No evidence of acute intracranial hemorrhage, mass, or  herniation.      ELICIA MERINO MD   CT Head Neck Angio w/o & w Contrast    Narrative    CT ANGIOGRAM OF THE HEAD AND NECK WITH CONTRAST  4/2/2019 4:44 PM     HISTORY: Intermittent chest pain radiating to right neck with  associated headache.     TECHNIQUE:  CT angiography with an injection of 70 mL Isovue -370 IV  with scans through the head and neck. Images were transferred to a  separate 3-D workstation where multiplanar reformations and 3-D images  were created. Estimates of carotid stenoses are made relative to the  distal internal carotid artery diameters except as noted. Radiation  dose for this scan was reduced using automated exposure control,  adjustment of the mA and/or kV according to patient size, or iterative  reconstruction technique.    COMPARISON: None.     CT HEAD FINDINGS: No contrast enhancing lesions. Cerebral blood flow  is  grossly normal.     CT ANGIOGRAM HEAD FINDINGS:  The major intracranial arteries including  the proximal branches of the anterior cerebral, middle cerebral, and  posterior cerebral arteries appear patent without vascular cutoff.    There appears to be mild multifocal stenosis of the intracranial  vessels involving both the anterior and posterior circulation. While  this may be artifactual due to mild motion artifact and beam  hardening, it could also represent vasculopathy such as reversible  cerebral vasoconstriction syndrome.    Focal 3 mm vascular outpouching of the distal left A3 segment  concerning for an aneurysm.    CT ANGIOGRAM NECK FINDINGS:   Normal origin of the great vessels from the aortic arch.     Right carotid artery: The right common and internal carotid arteries  are patent. Moderate soft plaque at the carotid bifurcation resulting  in approximately 20% stenosis. There appears to be ulceration of the  plaque at the carotid bifurcation.     Left carotid artery: The left common and internal carotid arteries are  patent. Mild soft plaque at the carotid bifurcation without stenosis.     Vertebral arteries: Vertebral arteries are patent without evidence of  dissection. No significant stenosis.     Other findings: There are degenerative changes in the spine.       Impression    IMPRESSION:    1. Patent proximal major intracranial arteries without vascular cutoff  identified.  2. Apparent mild multifocal stenosis of the intracranial vessels. This  is concerning for possible vasculopathy although the findings are mild  and this could also be artifactual. Recommend clinical correlation.  3. Focal 3 mm vascular outpouching of distal left A3 segment  concerning for an aneurysm.  4. Patent arteries in the neck without evidence of dissection.  5. Moderate soft plaque at the right carotid bifurcation resulting in  approximately 20% stenosis. There appears to be ulceration of the soft  plaque at the right carotid  bifurcation.  6. Mild soft plaque at the left carotid bifurcation without stenosis.    Results discussed with Hai Miller at 5:11 PM on 4/2/2019.     ELICIA MERINO MD   CT Aortic Survey w Contrast    Narrative    CT CHEST WITHOUT AND WITH CONTRAST AND CT ABDOMEN AND PELVIS WITH  CONTRAST   4/2/2019 4:58 PM     HISTORY: Intermittent chest pain radiating to the right neck with  associated headache.    COMPARISON: None.    TECHNIQUE: Note that this study was performed in conjunction with a CT  of the head and neck. Refer to a separate report for findings from  that study. Prior to the administration of intravenous contrast,  helical sections were acquired through the thoracic aorta. Following  the uneventful administration of 75 mL Isovue-370 intravenous  contrast, helical sections were acquired from the lung apices through  the aortic bifurcation according to the aorta protocol. Coronal and  sagittal reconstructions were generated. Radiation dose for this scan  was reduced using automated exposure control, adjustment of the mA  and/or kV according to the patient's size, or iterative reconstruction  technique.    FINDINGS:     Thoracoabdominal aorta: The aorta is normal in caliber without  dissection.    Chest: The lungs are clear. No pleural or pericardial effusion. No  enlarged lymph nodes in the chest.    Abdomen: The liver, spleen, pancreas, adrenal glands and kidneys are  unremarkable. The gallbladder is present. No enlarged lymph nodes or  free fluid in the upper abdomen.    Pelvis: The small and large bowel are normal in caliber. A few colonic  diverticula are present without evidence of diverticulitis. The  appendix is unremarkable. No bowel wall thickening, pneumatosis or  free intraperitoneal gas. No enlarged lymph nodes or free fluid in the  pelvis. Partial visualization of a right hip arthroplasty.      Impression    IMPRESSION:   1. The aorta is normal in caliber without dissection.  2. No evidence of  active pulmonary disease.  3. No cause of acute pain identified in the abdomen or pelvis.    KOURTNEY CHILDRESS MD

## 2019-04-02 NOTE — ED NOTES
Report to Pallavi Rn MS [id number]. Pt received 1000cc NS bolus after nitroglycerin. 2nd liter not needed per provider.

## 2019-04-03 ENCOUNTER — APPOINTMENT (OUTPATIENT)
Dept: CARDIOLOGY | Facility: CLINIC | Age: 64
End: 2019-04-03
Attending: PHYSICIAN ASSISTANT
Payer: COMMERCIAL

## 2019-04-03 ENCOUNTER — HOSPITAL ENCOUNTER (INPATIENT)
Facility: CLINIC | Age: 64
LOS: 1 days | Discharge: HOME | DRG: 287 | End: 2019-04-05
Attending: INTERNAL MEDICINE | Admitting: INTERNAL MEDICINE
Payer: COMMERCIAL

## 2019-04-03 VITALS
HEART RATE: 89 BPM | WEIGHT: 294.31 LBS | DIASTOLIC BLOOD PRESSURE: 92 MMHG | SYSTOLIC BLOOD PRESSURE: 165 MMHG | TEMPERATURE: 98.7 F | BODY MASS INDEX: 42.13 KG/M2 | OXYGEN SATURATION: 94 % | RESPIRATION RATE: 18 BRPM | HEIGHT: 70 IN

## 2019-04-03 DIAGNOSIS — H10.9 BACTERIAL CONJUNCTIVITIS OF LEFT EYE: Primary | ICD-10-CM

## 2019-04-03 LAB — TROPONIN I SERPL-MCNC: <0.015 UG/L (ref 0–0.04)

## 2019-04-03 PROCEDURE — 40000264 ECHO STRESS ECHOCARDIOGRAM

## 2019-04-03 PROCEDURE — 93018 CV STRESS TEST I&R ONLY: CPT | Performed by: INTERNAL MEDICINE

## 2019-04-03 PROCEDURE — 36415 COLL VENOUS BLD VENIPUNCTURE: CPT | Performed by: EMERGENCY MEDICINE

## 2019-04-03 PROCEDURE — 99217 ZZC OBSERVATION CARE DISCHARGE: CPT | Performed by: FAMILY MEDICINE

## 2019-04-03 PROCEDURE — 99207 ZZC CDG-CODE CATEGORY CHANGED: CPT | Performed by: PHYSICIAN ASSISTANT

## 2019-04-03 PROCEDURE — G0378 HOSPITAL OBSERVATION PER HR: HCPCS

## 2019-04-03 PROCEDURE — 99220 ZZC INITIAL OBSERVATION CARE,LEVL III: CPT | Mod: AI | Performed by: PHYSICIAN ASSISTANT

## 2019-04-03 PROCEDURE — 84484 ASSAY OF TROPONIN QUANT: CPT | Performed by: EMERGENCY MEDICINE

## 2019-04-03 PROCEDURE — 25000132 ZZH RX MED GY IP 250 OP 250 PS 637: Performed by: PHYSICIAN ASSISTANT

## 2019-04-03 PROCEDURE — 93325 DOPPLER ECHO COLOR FLOW MAPG: CPT | Mod: 26 | Performed by: INTERNAL MEDICINE

## 2019-04-03 PROCEDURE — 93350 STRESS TTE ONLY: CPT | Mod: 26 | Performed by: INTERNAL MEDICINE

## 2019-04-03 PROCEDURE — 93016 CV STRESS TEST SUPVJ ONLY: CPT | Performed by: FAMILY MEDICINE

## 2019-04-03 PROCEDURE — 96376 TX/PRO/DX INJ SAME DRUG ADON: CPT | Mod: 59

## 2019-04-03 PROCEDURE — 25500064 ZZH RX 255 OP 636: Performed by: FAMILY MEDICINE

## 2019-04-03 PROCEDURE — 25000128 H RX IP 250 OP 636: Performed by: PHYSICIAN ASSISTANT

## 2019-04-03 PROCEDURE — 93321 DOPPLER ECHO F-UP/LMTD STD: CPT | Mod: 26 | Performed by: INTERNAL MEDICINE

## 2019-04-03 RX ORDER — ACETAMINOPHEN 325 MG/1
650 TABLET ORAL EVERY 4 HOURS PRN
Status: DISCONTINUED | OUTPATIENT
Start: 2019-04-03 | End: 2019-04-05

## 2019-04-03 RX ORDER — NITROGLYCERIN 0.4 MG/1
0.4 TABLET SUBLINGUAL EVERY 5 MIN PRN
Status: DISCONTINUED | OUTPATIENT
Start: 2019-04-03 | End: 2019-04-06 | Stop reason: HOSPADM

## 2019-04-03 RX ORDER — NALOXONE HYDROCHLORIDE 0.4 MG/ML
.1-.4 INJECTION, SOLUTION INTRAMUSCULAR; INTRAVENOUS; SUBCUTANEOUS
Status: DISCONTINUED | OUTPATIENT
Start: 2019-04-03 | End: 2019-04-05

## 2019-04-03 RX ORDER — ASPIRIN 81 MG/1
81 TABLET, CHEWABLE ORAL DAILY
Status: DISCONTINUED | OUTPATIENT
Start: 2019-04-04 | End: 2019-04-05

## 2019-04-03 RX ORDER — ONDANSETRON 4 MG/1
4 TABLET, ORALLY DISINTEGRATING ORAL EVERY 6 HOURS PRN
Status: DISCONTINUED | OUTPATIENT
Start: 2019-04-03 | End: 2019-04-06 | Stop reason: HOSPADM

## 2019-04-03 RX ORDER — CODEINE PHOSPHATE AND GUAIFENESIN 10; 100 MG/5ML; MG/5ML
5 SOLUTION ORAL
Status: DISCONTINUED | OUTPATIENT
Start: 2019-04-03 | End: 2019-04-06 | Stop reason: HOSPADM

## 2019-04-03 RX ORDER — ACETAMINOPHEN 650 MG/1
650 SUPPOSITORY RECTAL EVERY 4 HOURS PRN
Status: DISCONTINUED | OUTPATIENT
Start: 2019-04-03 | End: 2019-04-06 | Stop reason: HOSPADM

## 2019-04-03 RX ORDER — ONDANSETRON 2 MG/ML
4 INJECTION INTRAMUSCULAR; INTRAVENOUS EVERY 6 HOURS PRN
Status: DISCONTINUED | OUTPATIENT
Start: 2019-04-03 | End: 2019-04-06 | Stop reason: HOSPADM

## 2019-04-03 RX ORDER — HYDRALAZINE HYDROCHLORIDE 20 MG/ML
10 INJECTION INTRAMUSCULAR; INTRAVENOUS EVERY 4 HOURS PRN
Status: DISCONTINUED | OUTPATIENT
Start: 2019-04-03 | End: 2019-04-06 | Stop reason: HOSPADM

## 2019-04-03 RX ADMIN — ACETAMINOPHEN 650 MG: 325 TABLET, FILM COATED ORAL at 02:57

## 2019-04-03 RX ADMIN — HUMAN ALBUMIN MICROSPHERES AND PERFLUTREN 1 ML: 10; .22 INJECTION, SOLUTION INTRAVENOUS at 11:34

## 2019-04-03 RX ADMIN — ASPIRIN 81 MG: 81 TABLET, COATED ORAL at 08:18

## 2019-04-03 RX ADMIN — KETOROLAC TROMETHAMINE 30 MG: 30 INJECTION, SOLUTION INTRAMUSCULAR at 15:23

## 2019-04-03 RX ADMIN — ACETAMINOPHEN 650 MG: 325 TABLET, FILM COATED ORAL at 09:19

## 2019-04-03 RX ADMIN — ACETAMINOPHEN 650 MG: 325 TABLET, FILM COATED ORAL at 15:23

## 2019-04-03 ASSESSMENT — COLUMBIA-SUICIDE SEVERITY RATING SCALE - C-SSRS
2. HAVE YOU ACTUALLY HAD ANY THOUGHTS OF KILLING YOURSELF IN THE PAST MONTH?: NO
1. IN THE PAST MONTH, HAVE YOU WISHED YOU WERE DEAD OR WISHED YOU COULD GO TO SLEEP AND NOT WAKE UP?: NO
6. HAVE YOU EVER DONE ANYTHING, STARTED TO DO ANYTHING, OR PREPARED TO DO ANYTHING TO END YOUR LIFE?: NO

## 2019-04-03 NOTE — PROGRESS NOTES
[facility] [place] [facility]      Subjective:  No more pain  No difficulty breathing  No ha  Feels fine    Review of Systems:  CONSTITUTIONAL: NEGATIVE for fever, chills, change in weight  INTEGUMENTARY/SKIN: NEGATIVE for worrisome rashes, moles or lesions  EYES: NEGATIVE for vision changes or irritation  ENT/MOUTH: NEGATIVE for ear, mouth and throat problems  RESP: NEGATIVE for significant cough or SOB  BREAST: NEGATIVE for masses, tenderness or discharge  CV: NEGATIVE for chest pain, palpitations or peripheral edema  GI: NEGATIVE for nausea, abdominal pain, heartburn, or change in bowel habits  : NEGATIVE for frequency, dysuria, or hematuria  MUSCULOSKELETAL: NEGATIVE for significant arthralgias or myalgia  NEURO: NEGATIVE for weakness, dizziness or paresthesias  ENDOCRINE: NEGATIVE for temperature intolerance, skin/hair changes  HEME: NEGATIVE for bleeding problems  PSYCHIATRIC: NEGATIVE for changes in mood or affect    Physical Exam:  Vitals Were Reviewed    Patient Vitals for the past 16 hrs:   BP Temp Temp src Heart Rate Resp SpO2   04/03/19 0627 140/77 98.7  F (37.1  C) Oral 82 18 95 %   04/03/19 0254 159/79 99.1  F (37.3  C) Oral 85 18 97 %   04/02/19 2225 125/64 98.6  F (37  C) Oral 78 16 95 %   04/02/19 2017 146/82 97.9  F (36.6  C) Oral 86 18 98 %         Intake/Output Summary (Last 24 hours) at 4/3/2019 1049  Last data filed at 4/3/2019 0920  Gross per 24 hour   Intake 1600 ml   Output --   Net 1600 ml       GENERAL APPEARANCE: healthy, alert and no distress  EYES: conjunctiva clear, eyes grossly normal  RESP: lungs clear to auscultation - no rales, rhonchi or wheezes  CV: regular rate and rhythm, normal S1 S2, no S3 or S4 and no murmur, click or rub   ABDOMEN: soft, nontender, no HSM or masses and bowel sounds normal  MS: no clubbing, cyanosis; no edema  SKIN: clear without significant rashes or lesions  Neck , tmj nontender  Lab:  Recent Labs   Lab Test 04/02/19  1404 07/10/18  0542      --    POTASSIUM 4.5  --    CHLORIDE 106  --    CO2 27  --    ANIONGAP 6  --    GLC 91  --    BUN 17  --    CR 1.03 1.00   NI 9.0  --      CBC RESULTS:   Recent Labs   Lab Test 04/02/19  1404 07/12/18  0653 07/11/18  0632   WBC 5.8  --   --    RBC 4.66  --   --    HGB 13.8  --  10.6*   HCT 40.8  --   --    * 104*  --        Results for orders placed or performed during the hospital encounter of 04/02/19 (from the past 24 hour(s))   CBC with platelets differential   Result Value Ref Range    WBC 5.8 4.0 - 11.0 10e9/L    RBC Count 4.66 4.4 - 5.9 10e12/L    Hemoglobin 13.8 13.3 - 17.7 g/dL    Hematocrit 40.8 40.0 - 53.0 %    MCV 88 78 - 100 fl    MCH 29.6 26.5 - 33.0 pg    MCHC 33.8 31.5 - 36.5 g/dL    RDW 12.9 10.0 - 15.0 %    Platelet Count 130 (L) 150 - 450 10e9/L    Diff Method Automated Method     % Neutrophils 64.5 %    % Lymphocytes 25.4 %    % Monocytes 6.9 %    % Eosinophils 2.4 %    % Basophils 0.5 %    % Immature Granulocytes 0.3 %    Nucleated RBCs 0 0 /100    Absolute Neutrophil 3.8 1.6 - 8.3 10e9/L    Absolute Lymphocytes 1.5 0.8 - 5.3 10e9/L    Absolute Monocytes 0.4 0.0 - 1.3 10e9/L    Absolute Eosinophils 0.1 0.0 - 0.7 10e9/L    Absolute Basophils 0.0 0.0 - 0.2 10e9/L    Abs Immature Granulocytes 0.0 0 - 0.4 10e9/L    Absolute Nucleated RBC 0.0    Comprehensive metabolic panel   Result Value Ref Range    Sodium 139 133 - 144 mmol/L    Potassium 4.5 3.4 - 5.3 mmol/L    Chloride 106 94 - 109 mmol/L    Carbon Dioxide 27 20 - 32 mmol/L    Anion Gap 6 3 - 14 mmol/L    Glucose 91 70 - 99 mg/dL    Urea Nitrogen 17 7 - 30 mg/dL    Creatinine 1.03 0.66 - 1.25 mg/dL    GFR Estimate 76 >60 mL/min/[1.73_m2]    GFR Estimate If Black 88 >60 mL/min/[1.73_m2]    Calcium 9.0 8.5 - 10.1 mg/dL    Bilirubin Total 0.6 0.2 - 1.3 mg/dL    Albumin 4.0 3.4 - 5.0 g/dL    Protein Total 6.6 (L) 6.8 - 8.8 g/dL    Alkaline Phosphatase 106 40 - 150 U/L    ALT 25 0 - 70 U/L    AST 15 0 - 45 U/L   Troponin I   Result Value Ref  Range    Troponin I ES <0.015 0.000 - 0.045 ug/L   CT Head w/o Contrast    Narrative    CT SCAN OF THE HEAD WITHOUT CONTRAST   4/2/2019 4:42 PM     HISTORY: Headache.    TECHNIQUE:  Axial images of the head and coronal reformations without  IV contrast material. Radiation dose for this scan was reduced using  automated exposure control, adjustment of the mA and/or kV according  to patient size, or iterative reconstruction technique.    COMPARISON: None.    FINDINGS: There is no evidence of intracranial hemorrhage, mass, acute  infarct or anomaly. The ventricles are normal in size, shape and  configuration. The brain parenchyma and subarachnoid spaces are  normal.     The visualized portions of the sinuses and mastoids appear normal. The  bony calvarium and bones of the skull base appear intact.       Impression    IMPRESSION:  No evidence of acute intracranial hemorrhage, mass, or  herniation.      ELICIA MERINO MD   CT Head Neck Angio w/o & w Contrast    Narrative    CT ANGIOGRAM OF THE HEAD AND NECK WITH CONTRAST  4/2/2019 4:44 PM     HISTORY: Intermittent chest pain radiating to right neck with  associated headache.     TECHNIQUE:  CT angiography with an injection of 70 mL Isovue -370 IV  with scans through the head and neck. Images were transferred to a  separate 3-D workstation where multiplanar reformations and 3-D images  were created. Estimates of carotid stenoses are made relative to the  distal internal carotid artery diameters except as noted. Radiation  dose for this scan was reduced using automated exposure control,  adjustment of the mA and/or kV according to patient size, or iterative  reconstruction technique.    COMPARISON: None.     CT HEAD FINDINGS: No contrast enhancing lesions. Cerebral blood flow  is grossly normal.     CT ANGIOGRAM HEAD FINDINGS:  The major intracranial arteries including  the proximal branches of the anterior cerebral, middle cerebral, and  posterior cerebral arteries appear  patent without vascular cutoff.    There appears to be mild multifocal stenosis of the intracranial  vessels involving both the anterior and posterior circulation. While  this may be artifactual due to mild motion artifact and beam  hardening, it could also represent vasculopathy such as reversible  cerebral vasoconstriction syndrome.    Focal 3 mm vascular outpouching of the distal left A3 segment  concerning for an aneurysm.    CT ANGIOGRAM NECK FINDINGS:   Normal origin of the great vessels from the aortic arch.     Right carotid artery: The right common and internal carotid arteries  are patent. Moderate soft plaque at the carotid bifurcation resulting  in approximately 20% stenosis. There appears to be ulceration of the  plaque at the carotid bifurcation.     Left carotid artery: The left common and internal carotid arteries are  patent. Mild soft plaque at the carotid bifurcation without stenosis.     Vertebral arteries: Vertebral arteries are patent without evidence of  dissection. No significant stenosis.     Other findings: There are degenerative changes in the spine.       Impression    IMPRESSION:    1. Patent proximal major intracranial arteries without vascular cutoff  identified.  2. Apparent mild multifocal stenosis of the intracranial vessels. This  is concerning for possible vasculopathy although the findings are mild  and this could also be artifactual. Recommend clinical correlation.  3. Focal 3 mm vascular outpouching of distal left A3 segment  concerning for an aneurysm.  4. Patent arteries in the neck without evidence of dissection.  5. Moderate soft plaque at the right carotid bifurcation resulting in  approximately 20% stenosis. There appears to be ulceration of the soft  plaque at the right carotid bifurcation.  6. Mild soft plaque at the left carotid bifurcation without stenosis.    Results discussed with Hai Miller at 5:11 PM on 4/2/2019.     ELICIA MERINO MD   CT Aortic Survey w  Contrast    Narrative    CT CHEST WITHOUT AND WITH CONTRAST AND CT ABDOMEN AND PELVIS WITH  CONTRAST   4/2/2019 4:58 PM     HISTORY: Intermittent chest pain radiating to the right neck with  associated headache.    COMPARISON: None.    TECHNIQUE: Note that this study was performed in conjunction with a CT  of the head and neck. Refer to a separate report for findings from  that study. Prior to the administration of intravenous contrast,  helical sections were acquired through the thoracic aorta. Following  the uneventful administration of 75 mL Isovue-370 intravenous  contrast, helical sections were acquired from the lung apices through  the aortic bifurcation according to the aorta protocol. Coronal and  sagittal reconstructions were generated. Radiation dose for this scan  was reduced using automated exposure control, adjustment of the mA  and/or kV according to the patient's size, or iterative reconstruction  technique.    FINDINGS:     Thoracoabdominal aorta: The aorta is normal in caliber without  dissection.    Chest: The lungs are clear. No pleural or pericardial effusion. No  enlarged lymph nodes in the chest.    Abdomen: The liver, spleen, pancreas, adrenal glands and kidneys are  unremarkable. The gallbladder is present. No enlarged lymph nodes or  free fluid in the upper abdomen.    Pelvis: The small and large bowel are normal in caliber. A few colonic  diverticula are present without evidence of diverticulitis. The  appendix is unremarkable. No bowel wall thickening, pneumatosis or  free intraperitoneal gas. No enlarged lymph nodes or free fluid in the  pelvis. Partial visualization of a right hip arthroplasty.      Impression    IMPRESSION:   1. The aorta is normal in caliber without dissection.  2. No evidence of active pulmonary disease.  3. No cause of acute pain identified in the abdomen or pelvis.    KOURTNEY CHILDRESS MD   Troponin I   Result Value Ref Range    Troponin I ES <0.015 0.000 - 0.045 ug/L    Troponin I   Result Value Ref Range    Troponin I ES <0.015 0.000 - 0.045 ug/L       Assessment and Plan:    Ludin Lance is a 63 year old male admitted on 4/2/2019. He has a past medical history significant for obstructive sleep apnea. He presents to the emergency department for evaluation of chest pain and headaches.        Chest pain  Four episodes in past 2 weeks. No personal history of heart disease. Family history significant for coronary artery disease in father, who had his first MI age 59. Otherwise no risk factors for heart disease. Concern for acute coronary syndrome vs aortic/vascular problem. Admit EKG normal sinus rhythm without ST changes. Initial troponin negative. Given SL nitroglycerine with resolution of chest pain, but brief development of PVCs and bigeminal rhythm.    trops all neg, nl ekg, no pain, extensive vascular reis without findings that explain above, awaiting stress echo.     Headache  Unruptured brain aneurysm  Headache develops after chest pain starts and is associated with some right sided neck and jaw pain. Blood pressure elevated upon admission. CTA head/neck showing 3mm left A3 aneurysm (unruptured), along with various areas of possible intracerebral stenosis (although possible motion artifact). Head CT without acute intracranial abnormality. Discussed case with on-call neurosurgery, who recommended outpatient follow-up for the aneurysm in the neurosurgery clinic with either Dr. Bragg or Dr. Wiley. No acute management needed at this time without evidence of rupture.    Discussed with pt. Left sided finding -doesn't account for above symptoms.    Hypertension  No formal diagnosis of hypertension prior to admission, states typically runs 120's/80's. Elevated in 160's in the emergency department. Stress/pain mediated vs vascular abnormality/vasoconstriction.  - Prn hydralazine for SBP > 185        EDUARDO (obstructive sleep apnea)  OK to use CPAP with home settings          Diet:  Cardiac diet, no caffeine  DVT Prophylaxis: Low Risk/Ambulatory with no VTE prophylaxis indicated  Phillips Catheter: not present  Code Status: Full - discussed with patient upon admission      plan- stress echo, likely discharge if neg . Neurosurgery follow-up for above.  If neg stress test --etiology would remain unclear, esophageal spasm possible.      1:33 PM stress test  Pt found to be in RBBB at time of stress test--  The baseline electrocardiogram was abnormal. It displayed right bundle branch  block.  Frequent premature ventricular contractions  Target Heart Rate was achieved.  The patient developed 2 mm of ST depression in lead V3 and 1 mm in lead V4  with exercise.  Arrhythmia induced during stress: frequent PVC's.  Normal left ventricular function and wall motion at rest and post-stress.     This was a difficult stress echo and the post stress lateral wall is not well  seen. Given his ECG changes I would recommend a Lexiscan nulcear stress test  or cardiac CT angiogram.     Will discuss with on call cardiology.    3:43 PM discussed with Dr Hernández --he suggests transfer for cards evaluation.

## 2019-04-03 NOTE — PROGRESS NOTES
Pt had complaints of general body aches and mild headache. Attributes body aches to stress of the day; denies feeling ill. Denies chest pain or neck pain. Medicated with IV Toradol and reported relief.

## 2019-04-03 NOTE — PLAN OF CARE
Pt denies head ache, chest or neck pain. Woke up with the chills tonight, achy arms and reports a new cough. On RA. LS are clear. Temp 99.1. Medicated with Tylenol. CPAP in use overnight. Up independently; activity supervised.

## 2019-04-03 NOTE — PLAN OF CARE
[place] [facility] [place]    Patient admitted to room 2403 at approximately 1700 via cart from emergency room. Patient was accompanied by transport tech.     Verbal SBAR report received from Martina MORENO prior to patient arrival.     Patient ambulated to bed with stand-by assist. Patient alert and oriented X 3. Pain is controlled with current analgesics.  Medication(s) being used: acetaminophen. 0-10 Pain Scale: 5. Admission vital signs: Blood pressure 149/82, pulse 84, temperature 97.5  F (36.4  C), temperature source Oral, resp. rate 16, height 1.778 m (5' 10\"), weight 133.5 kg (294 lb 5 oz), SpO2 99 %. Patient was oriented to plan of care, call light, bed controls, tv, telephone, bathroom and visiting hours.     Risk Assessment    The following safety risks were identified during admission: none. Yellow risk band applied: NO.     Skin Initial Assessment    This writer admitted this patient and completed a full skin assessment and Ty score in the Adult PCS flowsheet. Appropriate interventions initiated as needed.     Secondary skin check completed by patient declined full skin assessment stating his skin was fine.    Ty Risk Assessment  Sensory Perception: 4-->no impairment  Moisture: 4-->rarely moist  Activity: 4-->walks frequently  Mobility: 4-->no limitation  Nutrition: 4-->excellent  Friction and Shear: 3-->no apparent problem  Ty Score: 23  Bed Support Surface: Atmos Air mattress    Started on tele. Patient states he still has headache from receiving nitroglycerin, improved after received Tylenol in the Emergency Room. Denies chest pain. Troponin level 0.015.    Pallavi Muse"

## 2019-04-03 NOTE — PROGRESS NOTES
Patient denies chest pain.  Complaining of headache.  Tylenol given with some relief.  Up in room independently.  Complaining that headache has returned.  Patient reports he normally drinks about 6 cups of coffee.  Medicated with Tylenol and IV Toradol.  Patient awaiting transfer to [facility] [place].  Report called to TIFFANIE Neri.

## 2019-04-03 NOTE — PROGRESS NOTES
S-(situation): The pt is scheduled for an exercise stress echo for indication of chest pain.     B-(background): The pt reports 4 episodes of chest pain during the past two weeks. He reports that all episodes occurred while at rest.     A-(assessment): Baseline EKG obtained prior to stress echo. EKG shows a new RBBB.     R-(recommendations): Baseline EKG reviewed with supervising MD. Maher to proceed with stress test.   Leigh Hua RN

## 2019-04-04 ENCOUNTER — APPOINTMENT (OUTPATIENT)
Dept: CARDIOLOGY | Facility: CLINIC | Age: 64
DRG: 287 | End: 2019-04-04
Attending: INTERNAL MEDICINE
Payer: COMMERCIAL

## 2019-04-04 LAB
ANION GAP SERPL CALCULATED.3IONS-SCNC: 4 MMOL/L (ref 3–14)
BUN SERPL-MCNC: 18 MG/DL (ref 7–30)
CALCIUM SERPL-MCNC: 8.8 MG/DL (ref 8.5–10.1)
CHLORIDE SERPL-SCNC: 107 MMOL/L (ref 94–109)
CO2 SERPL-SCNC: 27 MMOL/L (ref 20–32)
CREAT SERPL-MCNC: 0.99 MG/DL (ref 0.66–1.25)
ERYTHROCYTE [DISTWIDTH] IN BLOOD BY AUTOMATED COUNT: 13.3 % (ref 10–15)
GFR SERPL CREATININE-BSD FRML MDRD: 80 ML/MIN/{1.73_M2}
GLUCOSE SERPL-MCNC: 99 MG/DL (ref 70–99)
HCT VFR BLD AUTO: 38 % (ref 40–53)
HGB BLD-MCNC: 13.1 G/DL (ref 13.3–17.7)
MCH RBC QN AUTO: 29.8 PG (ref 26.5–33)
MCHC RBC AUTO-ENTMCNC: 34.5 G/DL (ref 31.5–36.5)
MCV RBC AUTO: 86 FL (ref 78–100)
PLATELET # BLD AUTO: 106 10E9/L (ref 150–450)
POTASSIUM SERPL-SCNC: 4.1 MMOL/L (ref 3.4–5.3)
RBC # BLD AUTO: 4.4 10E12/L (ref 4.4–5.9)
SODIUM SERPL-SCNC: 138 MMOL/L (ref 133–144)
WBC # BLD AUTO: 7.1 10E9/L (ref 4–11)

## 2019-04-04 PROCEDURE — 36415 COLL VENOUS BLD VENIPUNCTURE: CPT | Performed by: PHYSICIAN ASSISTANT

## 2019-04-04 PROCEDURE — 75574 CT ANGIO HRT W/3D IMAGE: CPT | Mod: 26 | Performed by: INTERNAL MEDICINE

## 2019-04-04 PROCEDURE — 25000132 ZZH RX MED GY IP 250 OP 250 PS 637: Performed by: INTERNAL MEDICINE

## 2019-04-04 PROCEDURE — 25000132 ZZH RX MED GY IP 250 OP 250 PS 637: Performed by: HOSPITALIST

## 2019-04-04 PROCEDURE — 99207 ZZC CDG-CODE CATEGORY CHANGED: CPT | Performed by: HOSPITALIST

## 2019-04-04 PROCEDURE — 75574 CT ANGIO HRT W/3D IMAGE: CPT

## 2019-04-04 PROCEDURE — 25000132 ZZH RX MED GY IP 250 OP 250 PS 637: Performed by: PHYSICIAN ASSISTANT

## 2019-04-04 PROCEDURE — 85027 COMPLETE CBC AUTOMATED: CPT | Performed by: PHYSICIAN ASSISTANT

## 2019-04-04 PROCEDURE — G0378 HOSPITAL OBSERVATION PER HR: HCPCS

## 2019-04-04 PROCEDURE — 99214 OFFICE O/P EST MOD 30 MIN: CPT | Performed by: INTERNAL MEDICINE

## 2019-04-04 PROCEDURE — 25000125 ZZHC RX 250: Performed by: INTERNAL MEDICINE

## 2019-04-04 PROCEDURE — 80048 BASIC METABOLIC PNL TOTAL CA: CPT | Performed by: PHYSICIAN ASSISTANT

## 2019-04-04 PROCEDURE — 25000128 H RX IP 250 OP 636: Performed by: INTERNAL MEDICINE

## 2019-04-04 PROCEDURE — 99225 ZZC SUBSEQUENT OBSERVATION CARE,LEVEL II: CPT | Performed by: HOSPITALIST

## 2019-04-04 RX ORDER — DIPHENHYDRAMINE HCL 25 MG
25 CAPSULE ORAL
Status: DISCONTINUED | OUTPATIENT
Start: 2019-04-04 | End: 2019-04-05 | Stop reason: HOSPADM

## 2019-04-04 RX ORDER — NITROGLYCERIN 0.4 MG/1
0.4 TABLET SUBLINGUAL
Status: COMPLETED | OUTPATIENT
Start: 2019-04-04 | End: 2019-04-04

## 2019-04-04 RX ORDER — ONDANSETRON 2 MG/ML
4 INJECTION INTRAMUSCULAR; INTRAVENOUS
Status: DISCONTINUED | OUTPATIENT
Start: 2019-04-04 | End: 2019-04-05 | Stop reason: HOSPADM

## 2019-04-04 RX ORDER — POLYMYXIN B SULFATE AND TRIMETHOPRIM 1; 10000 MG/ML; [USP'U]/ML
1 SOLUTION OPHTHALMIC
Status: DISCONTINUED | OUTPATIENT
Start: 2019-04-04 | End: 2019-04-06 | Stop reason: HOSPADM

## 2019-04-04 RX ORDER — METOPROLOL TARTRATE 50 MG
50-100 TABLET ORAL
Status: COMPLETED | OUTPATIENT
Start: 2019-04-04 | End: 2019-04-04

## 2019-04-04 RX ORDER — METOPROLOL TARTRATE 50 MG
50-100 TABLET ORAL
Status: DISCONTINUED | OUTPATIENT
Start: 2019-04-04 | End: 2019-04-05 | Stop reason: HOSPADM

## 2019-04-04 RX ORDER — ACYCLOVIR 200 MG/1
0-1 CAPSULE ORAL
Status: DISCONTINUED | OUTPATIENT
Start: 2019-04-04 | End: 2019-04-05 | Stop reason: HOSPADM

## 2019-04-04 RX ORDER — METHYLPREDNISOLONE SODIUM SUCCINATE 125 MG/2ML
125 INJECTION, POWDER, LYOPHILIZED, FOR SOLUTION INTRAMUSCULAR; INTRAVENOUS
Status: DISCONTINUED | OUTPATIENT
Start: 2019-04-04 | End: 2019-04-05 | Stop reason: HOSPADM

## 2019-04-04 RX ORDER — IOPAMIDOL 755 MG/ML
500 INJECTION, SOLUTION INTRAVASCULAR ONCE
Status: COMPLETED | OUTPATIENT
Start: 2019-04-04 | End: 2019-04-04

## 2019-04-04 RX ORDER — DIPHENHYDRAMINE HYDROCHLORIDE 50 MG/ML
25-50 INJECTION INTRAMUSCULAR; INTRAVENOUS
Status: DISCONTINUED | OUTPATIENT
Start: 2019-04-04 | End: 2019-04-05 | Stop reason: HOSPADM

## 2019-04-04 RX ORDER — METOPROLOL TARTRATE 1 MG/ML
5-15 INJECTION, SOLUTION INTRAVENOUS
Status: DISCONTINUED | OUTPATIENT
Start: 2019-04-04 | End: 2019-04-05 | Stop reason: HOSPADM

## 2019-04-04 RX ORDER — METOPROLOL TARTRATE 50 MG
50-100 TABLET ORAL
Status: DISCONTINUED | OUTPATIENT
Start: 2019-04-04 | End: 2019-04-05

## 2019-04-04 RX ADMIN — IOPAMIDOL 240 ML: 755 INJECTION, SOLUTION INTRAVENOUS at 13:52

## 2019-04-04 RX ADMIN — ACETAMINOPHEN 650 MG: 325 TABLET, FILM COATED ORAL at 06:33

## 2019-04-04 RX ADMIN — METOPROLOL TARTRATE 10 MG: 5 INJECTION INTRAVENOUS at 13:15

## 2019-04-04 RX ADMIN — NITROGLYCERIN 0.4 MG: 0.4 TABLET SUBLINGUAL at 13:12

## 2019-04-04 RX ADMIN — ASPIRIN 81 MG 81 MG: 81 TABLET ORAL at 08:36

## 2019-04-04 RX ADMIN — METOPROLOL TARTRATE 10 MG: 5 INJECTION INTRAVENOUS at 13:25

## 2019-04-04 RX ADMIN — POLYMYXIN B SULFATE AND TRIMETHOPRIM 1 DROP: 10000; 1 SOLUTION OPHTHALMIC at 17:46

## 2019-04-04 RX ADMIN — METOPROLOL TARTRATE 100 MG: 50 TABLET ORAL at 11:14

## 2019-04-04 RX ADMIN — ACETAMINOPHEN 650 MG: 325 TABLET, FILM COATED ORAL at 16:44

## 2019-04-04 RX ADMIN — POLYMYXIN B SULFATE AND TRIMETHOPRIM 1 DROP: 10000; 1 SOLUTION OPHTHALMIC at 22:03

## 2019-04-04 RX ADMIN — METOPROLOL TARTRATE 10 MG: 5 INJECTION INTRAVENOUS at 13:30

## 2019-04-04 RX ADMIN — POLYMYXIN B SULFATE AND TRIMETHOPRIM 1 DROP: 10000; 1 SOLUTION OPHTHALMIC at 14:40

## 2019-04-04 RX ADMIN — DEXTRAN 70, AND HYPROMELLOSE 2910 2 DROP: 1; 3 SOLUTION/ DROPS OPHTHALMIC at 08:36

## 2019-04-04 RX ADMIN — METOPROLOL TARTRATE 10 MG: 5 INJECTION INTRAVENOUS at 13:20

## 2019-04-04 RX ADMIN — ACETAMINOPHEN 650 MG: 325 TABLET, FILM COATED ORAL at 22:03

## 2019-04-04 RX ADMIN — NITROGLYCERIN 0.4 MG: 0.4 TABLET SUBLINGUAL at 13:34

## 2019-04-04 NOTE — PROGRESS NOTES
[facility] DISCHARGE NOTE    Patient discharged to [facility] at 7:10 PM via cart. Accompanied by spouse and EMS. Discharge information reviewed with patient and spouse, opportunity offered to ask questions. All belongings sent with patient.    Natasha Proctor

## 2019-04-04 NOTE — DISCHARGE SUMMARY
Admit Date:     04/03/2019   Discharge Date:           HISTORY OF PRESENT ILLNESS:  Ludin Brennan is a 63-year-old male with history of sleep apnea and obesity.  He presented to the emergency room after having his fourth episode of chest pain in the last 2 weeks.  Each time, the pain has occurred while sitting down.  It has been nonexertional, substernal and radiates to his right neck and right jaw.  He rated it to me at 8/10.  The pains generally lasted 1-1.5 hours and then resolves.  The pain did fully resolve with nitroglycerin in the emergency room.  He thinks that he has drank a fair amount of coffee before having all these pains.  About an hour after he developed the chest pressure he developed bilateral headache with some foggy vision.  He had no associated diaphoresis, shortness of breath, syncope, palpitations or numbness.  His only risk factor is his father's heart disease.  He had his first MI at age 58.  The patient relates this to his heavy smoking.      The patient was admitted.  His EKG was normal.  His troponins were all less than 0.015.  He remained pain-free.  He had a thorough vascular evaluation with a CT head and neck angiography and a CT aortic survey; was found to have a 3 mm vascular outpouching of the distal left A-3 segment concerning for aneurysm.  This was reviewed and discussed with [facility] who felt that this was an incidental finding and probably did not explain his syndrome.  They recommended outpatient followup at the [facility].  The patient did have some mild multifocal stenosis in his intracranial vessels.      The next morning, the patient went for a stress echo.  He was found to be in right bundle branch block.  It was noted that he had some bigeminy and some multifocal PVCs while he was in the emergency room.  He did proceed with a stress echo and it was of poor quality and Dr. Carl recommended further evaluation.   I talked to Dr. Hernández  on-call for [facility].  He recommended transfer so the patient could have a cardiology consultation for further evaluation and this was set up.  We will transfer him via ACLS.      ASSESSMENT:   1.  Chest pain episodes of unknown etiology.   2.  New right bundle branch block noted after admission.   3.  Multifocal PVCs and some bigeminy noted.   4.  A 3 mm vascular outpouching of the distal left A-3 segment concerning for aneurysm -- reviewed by [facility] who recommends outpatient followup.   5.  History of obstructive sleep apnea.      PLAN:  ACLS transfer to [facility] for further evaluation.         JACINTO VEGA MD             D: 2019   T: 2019   MT: ASHLEIGH      Name:     RUTH LOPEZ   MRN:      [id number]        Account:        [id number]   :      1955           Admit Date:     2019                                  Discharge Date:       Document: [id number]

## 2019-04-04 NOTE — DISCHARGE SUMMARY
Admit Date:     04/02/2019   Discharge Date:     04/03/2019      HISTORY OF PRESENT ILLNESS:  Ludin Brennan is a 63-year-old male with history of sleep apnea and obesity.  He presented to the emergency room after having his fourth episode of chest pain in the last 2 weeks.  Each time, the pain has occurred while sitting down.  It has been nonexertional, substernal and radiates to his right neck and right jaw.  He rated it to me at 8/10.  The pains generally lasted 1-1.5 hours and then resolves.  The pain did fully resolve with nitroglycerin in the emergency room.  He thinks that he has drank a fair amount of coffee before having all these pains.  About an hour after he developed the chest pressure he developed bilateral headache with some foggy vision.  He had no associated diaphoresis, shortness of breath, syncope, palpitations or numbness.  His only risk factor is his father's heart disease.  He had his first MI at age 58.  The patient relates this to his heavy smoking.       The patient was admitted.  His EKG was normal.  His troponins were all less than 0.015.  He remained pain-free.  He had a thorough vascular evaluation with a CT head and neck angiography and a CT aortic survey; was found to have a 3 mm vascular outpouching of the distal left A-3 segment concerning for aneurysm.  This was reviewed and discussed with [facility] who felt that this was an incidental finding and probably did not explain his syndrome.  They recommended outpatient followup at the [facility].  The patient did have some mild multifocal stenosis in his intracranial vessels.       The next morning, the patient went for a stress echo.  He was found to be in right bundle branch block.  It was noted that he had some bigeminy and some multifocal PVCs while he was in the emergency room.  He did proceed with a stress echo and it was of poor quality and Dr. Carl recommended further  evaluation.  I talked to Dr. Hernández  on-call for [facility].  He recommended transfer so the patient could have a cardiology consultation for further evaluation and this was set up.  We will transfer him via ACLS.       ASSESSMENT:   1.  Chest pain episodes of unknown etiology.   2.  New right bundle branch block noted after admission.   3.  Multifocal PVCs and some bigeminy noted.   4.  A 3 mm vascular outpouching of the distal left A-3 segment concerning for aneurysm -- reviewed by [facility] who recommends outpatient followup.   5.  History of obstructive sleep apnea.       PLAN:  ACLS transfer to [facility] for further evaluation.      Revised acct lg 19                  JACINTO VEGA MD             D: 2019   T: 2019   MT: ASHLEIGH      Name:     RUTH LOPEZ   MRN:      [id number]        Account:        [id number]   :      1955           Admit Date:     2019                                  Discharge Date: 2019      Document: [id number]

## 2019-04-04 NOTE — PLAN OF CARE
VSS. No complaints of pain. Pt up independently in room. Sating well on RA, wore home Cpap overnight.

## 2019-04-04 NOTE — PLAN OF CARE
Tele: SR PVCs, BP elevated this AM, better after getting 100mg metoprolol for CT angio. Tylenol and caffeine given for HA with some relief noted. Denies any CP. Plan for angio tomorrow. Consent signed. Will continue to monitor.

## 2019-04-04 NOTE — CONSULTS
Cardiology Consultation      Ludin Lance MRN# [id number]   YOB: 1955 Age: 63 year old   Date of Admission: 4/3/2019     Reason for consult: Chest discomfort, non diagnostic stress echo           Assessment and Plan:     1. Chest discomfort with non diagnostic stress echocardiogram    PLAN  - CT coronary angiogram             Chief Complaint:   No chief complaint on file.           History of Present Illness:   This patient is a 63 year old male with no known cardiac history who was admitted as a transfer from [facility] due to concerns about 3-4 episodes of chest discomfort over the past 2 weeks.  He describes a substernal chest tightness with radiation to the neck that lasts approximately an hour or so.  The symptoms are not exertional in nature, and generally occur at rest.  They are not associated with diaphoresis palpitations syncope or presyncope.    He was admitted to [facility] yesterday with these symptoms and underwent a stress echocardiogram that was essentially nondiagnostic.  The findings were discussed with the on-call physician and he was transferred here for further evaluation.    Currently he is chest pain-free.  Of note, he was found to have an incidental finding of an intracranial aneurysm on a CT of the head.  This was discussed with the [facility] neurosurgery team and outpatient follow-up was recommended.         Physical Exam:   Vitals were reviewed  Blood pressure 152/90, pulse 75, temperature 99  F (37.2  C), temperature source Oral, resp. rate 18, weight 131.7 kg (290 lb 5.5 oz), SpO2 95 %.  Temperatures:  Current - Temp: 99  F (37.2  C); Max - Temp  Av.7  F (37.1  C)  Min: 98.4  F (36.9  C)  Max: 99  F (37.2  C)  Respiration range: Resp  Av  Min: 18  Max: 18  Pulse range: Pulse  Av  Min: 72  Max: 89  Blood pressure range: Systolic (24hrs), Av , Min:146 , Max:165   ; Diastolic (24hrs), Av, Min:83,  Max:95    Pulse oximetry range: SpO2  Av.3 %  Min: 94 %  Max: 97 %  No intake or output data in the 24 hours ending 19 1120  Constitutional:   awake, alert, cooperative, no apparent distress, and appears stated age     Eyes:   Lids and lashes normal, pupils equal, round and reactive to light, extra ocular muscles intact, sclera clear, conjunctiva normal     Neck:   supple, symmetrical, trachea midline, no JVD     Back:   symmetric     Lungs:   No increased work of breathing, good air exchange, clear to auscultation bilaterally, no crackles or wheezing  clear to auscultation     Cardiovascular:   Normal apical impulse, regular rate and rhythm, normal S1 and S2, no S3 or S4, and no murmur noted.      Abdomen:   non-tender     Musculoskeletal:   motor strength is 5 out of 5 all extremities bilaterally     Neurologic:   Grossly nonfocal     Skin:   no bruising or bleeding     Additional findings:     No edema               Past Medical History:   I have reviewed this patient's past medical history  Past Medical History:   Diagnosis Date     Sleep apnea              Past Surgical History:   I have reviewed this patient's past surgical history  Past Surgical History:   Procedure Laterality Date     ARTHROPLASTY HIP Right 2018    Procedure: ARTHROPLASTY HIP;  Right Total Hip Arthroplasty;  Surgeon: Ky Bartlett MD;  Location: [place] OR     AS REMOVE TONSILS/ADENOIDS,<11 Y/O       C TOTAL KNEE ARTHROPLASTY Right                Social History:   I have reviewed this patient's social history  Social History     Tobacco Use     Smoking status: Never Smoker     Smokeless tobacco: Never Used   Substance Use Topics     Alcohol use: Yes     Frequency: 2-4 times a month     Comment: A few beers per month             Family History:   I have reviewed this patient's family history  Family History   Problem Relation Age of Onset     Breast Cancer Mother      Heart Disease Father      Myocardial Infarction  Father 59        MI ages 59, 72     Alcoholism Brother              Allergies:     Allergies   Allergen Reactions     Nkda [No Known Drug Allergies]              Medications:   I have reviewed this patient's current medications  Medications Prior to Admission   Medication Sig Dispense Refill Last Dose     ORDER FOR DME Equipment being ordered: Respironics Auto CPAP pressure 7-12 .   4/2/2019 at Unknown time     acetaminophen (TYLENOL) 325 MG tablet Take 2 tablets (650 mg) by mouth every 4 hours as needed for mild pain 100 tablet 0 prn     Current Facility-Administered Medications Ordered in Epic   Medication Dose Route Frequency Last Rate Last Dose     acetaminophen (TYLENOL) Suppository 650 mg  650 mg Rectal Q4H PRN         acetaminophen (TYLENOL) tablet 650 mg  650 mg Oral Q4H PRN   650 mg at 04/04/19 0633     aspirin (ASA) chewable tablet 81 mg  81 mg Oral Daily   81 mg at 04/04/19 0836     guaiFENesin (ROBITUSSIN) 20 mg/mL solution 10 mL  10 mL Oral Q4H PRN         guaiFENesin-codeine (ROBITUSSIN AC) 100-10 MG/5ML solution 5 mL  5 mL Oral At Bedtime PRN         hydrALAZINE (APRESOLINE) injection 10 mg  10 mg Intravenous Q4H PRN         hypromellose-dextran (ARTIFICAL TEARS) 0.1-0.3 % ophthalmic solution 2 drop  2 drop Ophthalmic Q1H PRN   2 drop at 04/04/19 0836     melatonin tablet 1 mg  1 mg Oral At Bedtime PRN         naloxone (NARCAN) injection 0.1-0.4 mg  0.1-0.4 mg Intravenous Q2 Min PRN         nitroGLYcerin (NITROSTAT) sublingual tablet 0.4 mg  0.4 mg Sublingual Q5 Min PRN         ondansetron (ZOFRAN-ODT) ODT tab 4 mg  4 mg Oral Q6H PRN        Or     ondansetron (ZOFRAN) injection 4 mg  4 mg Intravenous Q6H PRN         No current [facility]-ordered outpatient medications on file.             Review of Systems:   The 10 point Review of Systems is negative other than noted in the HPI            Data:   All laboratory data reviewed  Results for orders placed or performed during the hospital encounter of 04/03/19  (from the past 24 hour(s))   Basic metabolic panel   Result Value Ref Range    Sodium 138 133 - 144 mmol/L    Potassium 4.1 3.4 - 5.3 mmol/L    Chloride 107 94 - 109 mmol/L    Carbon Dioxide 27 20 - 32 mmol/L    Anion Gap 4 3 - 14 mmol/L    Glucose 99 70 - 99 mg/dL    Urea Nitrogen 18 7 - 30 mg/dL    Creatinine 0.99 0.66 - 1.25 mg/dL    GFR Estimate 80 >60 mL/min/[1.73_m2]    GFR Estimate If Black >90 >60 mL/min/[1.73_m2]    Calcium 8.8 8.5 - 10.1 mg/dL   CBC with platelets   Result Value Ref Range    WBC 7.1 4.0 - 11.0 10e9/L    RBC Count 4.40 4.4 - 5.9 10e12/L    Hemoglobin 13.1 (L) 13.3 - 17.7 g/dL    Hematocrit 38.0 (L) 40.0 - 53.0 %    MCV 86 78 - 100 fl    MCH 29.8 26.5 - 33.0 pg    MCHC 34.5 31.5 - 36.5 g/dL    RDW 13.3 10.0 - 15.0 %    Platelet Count 106 (L) 150 - 450 10e9/L     EKG results: NSR with no acute St T wave changes

## 2019-04-04 NOTE — H&P
"Admitted:     04/03/2019      CHIEF COMPLAINT:  Chest pain.      HISTORY OF PRESENT ILLNESS:  Ludin Lance is a 63-year-old gentleman with a history of obstructive sleep apnea who is a direct admission to [facility] from [facility] for Cardiology evaluation in light of abnormal stress testing.  The patient presented to [facility] Emergency Department on 04/02/2019 for evaluation of chest pain and headache.  He reports that prior to admission while sitting in a meeting, he developed a right-sided chest pressure with radiation to his jaw and development of a severe headache at the same time.  The symptoms lasted an hour and he ultimately presented to the Emergency Department where he received nitroglycerin with improvement.  He reports in the past 3 weeks he has had 3 additional similar episodes, all lasting the same amount of time and all with similar characteristics.  He denies associated shortness of breath, dizziness, palpitations.  Again, he does note that when his chest pressure develops, he develops a significant headache with some \"fogginess\" and reports that while he has this headache he feels \"off.\"  He has not noticed any decreased exercise tolerance.  He actually feels that his symptoms improve when he gets up and walks around a little bit.  He has no personal cardiac history and no known risk factors aside from sleep apnea and obesity.  He was admitted to [facility] where serial troponins were followed and remained negative x3.  A CT aortic survey was obtained and negative for dissection.  Due to his complaints of headaches, he had a noncontrast head CT which was unremarkable, as well as a CT angiogram of head and neck, which showed mild multifocal stenosis in intracranial vessels, potentially concerning for vasculopathy versus artifact.  A 3 mL vascular outpouching was also identified in the left A-3 segment concerning for aneurysm.  On-call Neurosurgery " was consulted and recommended outpatient followup without any acute intervention.  The patient ultimately underwent a stress echocardiogram for further evaluation of his chest pain that showed a baseline right bundle branch block and development of some ST depression in leads V3 and V4.  Overall, finding was difficult to interpret and further evaluation with Lexiscan versus CT coronary angiogram was recommended.  On-call Cardiology was contacted and recommended transfer to [facility] for ongoing Cardiology evaluation.  The patient is presently evaluated in his room in the [facility].  He denies any chest pain or shortness of breath at this time.  He reports he has had no chest pain since admission to [facility].  He notes that yesterday after admission, he developed some cold symptoms including rhinorrhea, itchy and watery eyes and a dry cough.  He denies fevers, shortness of breath.  He is overall feeling well at this time.      REVIEW OF SYSTEMS:  A 10-point review of systems was conducted and is negative aside from the information in the HPI.      PAST MEDICAL HISTORY:  EDUARDO, on CPAP.      PAST SURGICAL HISTORY:    Past Surgical History:   Procedure Laterality Date     ARTHROPLASTY HIP Right 7/9/2018    Procedure: ARTHROPLASTY HIP;  Right Total Hip Arthroplasty;  Surgeon: Ky Bartlett MD;  Location: [facility] OR     AS REMOVE TONSILS/ADENOIDS,<11 Y/O       C TOTAL KNEE ARTHROPLASTY Right 2010     CV HEART CATHETERIZATION WITH POSSIBLE INTERVENTION N/A 4/5/2019    Procedure: Heart Catheterization with possible Intervention;  Surgeon: Bobo Corado MD;  Location:  HEART CARDIAC CATH LAB     CV LEFT VENTRICULOGRAM N/A 4/5/2019    Procedure: Left Ventriculogram;  Surgeon: Bobo Corado MD;  Location:  HEART CARDIAC CATH LAB         ALLERGIES:  NO KNOWN DRUG ALLERGIES.      PRIOR TO ADMISSION MEDICATIONS:  Acetaminophen p.r.n.      SOCIAL HISTORY:  The patient reports 4-5 alcoholic beverages per week.   Denies drug use.  He has no smoking history.  He lives in [place] and works as a professional  and owns a music store.      FAMILY HISTORY:  His father had history of coronary artery disease with his first MI at age 59, he reports he was a very heavy smoker and smoked 4 pack of cigarettes per day.      LABORATORY DATA:  CMP from 04/02 notable for creatinine 1.03 with normal electrolytes, normal LFTs.  Troponin is less than 0.015 x3.  White blood cell count is 5.8, hemoglobin 13.8, hematocrit 40.8 and platelets are 130.      PHYSICAL EXAMINATION:   VITAL SIGNS:  Temperature 98.4, heart rate 76, blood pressure 146/95, respiratory rate 18, oxygen saturation is 97% on room air.   GENERAL:  Alert and oriented gentleman, sitting up in bed, appears comfortable and is appropriately conversant.   EYES:  Pupils are equal and reactive to light, EOMI.  Mild scleral injection bilaterally, watery.   ENT:  Mucous membranes are moist.  No erythema of the posterior pharynx.  No sinus tenderness.   CARDIOVASCULAR:  Regular rate and rhythm, no murmurs appreciated.   RESPIRATORY:  Lungs are clear to auscultation bilaterally, no increased work of breathing or wheezing.  No crackles.  Occasional dry cough noted.   GASTROINTESTINAL:  Positive bowel sounds.  Abdomen is soft, nontender to palpation.  Obese.   EXTREMITIES:  Warm and well perfused.  No lower extremity edema.   NEUROLOGIC:  Cranial nerves II-XII are grossly intact.  No focal deficits.   MUSCULOSKELETAL:  The patient moves all 4 extremities.  Normal tone.      ASSESSMENT AND PLAN:  Ludin Lance is a pleasant 63-year-old gentleman with a history of obstructive sleep apnea, on CPAP, was a direct admission to [facility] from [facility] for Cardiology evaluation in the setting of intermittent chest pain with abnormal stress test.   1.  Chest pain with abnormal stress test.  The patient reports 4 episodes of chest pain over the past 3  weeks with both typical and atypical features.  These episodes have all occurred at rest and lasted approximately 1 hour.  He describes substernal chest pressure with radiation to the jaw with associated headache.  He reports symptoms actually improved with exertion.  Risk factors include obesity, EDUARDO.  Workup at [facility] notable for a negative troponin x3.  No evidence of dissection on aortic survey.  He underwent a stress echocardiogram which showed some ST depression in V3 and V4 with exercise, prompting transfer for Cardiology evaluation.  He is pain-free on admission.   -- Cardiology consulted, appreciate assistance.   -- We will make n.p.o. at midnight pending Cardiology plan.   -- We will continue daily aspirin started on admission at [facility].   -- Nitroglycerin p.r.n.   2.  Headaches, unruptured brain aneurysm.  The patient reports a headache associated with episodes of chest pain.  Blood pressure was notably elevated on admission to [facility].  CT angiogram of head and neck showed 3 mm left A-3 aneurysm, unruptured, along with multifocal areas of possible intracerebral stenosis versus artifact.  Noncontrast head CT without acute findings.  Hospitalist at [facility] discussed with the on-call Neurosurgery who recommended outpatient followup.   -- Outpatient Neurosurgery followup to be arranged prior to discharge.   -- If Cardiology plan does include angiogram,  Neurosurgery should be consulted for discussion regarding potential need for anticoagulation in light of known aneurysm.   3.  Elevated blood pressure.  The patient has no formal history of hypertension and has never been on medications.  He reports his blood pressure typically runs 120s/80s.  BP elevated in the Emergency Department at [facility].  Elevated again on admission here at 146/95.   -- PRN hydralazine.   -- Continue to monitor trend, may need to start antihypertensives If persistently elevated.   4.  Obstructive sleep apnea.   Continue CPAP per home settings.   5.  Cough, rhinorrhea.  Suspect a viral upper respiratory tract infection.  CT of the chest without infiltrates and white count normal on .  He is afebrile with a benign lung exam and vital signs.   -- Will order Robitussin p.r.n.   -- Consider antihistamines if no improvement tomorrow.      CODE STATUS:  The patient is full code.      The patient was seen and examined with Dr. Shyanne Singh, who agrees with the above plan.         SHYANNE SINGH MD       As dictated by DHEERAJ HANNON PA-C            D: 2019   T: 2019   MT: ASHLEIGH      Name:     RUTH LOPEZ   MRN:      [id number]        Account:      [id number]   :      1955        Admitted:     2019                   Document: [id number]       cc: Castro Lomas MD

## 2019-04-04 NOTE — PROGRESS NOTES
[facility]    Medicine Progress Note - Hospitalist Service       Date of Admission:  4/3/2019  Assessment & Plan   Ludin Lance is a pleasant 63-year-old gentleman with a history of obstructive sleep apnea, on CPAP, was a direct admission to [facility] from [facility] for Cardiology evaluation in the setting of intermittent chest pain with abnormal stress test.     1.  Chest pain with abnormal stress test.    The patient reports 4 episodes of chest pain over the past 3 weeks with both typical and atypical features.  These episodes have all occurred at rest and lasted approximately 1 hour.  He describes substernal chest pressure with radiation to the jaw with associated headache.  He reports symptoms actually improved with exertion.  Risk factors include obesity, EDUARDO.  Workup at [facility] notable for a negative troponin x3.  No evidence of dissection on aortic survey.  He underwent a stress echocardiogram which showed some ST depression in V3 and V4 with exercise, prompting transfer for Cardiology evaluation.  He is pain-free on admission.   - Cardiology consulted, appreciate assistance.   - Daily ASA started on admission at [facility].   - Nitroglycerin p.r.n.   - CT coronary angio ordered per Cardiology    2.  Headaches, unruptured brain aneurysm.    The patient reports a headache associated with episodes of chest pain.  Blood pressure was notably elevated on admission to [facility].  CT angiogram of head and neck showed 3 mm left A-3 aneurysm, unruptured, along with multifocal areas of possible intracerebral stenosis versus artifact.  Noncontrast head CT without acute findings.  Hospitalist at [facility] discussed with the on-call Neurosurgery who recommended outpatient followup.   - Outpatient Neurosurgery followup to be arranged prior to discharge.   - If Cardiology plan does include angiogram,  Neurosurgery should be consulted for discussion regarding potential need for  "anticoagulation in light of known aneurysm.     3.  Elevated blood pressure.    No formal HTN history, no meds; elevated at [facility] ED - 140s/90s here  - may be situational - \"white coat\" or pain/stress induced  - PRN hydralazine.   - Continue to monitor     4.  Obstructive sleep apnea.    Continue CPAP per home settings.     5.  Cough, rhinorrhea.    Suspect a viral upper respiratory tract infection.  CT of the chest without infiltrates and white count normal on 04/02.  He is afebrile with a benign lung exam and vital signs.   - Robitussin p.r.n.   - Consider antihistamines     6. Conjunctivitis, possibly bacterial  - reports getting them every time he is in the hospital  - will start antibiotic drops, continue rewetting     Diet: NPO per Anesthesia Guidelines for Procedure/Surgery Except for: Meds    DVT Prophylaxis: Pneumatic Compression Devices  Phillips Catheter: not present  Code Status: Full Code      Disposition Plan   Expected discharge: Pending cardiac evaluation.    Entered: Ken Hart MD 04/04/2019, 11:22 AM       The patient's care was discussed with the Bedside Nurse and Patient.    Ken Hart MD  Hospitalist Service  [facility]    ______________________________________________________________________    Interval History   No new complaints other than itchy eyes - history of bacterial conjunctivitis reported during prior admission. No EOM pain on movement. No f/c/n/v, chest pain, palptns, or SOB. Symptoms have not recurred since transferring here last night.     Data reviewed today: I reviewed all medications, new labs and imaging results over the last 24 hours. I personally reviewed no images or EKG's today.    Physical Exam   Vital Signs: Temp: 99  F (37.2  C) Temp src: Oral BP: 152/90 Pulse: 75   Resp: 18 SpO2: 95 % O2 Device: None (Room air)    Weight: 290 lbs 5.53 oz    Gen: NAD, pleasant  HEENT: Normocephalic, EOMI, MMM  Resp: no crackles or wheezes, no increased work of " resp  CV: S1S2 heard, reg rhythm, reg rate, no pitting pedal edema  Abdo: soft, nontender, nondistended, bowel sounds present  Ext: calves nontender, well perfused  Neuro: AAOx3, CN grossly intact, no facial asymmetry      Data   Recent Labs   Lab 04/04/19  0539 04/03/19  0000 04/02/19  1804 04/02/19  1404   WBC 7.1  --   --  5.8   HGB 13.1*  --   --  13.8   MCV 86  --   --  88   *  --   --  130*     --   --  139   POTASSIUM 4.1  --   --  4.5   CHLORIDE 107  --   --  106   CO2 27  --   --  27   BUN 18  --   --  17   CR 0.99  --   --  1.03   ANIONGAP 4  --   --  6   NI 8.8  --   --  9.0   GLC 99  --   --  91   ALBUMIN  --   --   --  4.0   PROTTOTAL  --   --   --  6.6*   BILITOTAL  --   --   --  0.6   ALKPHOS  --   --   --  106   ALT  --   --   --  25   AST  --   --   --  15   TROPI  --  <0.015 <0.015 <0.015     No results found for this or any previous visit (from the past 24 hour(s)).

## 2019-04-05 ENCOUNTER — SURGERY (OUTPATIENT)
Age: 64
End: 2019-04-05
Payer: COMMERCIAL

## 2019-04-05 VITALS
BODY MASS INDEX: 41.17 KG/M2 | SYSTOLIC BLOOD PRESSURE: 132 MMHG | OXYGEN SATURATION: 97 % | RESPIRATION RATE: 20 BRPM | TEMPERATURE: 98.3 F | HEART RATE: 99 BPM | DIASTOLIC BLOOD PRESSURE: 80 MMHG | WEIGHT: 286.9 LBS

## 2019-04-05 LAB — CATH EF ESTIMATED: 53 %

## 2019-04-05 PROCEDURE — 40000065 ZZH STATISTIC EKG NON-CHARGEABLE

## 2019-04-05 PROCEDURE — 93005 ELECTROCARDIOGRAM TRACING: CPT

## 2019-04-05 PROCEDURE — 25000128 H RX IP 250 OP 636: Performed by: INTERNAL MEDICINE

## 2019-04-05 PROCEDURE — 25000125 ZZHC RX 250: Performed by: INTERNAL MEDICINE

## 2019-04-05 PROCEDURE — 99153 MOD SED SAME PHYS/QHP EA: CPT | Performed by: INTERNAL MEDICINE

## 2019-04-05 PROCEDURE — B2151ZZ FLUOROSCOPY OF LEFT HEART USING LOW OSMOLAR CONTRAST: ICD-10-PCS | Performed by: INTERNAL MEDICINE

## 2019-04-05 PROCEDURE — 93010 ELECTROCARDIOGRAM REPORT: CPT | Performed by: INTERNAL MEDICINE

## 2019-04-05 PROCEDURE — 99152 MOD SED SAME PHYS/QHP 5/>YRS: CPT | Performed by: INTERNAL MEDICINE

## 2019-04-05 PROCEDURE — 25800030 ZZH RX IP 258 OP 636: Performed by: INTERNAL MEDICINE

## 2019-04-05 PROCEDURE — 25000132 ZZH RX MED GY IP 250 OP 250 PS 637: Performed by: PHYSICIAN ASSISTANT

## 2019-04-05 PROCEDURE — 99217 ZZC OBSERVATION CARE DISCHARGE: CPT | Performed by: INTERNAL MEDICINE

## 2019-04-05 PROCEDURE — 93458 L HRT ARTERY/VENTRICLE ANGIO: CPT | Mod: 26 | Performed by: INTERNAL MEDICINE

## 2019-04-05 PROCEDURE — G0378 HOSPITAL OBSERVATION PER HR: HCPCS

## 2019-04-05 PROCEDURE — B2111ZZ FLUOROSCOPY OF MULTIPLE CORONARY ARTERIES USING LOW OSMOLAR CONTRAST: ICD-10-PCS | Performed by: INTERNAL MEDICINE

## 2019-04-05 PROCEDURE — 93458 L HRT ARTERY/VENTRICLE ANGIO: CPT | Performed by: INTERNAL MEDICINE

## 2019-04-05 PROCEDURE — 21000001 ZZH R&B HEART CARE

## 2019-04-05 PROCEDURE — C1769 GUIDE WIRE: HCPCS | Performed by: INTERNAL MEDICINE

## 2019-04-05 PROCEDURE — 27210794 ZZH OR GENERAL SUPPLY STERILE: Performed by: INTERNAL MEDICINE

## 2019-04-05 PROCEDURE — 4A023N7 MEASUREMENT OF CARDIAC SAMPLING AND PRESSURE, LEFT HEART, PERCUTANEOUS APPROACH: ICD-10-PCS | Performed by: INTERNAL MEDICINE

## 2019-04-05 PROCEDURE — 99232 SBSQ HOSP IP/OBS MODERATE 35: CPT | Performed by: INTERNAL MEDICINE

## 2019-04-05 RX ORDER — SODIUM CHLORIDE 9 MG/ML
INJECTION, SOLUTION INTRAVENOUS CONTINUOUS
Status: DISCONTINUED | OUTPATIENT
Start: 2019-04-05 | End: 2019-04-05 | Stop reason: HOSPADM

## 2019-04-05 RX ORDER — IOPAMIDOL 755 MG/ML
INJECTION, SOLUTION INTRAVASCULAR
Status: DISCONTINUED | OUTPATIENT
Start: 2019-04-05 | End: 2019-04-05 | Stop reason: HOSPADM

## 2019-04-05 RX ORDER — SODIUM CHLORIDE 9 MG/ML
INJECTION, SOLUTION INTRAVENOUS CONTINUOUS
Status: DISCONTINUED | OUTPATIENT
Start: 2019-04-05 | End: 2019-04-06 | Stop reason: HOSPADM

## 2019-04-05 RX ORDER — DOPAMINE HYDROCHLORIDE 160 MG/100ML
2-20 INJECTION, SOLUTION INTRAVENOUS CONTINUOUS PRN
Status: DISCONTINUED | OUTPATIENT
Start: 2019-04-05 | End: 2019-04-05 | Stop reason: HOSPADM

## 2019-04-05 RX ORDER — FENTANYL CITRATE 50 UG/ML
25-50 INJECTION, SOLUTION INTRAMUSCULAR; INTRAVENOUS
Status: DISCONTINUED | OUTPATIENT
Start: 2019-04-05 | End: 2019-04-06 | Stop reason: HOSPADM

## 2019-04-05 RX ORDER — NALOXONE HYDROCHLORIDE 0.4 MG/ML
.1-.4 INJECTION, SOLUTION INTRAMUSCULAR; INTRAVENOUS; SUBCUTANEOUS
Status: DISCONTINUED | OUTPATIENT
Start: 2019-04-05 | End: 2019-04-06 | Stop reason: HOSPADM

## 2019-04-05 RX ORDER — EPTIFIBATIDE 2 MG/ML
15 INJECTION, SOLUTION INTRAVENOUS CONTINUOUS PRN
Status: DISCONTINUED | OUTPATIENT
Start: 2019-04-05 | End: 2019-04-05 | Stop reason: HOSPADM

## 2019-04-05 RX ORDER — POLYMYXIN B SULFATE AND TRIMETHOPRIM 1; 10000 MG/ML; [USP'U]/ML
2 SOLUTION OPHTHALMIC EVERY 8 HOURS
Qty: 1 BOTTLE | Refills: 0 | Status: SHIPPED | OUTPATIENT
Start: 2019-04-05 | End: 2019-06-17

## 2019-04-05 RX ORDER — DOBUTAMINE HYDROCHLORIDE 200 MG/100ML
2-20 INJECTION INTRAVENOUS CONTINUOUS PRN
Status: DISCONTINUED | OUTPATIENT
Start: 2019-04-05 | End: 2019-04-05 | Stop reason: HOSPADM

## 2019-04-05 RX ORDER — ASPIRIN 81 MG/1
81 TABLET ORAL DAILY
Status: DISCONTINUED | OUTPATIENT
Start: 2019-04-06 | End: 2019-04-06 | Stop reason: HOSPADM

## 2019-04-05 RX ORDER — NOREPINEPHRINE BITARTRATE/D5W 16MG/250ML
.03-.4 PLASTIC BAG, INJECTION (ML) INTRAVENOUS CONTINUOUS PRN
Status: DISCONTINUED | OUTPATIENT
Start: 2019-04-05 | End: 2019-04-05 | Stop reason: HOSPADM

## 2019-04-05 RX ORDER — FENTANYL CITRATE 50 UG/ML
INJECTION, SOLUTION INTRAMUSCULAR; INTRAVENOUS
Status: DISCONTINUED | OUTPATIENT
Start: 2019-04-05 | End: 2019-04-05 | Stop reason: HOSPADM

## 2019-04-05 RX ORDER — LIDOCAINE 40 MG/G
CREAM TOPICAL
Status: DISCONTINUED | OUTPATIENT
Start: 2019-04-05 | End: 2019-04-05 | Stop reason: HOSPADM

## 2019-04-05 RX ORDER — LIDOCAINE 40 MG/G
CREAM TOPICAL
Status: DISCONTINUED | OUTPATIENT
Start: 2019-04-05 | End: 2019-04-06 | Stop reason: HOSPADM

## 2019-04-05 RX ORDER — FLUMAZENIL 0.1 MG/ML
0.2 INJECTION, SOLUTION INTRAVENOUS
Status: DISCONTINUED | OUTPATIENT
Start: 2019-04-05 | End: 2019-04-06 | Stop reason: HOSPADM

## 2019-04-05 RX ORDER — NALOXONE HYDROCHLORIDE 0.4 MG/ML
.2-.4 INJECTION, SOLUTION INTRAMUSCULAR; INTRAVENOUS; SUBCUTANEOUS
Status: DISCONTINUED | OUTPATIENT
Start: 2019-04-05 | End: 2019-04-06 | Stop reason: HOSPADM

## 2019-04-05 RX ORDER — ACETAMINOPHEN 325 MG/1
325-650 TABLET ORAL EVERY 4 HOURS PRN
Status: DISCONTINUED | OUTPATIENT
Start: 2019-04-05 | End: 2019-04-06 | Stop reason: HOSPADM

## 2019-04-05 RX ORDER — ATROPINE SULFATE 0.1 MG/ML
0.5 INJECTION INTRAVENOUS EVERY 5 MIN PRN
Status: DISCONTINUED | OUTPATIENT
Start: 2019-04-05 | End: 2019-04-06 | Stop reason: HOSPADM

## 2019-04-05 RX ORDER — LORAZEPAM 2 MG/ML
0.5 INJECTION INTRAMUSCULAR
Status: DISCONTINUED | OUTPATIENT
Start: 2019-04-05 | End: 2019-04-05 | Stop reason: HOSPADM

## 2019-04-05 RX ORDER — EPTIFIBATIDE 2 MG/ML
180 INJECTION, SOLUTION INTRAVENOUS EVERY 10 MIN PRN
Status: DISCONTINUED | OUTPATIENT
Start: 2019-04-05 | End: 2019-04-05 | Stop reason: HOSPADM

## 2019-04-05 RX ORDER — ARGATROBAN 1 MG/ML
350 INJECTION, SOLUTION INTRAVENOUS
Status: DISCONTINUED | OUTPATIENT
Start: 2019-04-05 | End: 2019-04-05 | Stop reason: HOSPADM

## 2019-04-05 RX ORDER — ARGATROBAN 1 MG/ML
150 INJECTION, SOLUTION INTRAVENOUS
Status: DISCONTINUED | OUTPATIENT
Start: 2019-04-05 | End: 2019-04-05 | Stop reason: HOSPADM

## 2019-04-05 RX ORDER — HYDROCODONE BITARTRATE AND ACETAMINOPHEN 5; 325 MG/1; MG/1
1-2 TABLET ORAL EVERY 4 HOURS PRN
Status: DISCONTINUED | OUTPATIENT
Start: 2019-04-05 | End: 2019-04-06 | Stop reason: HOSPADM

## 2019-04-05 RX ORDER — LORAZEPAM 0.5 MG/1
0.5 TABLET ORAL
Status: DISCONTINUED | OUTPATIENT
Start: 2019-04-05 | End: 2019-04-05 | Stop reason: HOSPADM

## 2019-04-05 RX ORDER — POTASSIUM CHLORIDE 1500 MG/1
20 TABLET, EXTENDED RELEASE ORAL
Status: DISCONTINUED | OUTPATIENT
Start: 2019-04-05 | End: 2019-04-05 | Stop reason: HOSPADM

## 2019-04-05 RX ORDER — NITROGLYCERIN 20 MG/100ML
.07-1.54 INJECTION INTRAVENOUS CONTINUOUS PRN
Status: DISCONTINUED | OUTPATIENT
Start: 2019-04-05 | End: 2019-04-05 | Stop reason: HOSPADM

## 2019-04-05 RX ADMIN — LIDOCAINE HYDROCHLORIDE 10 ML: 10 INJECTION, SOLUTION INFILTRATION; PERINEURAL at 15:09

## 2019-04-05 RX ADMIN — IOPAMIDOL 115 ML: 755 INJECTION, SOLUTION INTRAVENOUS at 15:34

## 2019-04-05 RX ADMIN — POLYMYXIN B SULFATE AND TRIMETHOPRIM 1 DROP: 10000; 1 SOLUTION OPHTHALMIC at 19:41

## 2019-04-05 RX ADMIN — FENTANYL CITRATE 50 MCG: 50 INJECTION, SOLUTION INTRAMUSCULAR; INTRAVENOUS at 15:06

## 2019-04-05 RX ADMIN — POLYMYXIN B SULFATE AND TRIMETHOPRIM 1 DROP: 10000; 1 SOLUTION OPHTHALMIC at 08:08

## 2019-04-05 RX ADMIN — SODIUM CHLORIDE: 9 INJECTION, SOLUTION INTRAVENOUS at 16:15

## 2019-04-05 RX ADMIN — MIDAZOLAM 1 MG: 1 INJECTION INTRAMUSCULAR; INTRAVENOUS at 15:06

## 2019-04-05 RX ADMIN — ACETAMINOPHEN 650 MG: 325 TABLET, FILM COATED ORAL at 12:31

## 2019-04-05 RX ADMIN — ACETAMINOPHEN 650 MG: 325 TABLET, FILM COATED ORAL at 06:48

## 2019-04-05 RX ADMIN — MIDAZOLAM 1 MG: 1 INJECTION INTRAMUSCULAR; INTRAVENOUS at 14:59

## 2019-04-05 RX ADMIN — POLYMYXIN B SULFATE AND TRIMETHOPRIM 1 DROP: 10000; 1 SOLUTION OPHTHALMIC at 14:34

## 2019-04-05 RX ADMIN — SODIUM CHLORIDE: 9 INJECTION, SOLUTION INTRAVENOUS at 08:06

## 2019-04-05 RX ADMIN — MIDAZOLAM 1 MG: 1 INJECTION INTRAMUSCULAR; INTRAVENOUS at 15:10

## 2019-04-05 RX ADMIN — POLYMYXIN B SULFATE AND TRIMETHOPRIM 1 DROP: 10000; 1 SOLUTION OPHTHALMIC at 11:21

## 2019-04-05 RX ADMIN — ASPIRIN 81 MG 81 MG: 81 TABLET ORAL at 08:06

## 2019-04-05 RX ADMIN — FENTANYL CITRATE 50 MCG: 50 INJECTION, SOLUTION INTRAMUSCULAR; INTRAVENOUS at 14:59

## 2019-04-05 ASSESSMENT — PAIN DESCRIPTION - DESCRIPTORS: DESCRIPTORS: HEADACHE

## 2019-04-05 ASSESSMENT — ACTIVITIES OF DAILY LIVING (ADL): ADLS_ACUITY_SCORE: 12

## 2019-04-05 NOTE — PROGRESS NOTES
Consulted for possible aneurysm at the distal left A3 segment.  Pt is alert and oriented per note.  Recommend pt follow-up within 2 weeks at [facility] for aneurysm evaluation.       Recommend:    Outpatient follow-up at [facility].       Alondra Puentes [facility]  [facility]  [address] [place] [address].  [id number]  Tel. [phone]  Fax [phone]

## 2019-04-05 NOTE — PROGRESS NOTES
Full Code. A&O x time, place and person. All vitals stable. SR/SB. Lung sounds clear. Independent. Pain is 3/10 headache relief with tylenol. Voiding normal. Resting comfortably, CPAP overnight. Plan angiogram 4/5. Continue to monitor.

## 2019-04-05 NOTE — PROGRESS NOTES
SW:  D:  Spoke with patient and wife regarding observation status.  Patient and wife were very upset that no one has discussed observation status with them until now.  Patient was transferred from [facility] and was told that he was an inpatient there and was coming to [facility] as an inpatient.  Patient's wife asked if she could drive patient to [facility] in her car and she was told that patient was inpatient therefore it was not recommended and it was covered.  Patient and wife state that the nurse just told them now that patient was observation status and that he will be getting a big bill.  Reviewed patient's [facility] stay and patient was observation status there and he was given the obs brochure in the ED.  Patient states he was in distress with chest pain at [facility] and does not recall this.  Patient's wife is wondering why she was told patient was an inpatient.  Explained that it was likely a poor choice of words.  Patient's wife was wondering why she could not drive patient to [facility].  Explained that that was likely a MD decision and given patient was having chest pain that was the reason.  Patient's wife states that patient has been here since the 3rd and no one has spoken with them about observation status.  Apologized for this and explained what observation status was and that typically things are billed as outpatient services.  Apologized for the stress that patient has gone through and the concern for the bill.  Offered patient relations phone number as patient and wife are very unhappy.

## 2019-04-05 NOTE — PROGRESS NOTES
[facility]    Medicine Progress Note - Hospitalist Service       Date of Admission:  4/3/2019  Assessment & Plan   Ludin Lance is a pleasant 63-year-old gentleman with a history of obstructive sleep apnea, on CPAP, was a direct admission to [facility] from [facility] for Cardiology evaluation in the setting of intermittent chest pain with abnormal stress test.     1.  Chest pain with abnormal stress test.    The patient reports 4 episodes of chest pain over the past 3 weeks with both typical and atypical features.  These episodes have all occurred at rest and lasted approximately 1 hour.  He describes substernal chest pressure with radiation to the jaw with associated headache.  He reports symptoms actually improved with exertion.  Risk factors include obesity, EDUARDO.  Workup at [facility] notable for a negative troponin x3.  No evidence of dissection on aortic survey.  He underwent a stress echocardiogram which showed some ST depression in V3 and V4 with exercise, prompting transfer for Cardiology evaluation.  He is pain-free on admission.   - Cardiology consulted, appreciate assistance.   - Daily ASA started on admission at [facility].   - Nitroglycerin p.r.n.   - CT coronary angio was nondiagnostic due to patient's body habitus  Planning on coronary angiogram today    2.  Headaches, unruptured brain aneurysm.    The patient reports a headache associated with episodes of chest pain.  Blood pressure was notably elevated on admission to [facility].  CT angiogram of head and neck showed 3 mm left A-3 aneurysm, unruptured, along with multifocal areas of possible intracerebral stenosis versus artifact.  Noncontrast head CT without acute findings.  Hospitalist at [facility] discussed with the on-call Neurosurgery who recommended outpatient followup.   - Outpatient Neurosurgery followup to be arranged prior to discharge.   -Neurosurgery consulted during this hospitalization and  "recommended outpatient follow-up with [facility] neurosurgery in 2 weeks    3.  Elevated blood pressure.    No formal HTN history, no meds; elevated at [facility] ED - 140s/90s here  - may be situational - \"white coat\" or pain/stress induced  - PRN hydralazine.   - Continue to monitor   Blood pressure better controlled in the 130s today    4.  Obstructive sleep apnea.    Continue CPAP per home settings.     5.  Cough, rhinorrhea.    Suspect a viral upper respiratory tract infection.  CT of the chest without infiltrates and white count normal on 04/02.  He is afebrile with a benign lung exam and vital signs.   - Robitussin p.r.n.   - Consider antihistamines     6. Conjunctivitis, possibly bacterial  - reports getting them every time he is in the hospital  - will start antibiotic drops, continue rewetting     Diet: NPO for Medical/Clinical Reasons Except for: Meds    DVT Prophylaxis: Pneumatic Compression Devices  Phillips Catheter: not present  Code Status: Full Code      Disposition Plan   Expected discharge: Pending cardiac angiogram might be able to discharge later today     Entered: Lorena James MD 04/05/2019, 11:33 AM       The patient's care was discussed with the Bedside Nurse and Patient.    Lorena James MD  Hospitalist Service  [facility]    ______________________________________________________________________    Interval History    no f/c/n/v, chest pain, palptns, or SOB.  CT coronary angiogram was nondiagnostic so patient is undergoing coronary angiogram today    Data reviewed today: I reviewed all medications, new labs and imaging results over the last 24 hours. I personally reviewed no images or EKG's today.    Physical Exam   Vital Signs: Temp: 98.5  F (36.9  C) Temp src: Oral BP: 135/83 Pulse: 71 Heart Rate: 77 Resp: 16 SpO2: 93 % O2 Device: None (Room air)    Weight: 286 lbs 14.4 oz    Gen: NAD, pleasant  HEENT: Normocephalic, EOMI, MMM  Resp: no crackles or wheezes, no increased " work of resp  CV: S1S2 heard, reg rhythm, reg rate, no pitting pedal edema  Abdo: soft, nontender, nondistended, bowel sounds present  Ext: calves nontender, well perfused  Neuro: AAOx3, CN grossly intact, no facial asymmetry      Data   Recent Labs   Lab 04/04/19  0539 04/03/19  0000 04/02/19  1804 04/02/19  1404   WBC 7.1  --   --  5.8   HGB 13.1*  --   --  13.8   MCV 86  --   --  88   *  --   --  130*     --   --  139   POTASSIUM 4.1  --   --  4.5   CHLORIDE 107  --   --  106   CO2 27  --   --  27   BUN 18  --   --  17   CR 0.99  --   --  1.03   ANIONGAP 4  --   --  6   NI 8.8  --   --  9.0   GLC 99  --   --  91   ALBUMIN  --   --   --  4.0   PROTTOTAL  --   --   --  6.6*   BILITOTAL  --   --   --  0.6   ALKPHOS  --   --   --  106   ALT  --   --   --  25   AST  --   --   --  15   TROPI  --  <0.015 <0.015 <0.015     Recent Results (from the past 24 hour(s))   CT Angiogram coronary artery    Narrative    Procedure: CTA ANGIOGRAM CORONARY ARTERY   Examination Date: 4/4/2019 2:34 PM     Indication:  Equivocal stress test.     Clinical Information: CP,abnormal stress   Ordering Physician: Javier Leigh     Overall quality of the study: Nearly uninterpretable.     PROCEDURE:The patient was positioned in the scanner gantry and an IV  was started using an 18 gauge IV in the right antecubital fossa.   Utilizing 240 cc  Isovue 370, wasted 0 cc, multi-slice computed  tomography was performed with a Siemens Dual Source Flash scanner  without incident. Beta-blockers were required to optimize heart rate,  patient was given Metoprolol 100 mg Oral, Metoprolol 40 mg  IV. The  patient was given pre-medication of sublingual Nitrostat 0.4 mg prior  to scanning. Coronary artery calcium score was performed using the  Flash scanner protocol. CTA was performed in the spiral/spiral mode at  a heart rate of 79 bpm with 120 kVp. Images were reconstructed and  analyzed on a [employer] workstation. Scan protocol was optimized  to  minimize radiation exposure. The total radiation exposure including  calcium score was calculated to be 1733 DLP, and 24.26 mSv.    There is significant motion artifact noted on both scans.      Impression    IMPRESSION:    1. Total Agatston score 0    2. This is a technically poor quality scan in which the mid and distal  RCA is not well visualized. Portions of the mid circumflex are not  well visualized. The proximal right coronary artery, the majority of  the left anterior descending artery, and the proximal circumflex are  relatively well seen and show no evidence of obstructive coronary  disease.    3. The ascending aorta is mildly dilated at 4.2 x 3.9 cm     Please review Radiology report for incidental noncardiac findings  that will follow separately.        FINDINGS:    CORONARY CALCIUM SCORE: The total Agatston calcium score is 0    CORONARY CT ANGIOGRAPHY    DOMINANCE: Right dominant system.     LEFT MAIN: The left main arises normally from the left coronary cusp  and is widely patent without any stenosis or plaque.    LEFT ANTERIOR DESCENDING: Anterior descending artery and its major  diagonal branches are grossly patent without any significant stenosis  or plaque..      CIRCUMFLEX: Proximal circumflex is reasonably well seen and appears  grossly patent. The mid to distal circumflex is not well seen but is  likely grossly patent.    RIGHT CORONARY ARTERY: Proximal right coronary artery is grossly  patent and is well seen. The mid right coronary artery is  uninterpretable. The very distal portion of the right coronary artery  I suspect is grossly patent but it is difficult to exclude obstructive  plaque.    ADDITIONAL FINDINGS:     The ascending aorta is mildly dilated at 4.2 x 3.9 cm     Normal pulmonary venous anatomy with all four pulmonary veins draining  into the left atrium.      There is no left ventricular mass or thrombus.     Normal pericardial thickness. There is no pericardial  effusion.    The proximal pulmonary arteries are well opacified.    Please review Radiology report for incidental noncardiac findings that  will follow separately.    ANIKA CHAPMAN MD   Radiologist Consult For Cardiology    Narrative    RADIOLOGIST CONSULT FOR CARDIOLOGY 4/4/2019 2:34 PM    HISTORY: 63-year-old patient with chest pain and abnormal/difficult  cardiac stress test.    COMPARISON: None.     TECHNIQUE: Axial and coronal CTA images were obtained through the  heart before and after the uneventful  administration of Isovue 370  intravenous contrast given for a total of 240 mL. Please note that the  termination of protocol and contrast following administration  determined by cardiology service.    FINDINGS: Please note this report will focus on soft tissue findings.  Please see separate report for all cardiac and vascular findings.      Impression    IMPRESSION: No abnormally enlarged mediastinal lymph nodes. The  visible solid organs of the upper abdomen are unremarkable.  Mild-to-moderate osteophyte formation in the visible thoracic spine,  though no acute osseous abnormality. No pulmonary masses.    RASHARD THAYER MD

## 2019-04-05 NOTE — PROGRESS NOTES
"Pt. prefers to stay overnight post angio.wife says \"I feels uncomfortable taking care of him\". Notified MD James. MD called back and mentioned pt is on observation. When informed pt. about the observation status. Pt. And his wife said they are unaware of observation status and pt. Is upset stating he was in-patient in [facility] and transferred here and nobody informed him the observation status. Notified Charge nurse and Nilo. No changes in the pt. Status. Notified . SW is in pt.room.  "

## 2019-04-05 NOTE — PROGRESS NOTES
Pt received from cath lab at 0175.Right groin site clear dressing noted small dried blood.No hematoma.

## 2019-04-05 NOTE — PROGRESS NOTES
Cardiology Progress Note          Assessment and Plan:         1. Chest discomfort with non diagnostic stress echocardiogram/CTA    PLAN  - No significant obstructive disease on cor angio today  - OK to discharge tomorrow        Interval History:     Seen after angiogram. No significant CAD. No current CP.              Review of Systems:   As per subjective, otherwise 5 systems reviewed and negative.           Physical Exam:   Blood pressure (!) 162/92, pulse 78, temperature 98.8  F (37.1  C), temperature source Oral, resp. rate 20, weight 130.1 kg (286 lb 14.4 oz), SpO2 96 %.      Vital Sign Ranges  Temperature Temp  Av.6  F (37  C)  Min: 98  F (36.7  C)  Max: 99.1  F (37.3  C)   Blood pressure Systolic (24hrs), Av , Min:125 , Max:162        Diastolic (24hrs), Av, Min:63, Max:92      Pulse Pulse  Av.7  Min: 78  Max: 86   Respirations Resp  Av.2  Min: 16  Max: 20   Pulse oximetry SpO2  Av.4 %  Min: 93 %  Max: 96 %       No intake or output data in the 24 hours ending 19 1801    Constitutional: NAD  Skin: Warm and dry  Neck: No JVD  Lungs: CTA  Cardiovascular: RRR, no m/r/g  Abdomen: Soft, non tender.  Extremities and Back: No LE edema  Neurological: Nonfocal           Medications:     I have reviewed this patient's current medications.              Data:     Labs reviewed.             Chanell Leigh M.D.

## 2019-04-06 NOTE — PROVIDER NOTIFICATION
As per cardiology.Ok to discharge tomorrow since pt. had late Angio today. Card spoke with pt.But pt says he wants to go home tonight says he changed his mind.  MD James called back and is concerned about pt. might not qualify for In patient.MD James wanted to talk to Charge Nurse. Call defer to Charge nurse.   As per card if pt. Is off bed rest and doing well, he can go home tonight.

## 2019-04-06 NOTE — PLAN OF CARE
Pt. ambulated to the BR. Denies any pain. VSS on RA. CMS intact. Right groin site dressing dry and intact.No hematoma noted. Pt. Discharge instruction reviewed with pt. and his wife.Question answered. Pt verbalized understanding. Belonging sent with pt. Pt. Wheeled down on wheelchair accompanied by NA and wife driven home.

## 2019-04-08 NOTE — DISCHARGE SUMMARY
[facility]  Discharge Summary        Ludin Lance MRN# [id number]   YOB: 1955 Age: 63 year old     Date of Admission:  4/3/2019  Date of Discharge:  4/5/2019  Admitting Physician:  Chanell Leigh MD  Discharge Physician: Lorena James MD  Discharging Service: Hospitalist     Primary Provider: Castro Lomas  Primary Care Physician Phone Number: [phone]         Discharge Diagnoses/Problem Oriented Hospital Course (Providers):    Ludin Lance was admitted on 4/3/2019 by Chanell Leigh MD and I would refer you to their history and physical.  The following problems were addressed during his hospitalization:  Assessment & Plan   Ludin Lance is a pleasant 63-year-old gentleman with a history of obstructive sleep apnea, on CPAP, was a direct admission to [facility] from [facility] for Cardiology evaluation in the setting of intermittent chest pain with abnormal stress test.     1.  Chest pain with abnormal stress test.    The patient reports 4 episodes of chest pain over the past 3 weeks with both typical and atypical features.  These episodes have all occurred at rest and lasted approximately 1 hour.  He describes substernal chest pressure with radiation to the jaw with associated headache.  He reports symptoms actually improved with exertion.  Risk factors include obesity, EDUARDO.  Workup at [facility] notable for a negative troponin x3.  No evidence of dissection on aortic survey.  He underwent a stress echocardiogram which showed some ST depression in V3 and V4 with exercise, prompting transfer for Cardiology evaluation.  He is pain-free on admission.   - Cardiology consulted, appreciate assistance.   - Daily ASA started on admission at [facility].   - Nitroglycerin p.r.n.   - CT coronary angio was nondiagnostic due to patient's body habitus  Coronary angiogram showed nonobstructive CAD so OK with discharge per cardiology today    2.  Headaches,  "unruptured brain aneurysm.    The patient reports a headache associated with episodes of chest pain.  Blood pressure was notably elevated on admission to [facility].  CT angiogram of head and neck showed 3 mm left A-3 aneurysm, unruptured, along with multifocal areas of possible intracerebral stenosis versus artifact.  Noncontrast head CT without acute findings.  Hospitalist at [facility] discussed with the on-call Neurosurgery who recommended outpatient followup.   - Outpatient Neurosurgery followup to be arranged prior to discharge.   -Neurosurgery consulted during this hospitalization and recommended outpatient follow-up with [facility] neurosurgery in 2 weeks    3.  Elevated blood pressure.    No formal HTN history, no meds; elevated at [facility] ED - 140s/90s here  - may be situational - \"white coat\" or pain/stress induced  - PRN hydralazine.   - Continue to monitor   Blood pressure better controlled in the 130s today    4.  Obstructive sleep apnea.    Continue CPAP per home settings.     5.  Cough, rhinorrhea.    Suspect a viral upper respiratory tract infection.  CT of the chest without infiltrates and white count normal on 04/02.  He is afebrile with a benign lung exam and vital signs.   - Robitussin p.r.n.   - Consider antihistamines     6. Conjunctivitis, possibly bacterial  - reports getting them every time he is in the hospital  - will start antibiotic drops, continue rewetting     Diet: NPO for Medical/Clinical Reasons Except for: Meds    DVT Prophylaxis: Pneumatic Compression Devices  Phillips Catheter: not present  Code Status: Full Code      Disposition Plan   Home today        Code Status:      Full Code        Brief Hospital Stay Summary Sent Home With Patient in AVS:               Important Results:      See below        Pending Results:        Unresulted Labs Ordered in the Past 30 Days of this Admission     No orders found from 2/2/2019 to 4/4/2019.            Discharge Instructions " and Follow-Up:      Follow-up Appointments     Follow-up and recommended labs and tests       Neurosurgery Follow up. You will need to schedule this appointment 2   weeks:  [facility] Radiology  [phone]         Follow-up and recommended labs and tests       Follow up with primary care provider, Castro Lomas, within 7 days   for hospital follow- up.  No follow up labs or test are needed.               Discharge Disposition:      Discharged to home        Discharge Medications:        Discharge Medication List as of 4/5/2019  8:33 PM      START taking these medications    Details   trimethoprim-polymyxin b (POLYTRIM) 22804-8.1 UNIT/ML-% ophthalmic solution Place 2 drops into both eyes every 8 hours, Disp-1 Bottle, R-0, E-Prescribe         CONTINUE these medications which have NOT CHANGED    Details   acetaminophen (TYLENOL) 325 MG tablet Take 2 tablets (650 mg) by mouth every 4 hours as needed for mild pain, Disp-100 tablet, R-0, E-Prescribe      ORDER FOR DME Equipment being ordered: Respironics Auto CPAP pressure 7-12 .Historical               Allergies:         Allergies   Allergen Reactions     Nkda [No Known Drug Allergies]            Consultations This Hospital Stay:      Consultation during this admission received from cardiology               Discharge Time:      Greater than 30 minutes.        Image Results From This Hospital Stay (For Non-EPIC Providers):        Results for orders placed or performed during the hospital encounter of 04/03/19   CT Angiogram coronary artery    Narrative    Procedure: CTA ANGIOGRAM CORONARY ARTERY   Examination Date: 4/4/2019 2:34 PM     Indication:  Equivocal stress test.     Clinical Information: CP,abnormal stress   Ordering Physician: Javier Leigh     Overall quality of the study: Nearly uninterpretable.     PROCEDURE:The patient was positioned in the scanner gantry and an IV  was started using an 18 gauge IV in the right antecubital fossa.   Utilizing 240 cc  Isovue  370, wasted 0 cc, multi-slice computed  tomography was performed with a Siemens Dual Source Flash scanner  without incident. Beta-blockers were required to optimize heart rate,  patient was given Metoprolol 100 mg Oral, Metoprolol 40 mg  IV. The  patient was given pre-medication of sublingual Nitrostat 0.4 mg prior  to scanning. Coronary artery calcium score was performed using the  Flash scanner protocol. CTA was performed in the spiral/spiral mode at  a heart rate of 79 bpm with 120 kVp. Images were reconstructed and  analyzed on a [employer] workstation. Scan protocol was optimized to  minimize radiation exposure. The total radiation exposure including  calcium score was calculated to be 1733 DLP, and 24.26 mSv.    There is significant motion artifact noted on both scans.      Impression    IMPRESSION:    1. Total Agatston score 0    2. This is a technically poor quality scan in which the mid and distal  RCA is not well visualized. Portions of the mid circumflex are not  well visualized. The proximal right coronary artery, the majority of  the left anterior descending artery, and the proximal circumflex are  relatively well seen and show no evidence of obstructive coronary  disease.    3. The ascending aorta is mildly dilated at 4.2 x 3.9 cm     Please review Radiology report for incidental noncardiac findings  that will follow separately.        FINDINGS:    CORONARY CALCIUM SCORE: The total Agatston calcium score is 0    CORONARY CT ANGIOGRAPHY    DOMINANCE: Right dominant system.     LEFT MAIN: The left main arises normally from the left coronary cusp  and is widely patent without any stenosis or plaque.    LEFT ANTERIOR DESCENDING: Anterior descending artery and its major  diagonal branches are grossly patent without any significant stenosis  or plaque..      CIRCUMFLEX: Proximal circumflex is reasonably well seen and appears  grossly patent. The mid to distal circumflex is not well seen but is  likely grossly  patent.    RIGHT CORONARY ARTERY: Proximal right coronary artery is grossly  patent and is well seen. The mid right coronary artery is  uninterpretable. The very distal portion of the right coronary artery  I suspect is grossly patent but it is difficult to exclude obstructive  plaque.    ADDITIONAL FINDINGS:     The ascending aorta is mildly dilated at 4.2 x 3.9 cm     Normal pulmonary venous anatomy with all four pulmonary veins draining  into the left atrium.      There is no left ventricular mass or thrombus.     Normal pericardial thickness. There is no pericardial effusion.    The proximal pulmonary arteries are well opacified.    Please review Radiology report for incidental noncardiac findings that  will follow separately.    ANIKA CHAPMAN MD   Radiologist Consult For Cardiology    Narrative    RADIOLOGIST CONSULT FOR CARDIOLOGY 4/4/2019 2:34 PM    HISTORY: 63-year-old patient with chest pain and abnormal/difficult  cardiac stress test.    COMPARISON: None.     TECHNIQUE: Axial and coronal CTA images were obtained through the  heart before and after the uneventful  administration of Isovue 370  intravenous contrast given for a total of 240 mL. Please note that the  termination of protocol and contrast following administration  determined by cardiology service.    FINDINGS: Please note this report will focus on soft tissue findings.  Please see separate report for all cardiac and vascular findings.      Impression    IMPRESSION: No abnormally enlarged mediastinal lymph nodes. The  visible solid organs of the upper abdomen are unremarkable.  Mild-to-moderate osteophyte formation in the visible thoracic spine,  though no acute osseous abnormality. No pulmonary masses.    RASHARD THAYER MD             Most Recent Lab Results In EPIC (For Non-EPIC Providers):    Most Recent 3 CBC's:  Recent Labs   Lab Test 04/04/19  0539 04/02/19  1404 07/12/18  0653 07/11/18  0632   WBC 7.1 5.8  --   --    HGB 13.1* 13.8  --   10.6*   MCV 86 88  --   --    * 130* 104*  --       Most Recent 3 BMP's:  Recent Labs   Lab Test 04/04/19  0539 04/02/19  1404 07/10/18  0542    139  --    POTASSIUM 4.1 4.5  --    CHLORIDE 107 106  --    CO2 27 27  --    BUN 18 17  --    CR 0.99 1.03 1.00   ANIONGAP 4 6  --    NI 8.8 9.0  --    GLC 99 91  --      Most Recent 3 Troponin's:  Recent Labs   Lab Test 04/03/19  0000 04/02/19  1804 04/02/19  1404   TROPI <0.015 <0.015 <0.015     Most Recent 3 INR's:No lab results found.  Most Recent 2 LFT's:  Recent Labs   Lab Test 04/02/19  1404   AST 15   ALT 25   ALKPHOS 106   BILITOTAL 0.6     Most Recent Cholesterol Panel:No lab results found.  Most Recent 6 Bacteria Isolates From Any Culture (See EPIC Reports for Culture Details):No lab results found.  Most Recent TSH, T4 and HgbA1c: No lab results found.

## 2019-04-09 ENCOUNTER — TELEPHONE (OUTPATIENT)
Dept: CARDIOLOGY | Facility: CLINIC | Age: 64
End: 2019-04-09

## 2019-04-09 DIAGNOSIS — R07.9 CHEST PAIN: Primary | ICD-10-CM

## 2019-04-09 LAB — INTERPRETATION ECG - MUSE: NORMAL

## 2019-04-09 NOTE — TELEPHONE ENCOUNTER
Patient was evaluated by cardiology after being transferred to [facility] from [facility]. Pt had been admitted the day prior to [facility] for HTN, chest pain. Troponins negative x 3, and stress test was non diagnostic so pt transferred to [facility]. 4/5/19: coronary angiogram showed normal coronary arteries. Incidental finding of intracerebral aneurysm found on CT-pt to f/u with OP neurology. Called patient to discuss any post hospital d/c questions he may have, review medication changes, and confirm f/u appts. Patient denied any questions regarding new medications or changes with their PTA medications. Patient denied any SOB, chest pain, or light headedness. RFA cardiac cath site is without bleeding, swelling, redness or tenderness. Denies fever. States has developed URI since hospitalized. RN confirmed with patient that he needs to be scheduled for f/u STACIE OV in 7-10 days. Order placed. Patient advised to call clinic with any cardiac related questions or concerns prior to this stacie't. Patient verbalized understanding and agreed with plan. RADHA Shay RN.

## 2019-04-29 ENCOUNTER — OFFICE VISIT (OUTPATIENT)
Dept: CARDIOLOGY | Facility: CLINIC | Age: 64
End: 2019-04-29
Attending: INTERNAL MEDICINE
Payer: COMMERCIAL

## 2019-04-29 VITALS
OXYGEN SATURATION: 97 % | HEART RATE: 66 BPM | WEIGHT: 289.4 LBS | DIASTOLIC BLOOD PRESSURE: 86 MMHG | BODY MASS INDEX: 41.52 KG/M2 | SYSTOLIC BLOOD PRESSURE: 134 MMHG

## 2019-04-29 DIAGNOSIS — I49.3 PVC'S (PREMATURE VENTRICULAR CONTRACTIONS): Primary | ICD-10-CM

## 2019-04-29 PROCEDURE — 99214 OFFICE O/P EST MOD 30 MIN: CPT | Performed by: NURSE PRACTITIONER

## 2019-04-29 SDOH — HEALTH STABILITY: MENTAL HEALTH: HOW MANY STANDARD DRINKS CONTAINING ALCOHOL DO YOU HAVE ON A TYPICAL DAY?: 1 OR 2

## 2019-04-29 SDOH — HEALTH STABILITY: MENTAL HEALTH: HOW OFTEN DO YOU HAVE 6 OR MORE DRINKS ON ONE OCCASION?: LESS THAN MONTHLY

## 2019-04-29 NOTE — PROGRESS NOTES
Cardiology Clinic Progress Note  Ludin Lance MRN# [id number]   YOB: 1955 Age: 63 year old     Reason For Visit: Hospital f/u   Primary Cardiologist:   Dr. Leigh           History of Presenting Illness:    Ludin Lance is a pleasant 63 year old patient with no past cardiac history. Past medical history significant for EDUARDO (CPAP compliant) and unruptured intracranial aneurysm.      Pt was seen in the ED for chest discomfort and found to have equivocal stress test. He was transferred to [facility] for further evaluation.  He was incidentally found to have an intracranial aneurysm on head CT (done for blurry vision) and follow-up with neurosurgery was recommended. CT coronary angiogram showed a calcium score of 0 but cannot visualize portions of the coronary circulation.  He ultimately underwent coronary angiogram on 4/5/2019 with normal coronary arteries. He was found to have frequent PVCs during stress testing and with angiogram. Stress echocardiogram showed normal LVEF 55-60%. He was also noted to have elevated blood pressures in the ER and during hospitalization they were better controlled at 130s.    Pt presents today for hospital follow-up. Since he was discharged, he has no further chest discomfort.  He denies any palpitations with his frequent PVCs.  He will be following up with neurosurgery at [facility] tomorrow.  Blood pressure is well controlled at 134/86.  He denies any problems with his angiogram site. Patient reports no chest pain, shortness of breath, PND, orthopnea, presyncope, syncope, edema, heart racing, or palpitations.    Current Cardiac Medications   None                    Assessment and Plan:     Plan  1.  48 hr holter monitor to assess PVC burden   2.  We will call with results of Holter monitor and if PVC burden is less than 15% he may follow up with cardiology p.r.n.       1. PVCs    EF 55-60% on stress echo without any significant structural abnormalities noted      Frequent PVCs on stress echo and angiogram     Asymptomatic          Thank you for allowing me to participate in this delightful patient's care.      This note was completed in part using Dragon voice recognition software. Although reviewed after completion, some word and grammatical errors may occur.    Makeda Barriga, APRN, CNP           Data:   All laboratory data reviewed        HPI and Plan:   See dictation    Orders Placed This Encounter   Procedures     Holter Monitor 48 hour Adult Pediatric       No orders of the defined types were placed in this encounter.      There are no discontinued medications.      Encounter Diagnosis   Name Primary?     PVC's (premature ventricular contractions) Yes       CURRENT MEDICATIONS:  Current Outpatient Medications   Medication Sig Dispense Refill     acetaminophen (TYLENOL) 325 MG tablet Take 2 tablets (650 mg) by mouth every 4 hours as needed for mild pain 100 tablet 0     trimethoprim-polymyxin b (POLYTRIM) 80151-1.1 UNIT/ML-% ophthalmic solution Place 2 drops into both eyes every 8 hours 1 Bottle 0     ORDER FOR DME Equipment being ordered: Respironics Auto CPAP pressure 7-12 .         ALLERGIES     Allergies   Allergen Reactions     Nkda [No Known Drug Allergies]        PAST MEDICAL HISTORY:  Past Medical History:   Diagnosis Date     Sleep apnea        PAST SURGICAL HISTORY:  Past Surgical History:   Procedure Laterality Date     ARTHROPLASTY HIP Right 7/9/2018    Procedure: ARTHROPLASTY HIP;  Right Total Hip Arthroplasty;  Surgeon: Ky Bartlett MD;  Location: [facility] OR     AS REMOVE TONSILS/ADENOIDS,<13 Y/O       C TOTAL KNEE ARTHROPLASTY Right 2010     CV HEART CATHETERIZATION WITH POSSIBLE INTERVENTION N/A 4/5/2019    Procedure: Heart Catheterization with possible Intervention;  Surgeon: Bobo Corado MD;  Location:  HEART CARDIAC CATH LAB     CV LEFT VENTRICULOGRAM N/A 4/5/2019    Procedure: Left Ventriculogram;  Surgeon: Mickie  Bobo BOYLE MD;  Location:  HEART CARDIAC CATH LAB       FAMILY HISTORY:  Family History   Problem Relation Age of Onset     Breast Cancer Mother      Heart Disease Father      Myocardial Infarction Father 59        MI ages 59, 72     Alcoholism Brother        SOCIAL HISTORY:  Social History     Socioeconomic History     Marital status:      Spouse name: None     Number of children: None     Years of education: None     Highest education level: None   Occupational History     None   Social Needs     Financial resource strain: None     Food insecurity:     Worry: None     Inability: None     Transportation needs:     Medical: None     Non-medical: None   Tobacco Use     Smoking status: Never Smoker     Smokeless tobacco: Never Used   Substance and Sexual Activity     Alcohol use: Yes     Alcohol/week: 1.2 oz     Types: 1 Glasses of wine, 1 Shots of liquor per week     Frequency: 2-4 times a month     Drinks per session: 1 or 2     Binge frequency: Less than monthly     Comment: A few beers per month     Drug use: No     Sexual activity: None   Lifestyle     Physical activity:     Days per week: None     Minutes per session: None     Stress: None   Relationships     Social connections:     Talks on phone: None     Gets together: None     Attends Synagogue service: None     Active member of club or organization: None     Attends meetings of clubs or organizations: None     Relationship status: None     Intimate partner violence:     Fear of current or ex partner: None     Emotionally abused: None     Physically abused: None     Forced sexual activity: None   Other Topics Concern     None   Social History Narrative    Is a pianist, 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        Review of Systems:  Skin:  Negative       Eyes:  Negative      ENT:  Negative      Respiratory:  Negative       Cardiovascular:  Negative      Gastroenterology: Negative      Genitourinary:  Negative      Musculoskeletal:  Negative       Neurologic:  Negative      Psychiatric:  Negative      Heme/Lymph/Imm:  Negative      Endocrine:  Negative        Physical Exam:  Vitals: /86 (BP Location: Right arm, Patient Position: Sitting, Cuff Size: Adult Regular)   Pulse 66   Wt 131.3 kg (289 lb 6.4 oz)   SpO2 97%   BMI 41.52 kg/m      Constitutional:  cooperative, alert and oriented, well developed, well nourished, in no acute distress obese      Skin:  warm and dry to the touch          Head:  normocephalic        Eyes:  sclera white        Lymph:      ENT:  no pallor or cyanosis        Neck:  no stiffness        Respiratory:  clear to auscultation;normal symmetry         Cardiac: regular rhythm;normal S1 and S2 occasional premature beats              pulses full and equal                                        GI:  abdomen soft        Extremities and Muscular Skeletal:  no edema              Neurological:  affect appropriate;no gross motor deficits        Psych:  Alert and Oriented x 3        CC  Chanell Leigh MD  [address]  [place] [id number]

## 2019-04-29 NOTE — PATIENT INSTRUCTIONS
"[facility] HEART CARE  [facility]~[address]~[place], [place]~[address]  Thank you for your [facility] visit today. If you have questions regarding your visit, please contact your cardiology RN's, Terri Alonso or Deidra Sandhu, at [phone]. Your provider has recommended the following:  Medication Changes:  No changes   Recommendations:  1. Call with any questions   Follow-up:  1. 48 hr holter monitor   2. We will call with results of heart monitor   To schedule a future appointment, we kindly ask that you call cardiology scheduling at [phone] three months prior to requested revisit date.      [facility] cardiology clinic is staffed with \"Advance Practice Providers\". These are our cardiology Physician Assistants and Nurse Practitioners.   Please call cardiology scheduling if you feel you need clinical evaluation with them at any time for any cardiac reason.   Reminder:  For your safety, we ask that you bring in your current medication(s) or an updated list of your medications with you to EACH office visit. Include the medication name, dose of pill on bottle and how you are taking it. Include over-the-counter medications or supplements. Your provider will review this at each visit and plan your care based on your current information.   ~~~~~~~~~~~~~~~~~~~~~~~~~~~~~~~~~~~~~~~  \"[facility]\" [place] telephone numbers for reference:  Cardiology Scheduling~[phone]  Diagnostic Imaging Scheduling~[phone]  Lab Scheduling~[phone]  Anticoagulation Clinic~[phone]  Cardiac Rehabilitation~[phone]  CORE Clinic RN's~[phone] (at [facility])  Cardiology Clinic RN's~[phone] (Deidra Sandhu, RN & Terri Alonso, RN)  ~~~~~~~~~~~~~~~~~~~~~~~~~~~~~~~~~~~~~~~~    "

## 2019-04-29 NOTE — LETTER
4/29/2019    Castro Lomas MD  [facility] [address] [address]    RE: Ludin Lance       Dear Colleague,    I had the pleasure of seeing Ludin Lance in the [facility].    Cardiology Clinic Progress Note  Ludin Lance MRN# [id number]   YOB: 1955 Age: 63 year old     Reason For Visit: Hospital f/u   Primary Cardiologist:   Dr. Leigh           History of Presenting Illness:    Ludin Lance is a pleasant 63 year old patient with no past cardiac history. Past medical history significant for EDUARDO (CPAP compliant) and unruptured intracranial aneurysm.      Pt was seen in the ED for chest discomfort and found to have equivocal stress test. He was transferred to [facility] for further evaluation.  He was incidentally found to have an intracranial aneurysm on head CT (done for blurry vision) and follow-up with neurosurgery was recommended. CT coronary angiogram showed a calcium score of 0 but cannot visualize portions of the coronary circulation.  He ultimately underwent coronary angiogram on 4/5/2019 with normal coronary arteries. He was found to have frequent PVCs during stress testing and with angiogram. Stress echocardiogram showed normal LVEF 55-60%. He was also noted to have elevated blood pressures in the ER and during hospitalization they were better controlled at 130s.    Pt presents today for hospital follow-up. Since he was discharged, he has no further chest discomfort.  He denies any palpitations with his frequent PVCs.  He will be following up with neurosurgery at [facility] tomorrow.  Blood pressure is well controlled at 134/86.  He denies any problems with his angiogram site. Patient reports no chest pain, shortness of breath, PND, orthopnea, presyncope, syncope, edema, heart racing, or palpitations.    Current Cardiac Medications   None                    Assessment and Plan:     Plan  1.  48 hr holter  monitor to assess PVC burden   2.  We will call with results of Holter monitor and if PVC burden is less than 15% he may follow up with cardiology p.r.n.       1. PVCs    EF 55-60% on stress echo without any significant structural abnormalities noted     Frequent PVCs on stress echo and angiogram     Asymptomatic          Thank you for allowing me to participate in this delightful patient's care.      This note was completed in part using Dragon voice recognition software. Although reviewed after completion, some word and grammatical errors may occur.    Makeda Barriga, APRN, CNP           Data:   All laboratory data reviewed        HPI and Plan:   See dictation    Orders Placed This Encounter   Procedures     Holter Monitor 48 hour Adult Pediatric       No orders of the defined types were placed in this encounter.      There are no discontinued medications.      Encounter Diagnosis   Name Primary?     PVC's (premature ventricular contractions) Yes       CURRENT MEDICATIONS:  Current Outpatient Medications   Medication Sig Dispense Refill     acetaminophen (TYLENOL) 325 MG tablet Take 2 tablets (650 mg) by mouth every 4 hours as needed for mild pain 100 tablet 0     trimethoprim-polymyxin b (POLYTRIM) 59628-6.1 UNIT/ML-% ophthalmic solution Place 2 drops into both eyes every 8 hours 1 Bottle 0     ORDER FOR DME Equipment being ordered: Respironics Auto CPAP pressure 7-12 .         ALLERGIES     Allergies   Allergen Reactions     Nkda [No Known Drug Allergies]        PAST MEDICAL HISTORY:  Past Medical History:   Diagnosis Date     Sleep apnea        PAST SURGICAL HISTORY:  Past Surgical History:   Procedure Laterality Date     ARTHROPLASTY HIP Right 7/9/2018    Procedure: ARTHROPLASTY HIP;  Right Total Hip Arthroplasty;  Surgeon: Ky Bartlett MD;  Location: [place] OR     AS REMOVE TONSILS/ADENOIDS,<13 Y/O       C TOTAL KNEE ARTHROPLASTY Right 2010     CV HEART CATHETERIZATION WITH POSSIBLE  INTERVENTION N/A 4/5/2019    Procedure: Heart Catheterization with possible Intervention;  Surgeon: Bobo Corado MD;  Location:  HEART CARDIAC CATH LAB     CV LEFT VENTRICULOGRAM N/A 4/5/2019    Procedure: Left Ventriculogram;  Surgeon: Bobo Corado MD;  Location:  HEART CARDIAC CATH LAB       FAMILY HISTORY:  Family History   Problem Relation Age of Onset     Breast Cancer Mother      Heart Disease Father      Myocardial Infarction Father 59        MI ages 59, 72     Alcoholism Brother        SOCIAL HISTORY:  Social History     Socioeconomic History     Marital status:      Spouse name: None     Number of children: None     Years of education: None     Highest education level: None   Occupational History     None   Social Needs     Financial resource strain: None     Food insecurity:     Worry: None     Inability: None     Transportation needs:     Medical: None     Non-medical: None   Tobacco Use     Smoking status: Never Smoker     Smokeless tobacco: Never Used   Substance and Sexual Activity     Alcohol use: Yes     Alcohol/week: 1.2 oz     Types: 1 Glasses of wine, 1 Shots of liquor per week     Frequency: 2-4 times a month     Drinks per session: 1 or 2     Binge frequency: Less than monthly     Comment: A few beers per month     Drug use: No     Sexual activity: None   Lifestyle     Physical activity:     Days per week: None     Minutes per session: None     Stress: None   Relationships     Social connections:     Talks on phone: None     Gets together: None     Attends Hoah[employer]ism service: None     Active member of club or organization: None     Attends meetings of clubs or organizations: None     Relationship status: None     Intimate partner violence:     Fear of current or ex partner: None     Emotionally abused: None     Physically abused: None     Forced sexual activity: None   Other Topics Concern     None   Social History Narrative    Is a pianist, 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        Review of Systems:  Skin:  Negative       Eyes:  Negative      ENT:  Negative      Respiratory:  Negative       Cardiovascular:  Negative      Gastroenterology: Negative      Genitourinary:  Negative      Musculoskeletal:  Negative      Neurologic:  Negative      Psychiatric:  Negative      Heme/Lymph/Imm:  Negative      Endocrine:  Negative        Physical Exam:  Vitals: /86 (BP Location: Right arm, Patient Position: Sitting, Cuff Size: Adult Regular)   Pulse 66   Wt 131.3 kg (289 lb 6.4 oz)   SpO2 97%   BMI 41.52 kg/m       Constitutional:  cooperative, alert and oriented, well developed, well nourished, in no acute distress obese      Skin:  warm and dry to the touch          Head:  normocephalic        Eyes:  sclera white        Lymph:      ENT:  no pallor or cyanosis        Neck:  no stiffness        Respiratory:  clear to auscultation;normal symmetry         Cardiac: regular rhythm;normal S1 and S2 occasional premature beats              pulses full and equal                                        GI:  abdomen soft        Extremities and Muscular Skeletal:  no edema              Neurological:  affect appropriate;no gross motor deficits        Psych:  Alert and Oriented x 3        CC  Chanell Leigh MD  [address]  [place] [address]                Thank you for allowing me to participate in the care of your patient.      Sincerely,     LEE Vazquez [facility]

## 2019-05-01 ENCOUNTER — HOSPITAL ENCOUNTER (OUTPATIENT)
Dept: CARDIOLOGY | Facility: CLINIC | Age: 64
Discharge: HOME | End: 2019-05-01
Attending: NURSE PRACTITIONER | Admitting: NURSE PRACTITIONER
Payer: COMMERCIAL

## 2019-05-01 DIAGNOSIS — I49.3 PVC'S (PREMATURE VENTRICULAR CONTRACTIONS): ICD-10-CM

## 2019-05-01 PROCEDURE — 93226 XTRNL ECG REC<48 HR SCAN A/R: CPT | Mod: 52

## 2019-05-01 PROCEDURE — 93227 XTRNL ECG REC<48 HR R&I: CPT | Performed by: INTERNAL MEDICINE

## 2019-05-07 ENCOUNTER — TELEPHONE (OUTPATIENT)
Dept: CARDIOLOGY | Facility: CLINIC | Age: 64
End: 2019-05-07

## 2019-05-07 DIAGNOSIS — I49.3 FREQUENT PVCS: Primary | ICD-10-CM

## 2019-05-07 NOTE — TELEPHONE ENCOUNTER
Makeda Antonio, LEE CNP  [facility] Care Team Pool             1. monitored for 48:00 hours rhythm was sinus with 1st degr ee AV block and frequent   PVCs. Average HR 86 bpm, maximum  bpm, minimum HR 55 bpm.   2. There were 71825 ventricular ectopics, which is 22% of total beats.   3. Rare PACs.   4. Patient event correlated with sinus with frequent PVCs and a HR of 80 bpm.       Let's see if he is agreeable to starting metoprolol XL 25 mg daily for PVCs. Then, recheck 24 hr holter at least 1 week after starting metoprolol. Then f/u with JEANETTE in 1 month.      I called this patient with result/recommendation. He would like to research this medication before agreeing to start. We discussed fatigue as most common side effect, and that it would suppress PVC's, lower heart rate as well as decrease the oxygen demand of the heart. He has requested a [employer] message with all information, which I will send. He is to contact me back with decision by the end of this week or early next week. Thais Alonso RN Cardiology at [facility] May 7, 2019, 8:26 AM     ADDENDUM:  Received [employer] message that he has discussed this with his PCP and feels this is a good idea. Rx sent to  [place] per pt request. Thais Alonso RN Cardiology at [facility] May 15, 2019, 7:51 AM

## 2019-05-15 RX ORDER — METOPROLOL SUCCINATE 25 MG/1
25 TABLET, EXTENDED RELEASE ORAL DAILY
Qty: 30 TABLET | Refills: 1 | Status: SHIPPED | OUTPATIENT
Start: 2019-05-15 | End: 2019-06-17

## 2019-05-29 ENCOUNTER — HOSPITAL ENCOUNTER (OUTPATIENT)
Dept: CARDIOLOGY | Facility: CLINIC | Age: 64
Discharge: HOME | End: 2019-05-29
Attending: NURSE PRACTITIONER | Admitting: NURSE PRACTITIONER
Payer: COMMERCIAL

## 2019-05-29 DIAGNOSIS — I49.3 FREQUENT PVCS: ICD-10-CM

## 2019-05-29 PROCEDURE — 93227 XTRNL ECG REC<48 HR R&I: CPT | Performed by: INTERNAL MEDICINE

## 2019-05-29 PROCEDURE — 93225 XTRNL ECG REC<48 HRS REC: CPT

## 2019-05-31 NOTE — RESULT ENCOUNTER NOTE
Results noted: principle rhythm sinu  with avg HR 68; 10,706 total VEBs (11% burden) [id number] isolated and 108 couplets; 30 isolated SVEs and 4 couplets; no events were logged during the recording period. To be discussed at OV with Makeda Antonio NP on 6/17/19

## 2019-06-17 ENCOUNTER — OFFICE VISIT (OUTPATIENT)
Dept: CARDIOLOGY | Facility: CLINIC | Age: 64
End: 2019-06-17
Attending: NURSE PRACTITIONER
Payer: COMMERCIAL

## 2019-06-17 ENCOUNTER — OFFICE VISIT (OUTPATIENT)
Dept: SLEEP MEDICINE | Facility: CLINIC | Age: 64
End: 2019-06-17
Payer: COMMERCIAL

## 2019-06-17 VITALS
DIASTOLIC BLOOD PRESSURE: 77 MMHG | OXYGEN SATURATION: 97 % | SYSTOLIC BLOOD PRESSURE: 132 MMHG | BODY MASS INDEX: 42.24 KG/M2 | WEIGHT: 294.4 LBS | HEART RATE: 70 BPM

## 2019-06-17 VITALS
HEART RATE: 73 BPM | DIASTOLIC BLOOD PRESSURE: 87 MMHG | BODY MASS INDEX: 42.09 KG/M2 | OXYGEN SATURATION: 97 % | SYSTOLIC BLOOD PRESSURE: 136 MMHG | HEIGHT: 70 IN | WEIGHT: 294 LBS

## 2019-06-17 DIAGNOSIS — E66.01 MORBID OBESITY (H): ICD-10-CM

## 2019-06-17 DIAGNOSIS — G47.33 OSA (OBSTRUCTIVE SLEEP APNEA): Primary | ICD-10-CM

## 2019-06-17 DIAGNOSIS — I49.3 FREQUENT PVCS: Primary | ICD-10-CM

## 2019-06-17 PROCEDURE — 99205 OFFICE O/P NEW HI 60 MIN: CPT | Performed by: FAMILY MEDICINE

## 2019-06-17 PROCEDURE — 99214 OFFICE O/P EST MOD 30 MIN: CPT | Performed by: NURSE PRACTITIONER

## 2019-06-17 RX ORDER — METOPROLOL SUCCINATE 25 MG/1
25 TABLET, EXTENDED RELEASE ORAL DAILY
Qty: 30 TABLET | Refills: 11 | Status: SHIPPED | OUTPATIENT
Start: 2019-06-17 | End: 2020-06-22

## 2019-06-17 ASSESSMENT — MIFFLIN-ST. JEOR: SCORE: 2129.83

## 2019-06-17 NOTE — PATIENT INSTRUCTIONS
"[facility] HEART CARE  [facility]~[address]~[place], [place]~[address]  Thank you for your  Heart Care visit today. If you have questions regarding your visit, please contact your cardiology RN's, Terri Alonso or Deidra Sandhu, at [phone]. Your provider has recommended the following:  Medication Changes:  No changes   Recommendations:  1. Call if you want to try diltiazem or see EP provider   Follow-up:  See Dr. Leigh for cardiology follow up at [facility]: Dec 2019  Call in Sept, to schedule the appointment.  To schedule a future appointment, we kindly ask that you call cardiology scheduling at [phone] three months prior to requested revisit date.      [facility] cardiology clinic is staffed with \"Advance Practice Providers\". These are our cardiology Physician Assistants and Nurse Practitioners.   Please call cardiology scheduling if you feel you need clinical evaluation with them at any time for any cardiac reason.   Reminder:  For your safety, we ask that you bring in your current medication(s) or an updated list of your medications with you to EACH office visit. Include the medication name, dose of pill on bottle and how you are taking it. Include over-the-counter medications or supplements. Your provider will review this at each visit and plan your care based on your current information.   ~~~~~~~~~~~~~~~~~~~~~~~~~~~~~~~~~~~~~~~  \"[facility]\" [place] telephone numbers for reference:  Cardiology Scheduling~[phone]  Diagnostic Imaging Scheduling~[phone]  Lab Scheduling~[phone]  Anticoagulation Clinic~[phone]  Cardiac Rehabilitation~[phone]  CORE Clinic RN's~[phone] (at [facility])  Cardiology Clinic RN's~[phone] (Deidra Sandhu, RN & Terri Alonso, RN)  ~~~~~~~~~~~~~~~~~~~~~~~~~~~~~~~~~~~~~~~~   "

## 2019-06-17 NOTE — LETTER
6/17/2019    Castro Lomas MD  [facility] [address] [facility] [address]    RE: Ludin Lance       Dear Colleague,    I had the pleasure of seeing Ludin Lance in the [facility].    Cardiology Clinic Progress Note  Ludin Lance MRN# [id number]   YOB: 1955 Age: 63 year old     Reason For Visit: PVC f/u   Primary Cardiologist:   Dr. Leigh           History of Presenting Illness:    Ludin Lance is a pleasant 63 year old patient with no past cardiac history. Past medical history significant for EDUARDO (CPAP compliant) and unruptured intracranial aneurysm.      Pt was seen in the ED for chest discomfort and found to have equivocal stress test. He was transferred to [facility] for further evaluation.  He was incidentally found to have an intracranial aneurysm on head CT (done for blurry vision) and follow-up with neurosurgery was recommended. CT coronary angiogram showed a calcium score of 0 but cannot visualize portions of the coronary circulation.  He ultimately underwent coronary angiogram on 4/5/2019 with normal coronary arteries. He was found to have frequent PVCs during stress testing and with angiogram. Stress echocardiogram showed normal LVEF 55-60%. He was also noted to have elevated blood pressures in the ER and during hospitalization they were better controlled at 130s.    Patient was last seen by me on 4/29/2019.  He had no further chest discomfort since discharge.  He denied palpitations with his frequent PVCs. 48 hour Holter monitor 5/1/2019 showed sinus rhythm with first-degree AV block, average heart rate 86, 22% PVC burden, rare PACs.  I recommended starting metoprolol XL 25 mg daily.       Pt presents today for PVC follow-up. Follow-up Holter monitor, after starting metoprolol, 5/29/2019 showing sinus rhythm with average heart rate 68, 11% PVC burden and occasional PACs, no events were reported. These results were  reviewed with him today.    Blood pressure and heart rate are well controlled. He has noticed some fatigue and trouble sleeping at night.  He has been waking up at 4 AM and taking about half an hour to go back to sleep.  He did follow up with sleep medicine today and they recommended no changes to his CPAP machine.  They recommended he try some Benadryl at night to help with sleeping.  He has also noted some weight gain about 10-12 pounds over the last month since starting metoprolol.  He believes this is also likely because he has been eating a snack with the metoprolol which he takes before bed.  Instead of switching the metoprolol, he prefers to try the Benadryl and not eating a snack before bed to help with weight gain.   If he continues with significant side effects, he will call the clinic and we can consider switching him to diltiazem. He remains asymptomatic with his PVCs. Patient reports no chest pain, shortness of breath, PND, orthopnea, presyncope, syncope, edema, heart racing, or palpitations.    Current Cardiac Medications   Metoprolol XL 25 mg daily                   Assessment and Plan:     Plan  1.   Continue current medications  2.  Call if not tolerating metoprolol and we will try switching to diltiazem.    3.  Follow-up with Dr. Leigh in six months       1. PVCs    EF 55-60% on stress echo 4/2019 without any significant structural abnormalities noted     Frequent PVCs on stress echo and angiogram     holter with 22% PVC burden decreased to 11% after starting metoprolol     Asymptomatic     Continue metoprolol    Ultimately, if not tolerating medication will send EP for consideration of ablation         Thank you for allowing me to participate in this delightful patient's care.      This note was completed in part using Dragon voice recognition software. Although reviewed after completion, some word and grammatical errors may occur.    Makeda Barriga, APRN, CNP           Data:   All  laboratory data reviewed        HPI and Plan:   See dictation    Orders Placed This Encounter   Procedures     Follow-Up with Cardiologist       Orders Placed This Encounter   Medications     metoprolol succinate ER (TOPROL-XL) 25 MG 24 hr tablet     Sig: Take 1 tablet (25 mg) by mouth daily     Dispense:  30 tablet     Refill:  11       Medications Discontinued During This Encounter   Medication Reason     metoprolol succinate ER (TOPROL-XL) 25 MG 24 hr tablet          Encounter Diagnosis   Name Primary?     Frequent PVCs Yes       CURRENT MEDICATIONS:  Current Outpatient Medications   Medication Sig Dispense Refill     acetaminophen (TYLENOL) 325 MG tablet Take 2 tablets (650 mg) by mouth every 4 hours as needed for mild pain 100 tablet 0     metoprolol succinate ER (TOPROL-XL) 25 MG 24 hr tablet Take 1 tablet (25 mg) by mouth daily 30 tablet 11     ORDER FOR DME Equipment being ordered: [employer] Auto CPAP pressure 7-12 .         ALLERGIES     Allergies   Allergen Reactions     Nkda [No Known Drug Allergies]        PAST MEDICAL HISTORY:  Past Medical History:   Diagnosis Date     Sleep apnea        PAST SURGICAL HISTORY:  Past Surgical History:   Procedure Laterality Date     ARTHROPLASTY HIP Right 7/9/2018    Procedure: ARTHROPLASTY HIP;  Right Total Hip Arthroplasty;  Surgeon: Ky Bartlett MD;  Location: [place] OR     AS REMOVE TONSILS/ADENOIDS,<11 Y/O       C TOTAL KNEE ARTHROPLASTY Right 2010     CV HEART CATHETERIZATION WITH POSSIBLE INTERVENTION N/A 4/5/2019    Procedure: Heart Catheterization with possible Intervention;  Surgeon: Bobo Corado MD;  Location: [facility] CARDIAC CATH LAB     CV LEFT VENTRICULOGRAM N/A 4/5/2019    Procedure: Left Ventriculogram;  Surgeon: Bobo Corado MD;  Location:  HEART CARDIAC CATH LAB       FAMILY HISTORY:  Family History   Problem Relation Age of Onset     Breast Cancer Mother      Heart Disease Father      Myocardial Infarction Father 59        MI  ages 59, 72     Alcoholism Brother        SOCIAL HISTORY:  Social History     Socioeconomic History     Marital status:      Spouse name: None     Number of children: None     Years of education: None     Highest education level: None   Occupational History     None   Social Needs     Financial resource strain: None     Food insecurity:     Worry: None     Inability: None     Transportation needs:     Medical: None     Non-medical: None   Tobacco Use     Smoking status: Never Smoker     Smokeless tobacco: Never Used   Substance and Sexual Activity     Alcohol use: Yes     Alcohol/week: 1.2 oz     Types: 1 Glasses of wine, 1 Shots of liquor per week     Frequency: 2-4 times a month     Drinks per session: 1 or 2     Binge frequency: Less than monthly     Comment: A few beers per month     Drug use: No     Sexual activity: None   Lifestyle     Physical activity:     Days per week: None     Minutes per session: None     Stress: None   Relationships     Social connections:     Talks on phone: None     Gets together: None     Attends Episcopal service: None     Active member of club or organization: None     Attends meetings of clubs or organizations: None     Relationship status: None     Intimate partner violence:     Fear of current or ex partner: None     Emotionally abused: None     Physically abused: None     Forced sexual activity: None   Other Topics Concern     None   Social History Narrative    Is a pianist, 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        Review of Systems:  Skin:  Negative       Eyes:  Negative      ENT:  Negative      Respiratory:  Negative       Cardiovascular:    Positive for;fatigue    Gastroenterology: Negative      Genitourinary:  Negative      Musculoskeletal:  Negative      Neurologic:  Negative      Psychiatric:  Positive for sleep disturbances    Heme/Lymph/Imm:  Negative      Endocrine:  Negative        Physical Exam:  Vitals: /77 (BP Location: Right arm, Patient Position:  Sitting, Cuff Size: Adult Regular)   Pulse 70   Wt 133.5 kg (294 lb 6.4 oz)   SpO2 97%   BMI 42.24 kg/m       Constitutional:  cooperative, alert and oriented, well developed, well nourished, in no acute distress obese      Skin:  warm and dry to the touch          Head:  normocephalic        Eyes:  sclera white        Lymph:      ENT:  no pallor or cyanosis        Neck:  no stiffness        Respiratory:  clear to auscultation;normal symmetry         Cardiac: regular rhythm;normal S1 and S2 occasional premature beats              pulses full and equal                                        GI:  abdomen soft        Extremities and Muscular Skeletal:      bilateral LE edema;trace          Neurological:  affect appropriate;no gross motor deficits        Psych:  Alert and Oriented x 3        CC  Chanell Leigh MD  [address]  [place] [address]              Thank you for allowing me to participate in the care of your patient.    Sincerely,     LEE Vazquez [facility]

## 2019-06-17 NOTE — PROGRESS NOTES
"  Sleep Consultation:    Date on this visit: 6/17/2019    Ludin Lance is sent by No ref. provider found for a sleep consultation regarding re-establishing care for EDUARDO and need for PAP supplies.    Primary Physician: Castro Lomas     Chief Complaint: \"I need a new mask, hose, head gear.\"    HPI:  Ludin Lance is a pleasant 64 yo M with history of severe EDUARDO treated with CPAP who presents to re-establish care for new supplies.    Pertinent PMHx of cerebral aneurysm, obesity, HTN.    Last visit on 1/15/2015 for CPAP compliance, doing incredibly well on CPAP auto-titrate 7-12 cm H2O.  A/P to continue CPAP on current settings.    Returns today in order to re-establish care for updated supplies and to discuss a travel CPAP.  Since our last visit, had hospitalization at [facility] 4/3/2019 - 4/5/2019 for CP with abnormal stress test.  Negative troponins x3, stress echo with ST depression in V3 / V4, transferred to [facility], had angiogram on 4/5/2019 with normal coronary arteries.  Frequent PVC's noted.  LVEF 55-60%.  Started on metoprolol for high PVC load (22%) on 48 hour holter monitor.  Denies any CP, SOB / JARRETT, palpitations at this time.    He feels he is getting use to the metoprolol.  Initially felt to have some low mood, fatigue, vivid dreams / nightmares.  But these seem to be improving.    He feels his CPAP continues to work very well.  His only sleep concern is that he previously slept well from 11pm - 7am with PRN aleve PM.  Now recommended to minimize NSAID use, so has had some early AM awakenings around 4am.    Reviewed CPAP download from past 30 days on auto-titrate 8-12 cm H2O.  Average usage 7 hours 24 minutes, AHI 1.5.    Ludin denies any sleep walking and dream enactment.      Allergies:    Allergies   Allergen Reactions     Nkda [No Known Drug Allergies]        Medications:    Current Outpatient Medications   Medication Sig Dispense Refill     acetaminophen (TYLENOL) 325 MG " tablet Take 2 tablets (650 mg) by mouth every 4 hours as needed for mild pain 100 tablet 0     metoprolol succinate ER (TOPROL-XL) 25 MG 24 hr tablet Take 1 tablet (25 mg) by mouth daily 30 tablet 1     ORDER FOR DME Equipment being ordered: Resp[employer]ics Auto CPAP pressure 7-12 .       trimethoprim-polymyxin b (POLYTRIM) 10121-7.1 UNIT/ML-% ophthalmic solution Place 2 drops into both eyes every 8 hours 1 Bottle 0       Problem List:  Patient Active Problem List    Diagnosis Date Noted     Chest pain 04/02/2019     Priority: Medium     Headache 04/02/2019     Priority: Medium     Hypertension 04/02/2019     Priority: Medium     Cerebral aneurysm without rupture 04/02/2019     Priority: Medium     Noted on CTA 4/2/2019 - 3mm of distal left A3 segment       Bacterial conjunctivitis of left eye 07/12/2018     Priority: Medium     Slow transit constipation 07/11/2018     Priority: Medium     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     Priority: Medium     EDUARDO (obstructive sleep apnea) 12/02/2014     Priority: Medium     Severe EDUARDO with significant desaturations (HST on 11/29/2014 with AHI 72.8, sanjay desat 69%)            Past Medical/Surgical History:  Past Medical History:   Diagnosis Date     Sleep apnea      Past Surgical History:   Procedure Laterality Date     ARTHROPLASTY HIP Right 7/9/2018    Procedure: ARTHROPLASTY HIP;  Right Total Hip Arthroplasty;  Surgeon: Ky Bartlett MD;  Location: [place] OR     AS REMOVE TONSILS/ADENOIDS,<13 Y/O       C TOTAL KNEE ARTHROPLASTY Right 2010     CV HEART CATHETERIZATION WITH POSSIBLE INTERVENTION N/A 4/5/2019    Procedure: Heart Catheterization with possible Intervention;  Surgeon: Bobo Corado MD;  Location:  HEART CARDIAC CATH LAB     CV LEFT VENTRICULOGRAM N/A 4/5/2019    Procedure: Left Ventriculogram;  Surgeon: Bobo Corado MD;  Location:  HEART CARDIAC CATH LAB       Social History:  Social History     Socioeconomic History     Marital status:      Spouse  name: Not on file     Number of children: Not on file     Years of education: Not on file     Highest education level: Not on file   Occupational History     Not on file   Social Needs     Financial resource strain: Not on file     Food insecurity:     Worry: Not on file     Inability: Not on file     Transportation needs:     Medical: Not on file     Non-medical: Not on file   Tobacco Use     Smoking status: Never Smoker     Smokeless tobacco: Never Used   Substance and Sexual Activity     Alcohol use: Yes     Alcohol/week: 1.2 oz     Types: 1 Glasses of wine, 1 Shots of liquor per week     Frequency: 2-4 times a month     Drinks per session: 1 or 2     Binge frequency: Less than monthly     Comment: A few beers per month     Drug use: No     Sexual activity: Not on file   Lifestyle     Physical activity:     Days per week: Not on file     Minutes per session: Not on file     Stress: Not on file   Relationships     Social connections:     Talks on phone: Not on file     Gets together: Not on file     Attends Buddhism service: Not on file     Active member of club or organization: Not on file     Attends meetings of clubs or organizations: Not on file     Relationship status: Not on file     Intimate partner violence:     Fear of current or ex partner: Not on file     Emotionally abused: Not on file     Physically abused: Not on file     Forced sexual activity: Not on file   Other Topics Concern     Not on file   Social History Narrative    Is a pianist, 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        Family History:  Family History   Problem Relation Age of Onset     Breast Cancer Mother      Heart Disease Father      Myocardial Infarction Father 59        MI ages 59, 72     Alcoholism Brother        Review of Systems:  A complete review of systems reviewed by me is negative with the exeption of what has been mentioned in the history of present illness.    Physical Examination:  Vitals: /87   Pulse 73   Ht 1.778  "m (5' 10\")   Wt 133.4 kg (294 lb)   SpO2 97%   BMI 42.18 kg/m    BMI= Body mass index is 42.18 kg/m .         [place] Total Score 1/15/2015   Total score - [place] 4            GENERAL APPEARANCE: healthy, alert, no distress, smiling and over weight  RESP: lungs clear to auscultation - no rales, rhonchi or wheezes  CV: regular rates and rhythm, normal S1 S2, no S3 or S4 and no murmur, click or rub  PSYCH: mentation appears normal and affect normal/bright    Impression/Plan:    1.)  Severe EDUARDO (HST on 11/29/2014 with AHI 72.8, sanjay desat 69%) treated with auto-titrate CPAP 8-12 cm H2O.   - Presenting concerns of snoring, observed apnea, and daytime sleepiness (ESS 14)   - Appears very well controlled per download and resolution of symptoms.     - Continue at CPAP at current settings.   - Appears he will be eligible for replacement CPAP 12/2/2019, reviewed travel CPAP options.   - Also discussed trial of diphenhydramine 25mg PO at bedtime PRN.    Over 50% of our time was spent in coordination and counseling of care for obstructive sleep apnea (EDUARDO).  We reviewed pathophysiology of EDUARDO, prior sleep testing if performed, indication for sleep testing, importance of treatment of significant EDUARDO to reduce long-term cardiovascular risk factors, importance of weight management, and proper care of equipment.    I have spent 60 minutes with this patient today in which greater than 50% of this time was spent in the counseling / coordination of care.    Manuel Young MD    CC: No ref. provider found        "

## 2019-06-17 NOTE — PROGRESS NOTES
Cardiology Clinic Progress Note  Ludin Lance MRN# [id number]   YOB: 1955 Age: 63 year old     Reason For Visit: PVC f/u   Primary Cardiologist:   Dr. Leigh           History of Presenting Illness:    Ludin Lance is a pleasant 63 year old patient with no past cardiac history. Past medical history significant for EDUARDO (CPAP compliant) and unruptured intracranial aneurysm.      Pt was seen in the ED for chest discomfort and found to have equivocal stress test. He was transferred to [facility] for further evaluation.  He was incidentally found to have an intracranial aneurysm on head CT (done for blurry vision) and follow-up with neurosurgery was recommended. CT coronary angiogram showed a calcium score of 0 but cannot visualize portions of the coronary circulation.  He ultimately underwent coronary angiogram on 4/5/2019 with normal coronary arteries. He was found to have frequent PVCs during stress testing and with angiogram. Stress echocardiogram showed normal LVEF 55-60%. He was also noted to have elevated blood pressures in the ER and during hospitalization they were better controlled at 130s.    Patient was last seen by me on 4/29/2019.  He had no further chest discomfort since discharge.  He denied palpitations with his frequent PVCs. 48 hour Holter monitor 5/1/2019 showed sinus rhythm with first-degree AV block, average heart rate 86, 22% PVC burden, rare PACs.  I recommended starting metoprolol XL 25 mg daily.       Pt presents today for PVC follow-up. Follow-up Holter monitor, after starting metoprolol, 5/29/2019 showing sinus rhythm with average heart rate 68, 11% PVC burden and occasional PACs, no events were reported. These results were reviewed with him today.    Blood pressure and heart rate are well controlled. He has noticed some fatigue and trouble sleeping at night.  He has been waking up at 4 AM and taking about half an hour to go back to sleep.  He did follow up with sleep  medicine today and they recommended no changes to his CPAP machine.  They recommended he try some Benadryl at night to help with sleeping.  He has also noted some weight gain about 10-12 pounds over the last month since starting metoprolol.  He believes this is also likely because he has been eating a snack with the metoprolol which he takes before bed.  Instead of switching the metoprolol, he prefers to try the Benadryl and not eating a snack before bed to help with weight gain.   If he continues with significant side effects, he will call the clinic and we can consider switching him to diltiazem. He remains asymptomatic with his PVCs. Patient reports no chest pain, shortness of breath, PND, orthopnea, presyncope, syncope, edema, heart racing, or palpitations.    Current Cardiac Medications   Metoprolol XL 25 mg daily                   Assessment and Plan:     Plan  1.   Continue current medications  2.  Call if not tolerating metoprolol and we will try switching to diltiazem.    3.  Follow-up with Dr. Leigh in six months       1. PVCs    EF 55-60% on stress echo 4/2019 without any significant structural abnormalities noted     Frequent PVCs on stress echo and angiogram     holter with 22% PVC burden decreased to 11% after starting metoprolol     Asymptomatic     Continue metoprolol    Ultimately, if not tolerating medication will send EP for consideration of ablation         Thank you for allowing me to participate in this delightful patient's care.      This note was completed in part using Dragon voice recognition software. Although reviewed after completion, some word and grammatical errors may occur.    Makeda Barriga, APRN, CNP           Data:   All laboratory data reviewed        HPI and Plan:   See dictation    Orders Placed This Encounter   Procedures     Follow-Up with Cardiologist       Orders Placed This Encounter   Medications     metoprolol succinate ER (TOPROL-XL) 25 MG 24 hr tablet      Sig: Take 1 tablet (25 mg) by mouth daily     Dispense:  30 tablet     Refill:  11       Medications Discontinued During This Encounter   Medication Reason     metoprolol succinate ER (TOPROL-XL) 25 MG 24 hr tablet          Encounter Diagnosis   Name Primary?     Frequent PVCs Yes       CURRENT MEDICATIONS:  Current Outpatient Medications   Medication Sig Dispense Refill     acetaminophen (TYLENOL) 325 MG tablet Take 2 tablets (650 mg) by mouth every 4 hours as needed for mild pain 100 tablet 0     metoprolol succinate ER (TOPROL-XL) 25 MG 24 hr tablet Take 1 tablet (25 mg) by mouth daily 30 tablet 11     ORDER FOR DME Equipment being ordered: Respironics Auto CPAP pressure 7-12 .         ALLERGIES     Allergies   Allergen Reactions     Nkda [No Known Drug Allergies]        PAST MEDICAL HISTORY:  Past Medical History:   Diagnosis Date     Sleep apnea        PAST SURGICAL HISTORY:  Past Surgical History:   Procedure Laterality Date     ARTHROPLASTY HIP Right 7/9/2018    Procedure: ARTHROPLASTY HIP;  Right Total Hip Arthroplasty;  Surgeon: Ky Bartlett MD;  Location: [facility] OR     AS REMOVE TONSILS/ADENOIDS,<13 Y/O       C TOTAL KNEE ARTHROPLASTY Right 2010     CV HEART CATHETERIZATION WITH POSSIBLE INTERVENTION N/A 4/5/2019    Procedure: Heart Catheterization with possible Intervention;  Surgeon: Bobo Corado MD;  Location:  HEART CARDIAC CATH LAB     CV LEFT VENTRICULOGRAM N/A 4/5/2019    Procedure: Left Ventriculogram;  Surgeon: Bobo Corado MD;  Location:  HEART CARDIAC CATH LAB       FAMILY HISTORY:  Family History   Problem Relation Age of Onset     Breast Cancer Mother      Heart Disease Father      Myocardial Infarction Father 59        MI ages 59, 72     Alcoholism Brother        SOCIAL HISTORY:  Social History     Socioeconomic History     Marital status:      Spouse name: None     Number of children: None     Years of education: None     Highest education level: None    Occupational History     None   Social Needs     Financial resource strain: None     Food insecurity:     Worry: None     Inability: None     Transportation needs:     Medical: None     Non-medical: None   Tobacco Use     Smoking status: Never Smoker     Smokeless tobacco: Never Used   Substance and Sexual Activity     Alcohol use: Yes     Alcohol/week: 1.2 oz     Types: 1 Glasses of wine, 1 Shots of liquor per week     Frequency: 2-4 times a month     Drinks per session: 1 or 2     Binge frequency: Less than monthly     Comment: A few beers per month     Drug use: No     Sexual activity: None   Lifestyle     Physical activity:     Days per week: None     Minutes per session: None     Stress: None   Relationships     Social connections:     Talks on phone: None     Gets together: None     Attends [employer] service: None     Active member of club or organization: None     Attends meetings of clubs or organizations: None     Relationship status: None     Intimate partner violence:     Fear of current or ex partner: None     Emotionally abused: None     Physically abused: None     Forced sexual activity: None   Other Topics Concern     None   Social History Narrative    Is a pianist, 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        Review of Systems:  Skin:  Negative       Eyes:  Negative      ENT:  Negative      Respiratory:  Negative       Cardiovascular:    Positive for;fatigue    Gastroenterology: Negative      Genitourinary:  Negative      Musculoskeletal:  Negative      Neurologic:  Negative      Psychiatric:  Positive for sleep disturbances    Heme/Lymph/Imm:  Negative      Endocrine:  Negative        Physical Exam:  Vitals: /77 (BP Location: Right arm, Patient Position: Sitting, Cuff Size: Adult Regular)   Pulse 70   Wt 133.5 kg (294 lb 6.4 oz)   SpO2 97%   BMI 42.24 kg/m      Constitutional:  cooperative, alert and oriented, well developed, well nourished, in no acute distress obese      Skin:  warm and  dry to the touch          Head:  normocephalic        Eyes:  sclera white        Lymph:      ENT:  no pallor or cyanosis        Neck:  no stiffness        Respiratory:  clear to auscultation;normal symmetry         Cardiac: regular rhythm;normal S1 and S2 occasional premature beats              pulses full and equal                                        GI:  abdomen soft        Extremities and Muscular Skeletal:      bilateral LE edema;trace          Neurological:  affect appropriate;no gross motor deficits        Psych:  Alert and Oriented x 3        CC  Bilal Yash Leigh MD  [address]  [place] [id number]

## 2019-06-17 NOTE — PATIENT INSTRUCTIONS
Here is a summary of our plan:    1.)  It looks like the pressures on your CPAP look correct, the EDUARDO is well controlled.  No changes needed here.    2.)  For the night time sleep aid, what would be a good replacement is generic Benadryl, called diphenhydramine, usually is 25mg at bed time.    3.)  You should be eligible to get the new travel CPAP covered by insurance on 12/2/2019.  Give us a call at [phone].  We looked at the Resmed Air Mini and the Respironics Go Auto.

## 2019-06-27 ENCOUNTER — HOSPITAL ENCOUNTER (EMERGENCY)
Facility: CLINIC | Age: 64
Discharge: HOME | End: 2019-06-28
Attending: EMERGENCY MEDICINE | Admitting: EMERGENCY MEDICINE
Payer: COMMERCIAL

## 2019-06-27 ENCOUNTER — APPOINTMENT (OUTPATIENT)
Dept: GENERAL RADIOLOGY | Facility: CLINIC | Age: 64
End: 2019-06-27
Attending: EMERGENCY MEDICINE
Payer: COMMERCIAL

## 2019-06-27 VITALS
HEART RATE: 91 BPM | OXYGEN SATURATION: 94 % | SYSTOLIC BLOOD PRESSURE: 142 MMHG | HEIGHT: 70 IN | DIASTOLIC BLOOD PRESSURE: 69 MMHG | TEMPERATURE: 97.8 F | RESPIRATION RATE: 18 BRPM | BODY MASS INDEX: 43.16 KG/M2 | WEIGHT: 301.5 LBS

## 2019-06-27 DIAGNOSIS — S43.004A SHOULDER DISLOCATION, RIGHT, INITIAL ENCOUNTER: ICD-10-CM

## 2019-06-27 PROCEDURE — 40000986 XR SHOULDER RT PORT G/E 2 VW: Mod: RT

## 2019-06-27 PROCEDURE — 73020 X-RAY EXAM OF SHOULDER: CPT | Mod: RT

## 2019-06-27 PROCEDURE — 23650 CLTX SHO DSLC W/MNPJ WO ANES: CPT | Mod: RT | Performed by: EMERGENCY MEDICINE

## 2019-06-27 PROCEDURE — 99284 EMERGENCY DEPT VISIT MOD MDM: CPT | Mod: 25 | Performed by: EMERGENCY MEDICINE

## 2019-06-27 ASSESSMENT — MIFFLIN-ST. JEOR: SCORE: 2158.85

## 2019-06-27 NOTE — ED AVS SNAPSHOT
[facility], [place], Emergency Department  [address] [facility] [address]  Phone:  [phone]                                    Ludin Lance   MRN: [id number]    Department:  [facility], Emergency Department   Date of Visit:  6/27/2019           After Visit Summary Signature Page    I have received my discharge instructions, and my questions have been answered. I have discussed any challenges I see with this plan with the nurse or doctor.    ..........................................................................................................................................  Patient/Patient Representative Signature      ..........................................................................................................................................  Patient Representative Print Name and Relationship to Patient    ..................................................               ................................................  Date                                   Time    ..........................................................................................................................................  Reviewed by Signature/Title    ...................................................              ..............................................  Date                                               Time          22EPIC Rev 08/18

## 2019-06-28 NOTE — ED TRIAGE NOTES
BIBA from home. Tripped and fell over his dog and landed on his right side. C/o right shoulder pain. Denies LOC. 100 mcg and 4 mg dilaudid given by EMS

## 2019-06-28 NOTE — ED PROVIDER NOTES
History   No chief complaint on file.    HPI  Ludin Lance is a 64 year old male who presents for evaluation of right shoulder pain. Patient reports he was letting his dog out the back door when his dog ran out past him, making him trip. He states he fell down 3 stairs and did hit his head but denies loss of consciousness or current headache. However, patient states he did hear a loud pop in his right shoulder and has had pain in that area since. Patient denies prior injury to this shoulder. He is not anticoagulated.    Past Medical History:   Diagnosis Date     Sleep apnea        Past Surgical History:   Procedure Laterality Date     ARTHROPLASTY HIP Right 7/9/2018    Procedure: ARTHROPLASTY HIP;  Right Total Hip Arthroplasty;  Surgeon: Ky Bartlett MD;  Location: [facility] OR     AS REMOVE TONSILS/ADENOIDS,<11 Y/O       C TOTAL KNEE ARTHROPLASTY Right 2010     CV HEART CATHETERIZATION WITH POSSIBLE INTERVENTION N/A 4/5/2019    Procedure: Heart Catheterization with possible Intervention;  Surgeon: Bobo Corado MD;  Location:  HEART CARDIAC CATH LAB     CV LEFT VENTRICULOGRAM N/A 4/5/2019    Procedure: Left Ventriculogram;  Surgeon: Bobo Corado MD;  Location:  HEART CARDIAC CATH LAB       Family History   Problem Relation Age of Onset     Breast Cancer Mother      Heart Disease Father      Myocardial Infarction Father 59        MI ages 59, 72     Alcoholism Brother        Social History     Tobacco Use     Smoking status: Never Smoker     Smokeless tobacco: Never Used   Substance Use Topics     Alcohol use: Yes     Alcohol/week: 1.2 oz     Types: 1 Glasses of wine, 1 Shots of liquor per week     Frequency: 2-4 times a month     Drinks per session: 1 or 2     Binge frequency: Less than monthly     Comment: A few beers per month       No current facility-administered medications for this encounter.      Current Outpatient Medications   Medication     acetaminophen (TYLENOL) 325 MG tablet      "metoprolol succinate ER (TOPROL-XL) 25 MG 24 hr tablet     ORDER FOR DME        Allergies   Allergen Reactions     Nkda [No Known Drug Allergies]      I have reviewed the Medications, Allergies, Past Medical and Surgical History, and Social History in the Epic system.    Review of Systems   Musculoskeletal:        Positive for right shoulder pain   All other systems reviewed and are negative.      Physical Exam   BP: (!) 80/54  Pulse: 88  Temp: 97.8  F (36.6  C)  Resp: 18  Height: 177.8 cm (5' 10\")  Weight: 136.8 kg (301 lb 8 oz)  SpO2: 94 %      Physical Exam   Constitutional: He is oriented to person, place, and time.   Comfortably resting, lying in bed, NAD, nondiaphoretic, lucid, fully conversant, no  respiratory distress, alert and oriented.     HENT:   Head: Normocephalic.   Small abrasion on right cheek with no bony tenderness.  There is no other trauma on head   Eyes: Pupils are equal, round, and reactive to light. EOM are normal.   Neck: Normal range of motion. Neck supple.   No pain or swelling in the neck. No swollen glands. No pain with motion of the neck.     Cardiovascular: Normal rate and intact distal pulses.   Pulmonary/Chest: Effort normal. No respiratory distress.   Abdominal: Soft. There is no tenderness. There is no rebound.   Musculoskeletal:        Right shoulder: He exhibits decreased range of motion, tenderness, deformity and pain. He exhibits no bony tenderness, no swelling and no effusion.   Neurological: He is alert and oriented to person, place, and time. GCS eye subscore is 4. GCS verbal subscore is 5. GCS motor subscore is 6.   Skin: Skin is warm and dry. Capillary refill takes less than 2 seconds.       ED Course        Orthopedic injury tx  Date/Time: 6/28/2019 12:52 AM  Performed by: Ruel Shetty MD  Authorized by: Ruel Shetty MD   Consent: Verbal consent obtained.  Consent given by: patient  Patient identity confirmed: verbally with patient  Injury location: " shoulder  Location details: right shoulder  Injury type: dislocation  Dislocation type: anterior  Hill-Sachs deformity: no  Chronicity: new  Pre-procedure neurovascular assessment: neurovascularly intact  Pre-procedure distal perfusion: normal  Pre-procedure neurological function: normal  Pre-procedure range of motion: reduced    Anesthesia:  Local anesthesia used: no    Sedation:  Patient sedated: no    Manipulation performed: yes  Reduction method: external rotation  Reduction successful: yes  X-ray confirmed reduction: yes  Immobilization: sling  Post-procedure neurovascular assessment: post-procedure neurovascularly intact  Post-procedure distal perfusion: normal  Post-procedure neurological function: normal  Post-procedure range of motion: normal  Patient tolerance: Patient tolerated the procedure well with no immediate complications             10:59 PM  The patient was seen and examined by Dr. Shetty in Room 22.          Critical Care time:  none             Labs Ordered and Resulted from Time of ED Arrival Up to the Time of Departure from the ED - No data to display         Assessments & Plan (with Medical Decision Making)   This is a 64-year-old male coming into the emergency room with right shoulder pain after a fall down some steps this evening.  Patient had no other significant injuries and denied any other complaints other than her right shoulder.  On physical exam it appears that he has a dislocation.  A portable x-ray confirmed the finding with no additional fractures.  Simple external rotation of the dislocated shoulder provided a successful reduction.  There is no complications in the procedure and the patient had full range of motion with no neuro or circulatory deficits.  Patient will be placed in a sling and instructed to follow-up with his primary care physician.    Pt was discharged home/self-care.  PT was provided written discharge instructions. Additionally verbal instructions were given and  "discussed with patient.  PT was asked to return to the ED immediately for any new or concerning symptoms.  Pt was in agreement, endorsed understanding, and questions were answered.     I have reviewed the nursing notes.    I have reviewed the findings, diagnosis, plan and need for follow up with the patient.       Medication List      There are no discharge medications for this visit.         Final diagnoses:   Shoulder dislocation, right, initial encounter   I, Zeyad Dinero, am serving as a trained medical scribe to document services personally performed by Cirilo Shetty MD, based on the provider's statements to me.   I, Cirilo Shetty MD, was physically present and have reviewed and verified the accuracy of this note documented by Zeyad Dinero.    \"This dictation was performed with the assistance of voice recognition software and may contain inadvertant transcription  errors,  omissions and/or  inadvertent word substitution.\" --Ruel Shetty MD       6/27/2019   [facility], [place], EMERGENCY DEPARTMENT     Ruel Shetty MD  06/28/19 0100    "

## 2019-06-28 NOTE — ED NOTES
Bed: ED22  Expected date: 6/27/19  Expected time:   Means of arrival:   Comments:  [place] [address]  64 y M  Fall, R shoulder injury  [place]

## 2019-06-28 NOTE — DISCHARGE INSTRUCTIONS
Please make an appointment to follow up with Your Primary Care Provider as soon as possible for follow up.

## 2019-07-26 ENCOUNTER — ANESTHESIA EVENT (OUTPATIENT)
Dept: SURGERY | Facility: CLINIC | Age: 64
End: 2019-07-26
Payer: COMMERCIAL

## 2019-07-26 ASSESSMENT — ENCOUNTER SYMPTOMS: DYSRHYTHMIAS: 1

## 2019-07-26 NOTE — ANESTHESIA PREPROCEDURE EVALUATION
Anesthesia Pre-Procedure Evaluation    Patient: Ludin Lance   MRN: [id number] : 1955          Preoperative Diagnosis: Right shoulder arthritis    Procedure(s):  Distal clavicle excision,subacromial decompression, rotator cuff repair.    Past Medical History:   Diagnosis Date     Sleep apnea      Past Surgical History:   Procedure Laterality Date     ARTHROPLASTY HIP Right 2018    Procedure: ARTHROPLASTY HIP;  Right Total Hip Arthroplasty;  Surgeon: Ky Bartlett MD;  Location: [facility]     AS REMOVE TONSILS/ADENOIDS,<12 Y/O       C TOTAL KNEE ARTHROPLASTY Right      CV HEART CATHETERIZATION WITH POSSIBLE INTERVENTION N/A 2019    Procedure: Heart Catheterization with possible Intervention;  Surgeon: Bobo Corado MD;  Location:  HE[facility] CARDI[facility] CATH LAB     CV LEFT VENTRICULOGRAM N/A 2019    Procedure: Left Ventriculogram;  Surgeon: Bobo Corado MD;  Location:  HEART CARDIAC CATH LAB       Anesthesia Evaluation     . Pt has had prior anesthetic. Type: General, MAC and Regional           ROS/MED HX    ENT/Pulmonary:     (+)sleep apnea, , . .    Neurologic:  - neg neurologic ROS     Cardiovascular: Comment: Cerebral aneurysm without rupture    (+) Dyslipidemia, hypertension--CAD, angina (Hxof)--. : . . . :. dysrhythmias (Frequent PVCs) Other, . Previous cardiac testing date:results:Stress Testdate:4/3/19 results:Interpretation Summary  The baseline electrocardiogram was abnormal. It displayed right bundle branch  block.  Frequent premature ventricular contractions  Target Heart Rate was achieved.  The patient developed 2 mm of ST depression in lead V3 and 1 mm in lead V4  with exercise.  Arrhythmia induced during stress: frequent PVC's.  Normal left ventricular function and wall motion at rest and post-stress.     This was a difficult stress echo and the post stress lateral wall is not well  seen. Given his ECG changes I would recommend a Lexiscan nulcear stress test  or  cardiac CT angiogram.     Technically difficult, suboptimal study.  _____________________________________________________________________________  __     Stress  The patient exercised 6:00.  There was a normal BP response to exercise.  A treadmill exercise test according to the Jean protocol was performed.  Target Heart Rate was achieved.  Arrhythmia induced during stress: frequent PVC's.  This was an abnormal stress EKG.  The patient developed 2 mm of ST depression in lead V3 and 1 mm in lead V4  with exercise.  The visual ejection fraction is estimated at 65-70%.  Normal left ventricular function and wall motion at rest and post-stress.     Baseline  The patient is in normal sinus rhythm.  The baseline electrocardiogram was abnormal. It displayed right bundle branch  block.  Frequent premature ventricular contractions.  The visual ejection fraction is estimated at 55-60%.     Stress Results         Protocol:  Jean Protocol        Maximum Predicted HR:   156 bpm         Target HR: 133 bpm               % Maximum Predicted HR: 104 %            Stage  DurationHeart Rate  BP                Comment                 (mm:ss)   (bpm)        Stage 1   3:00      129   138/78          Peak    3:00      162   158/78Term-fatigue; FAC-below; [id number]       RecoveryR  6:00      106   146/78               Stress Duration:   6:00 mm:ss *        Recovery Time: 6:00 mm:ss         Maximum Stress HR: 162 bpm *           METS:          7     Left Ventricle  The visual ejection fraction is estimated at 55-60%.   ECG reviewed date:4/5/19 results:Sinus rhythm with occasional Premature ventricular complexes  Right bundle branch block  Abnormal ECG  No previous ECGs available date: results:          METS/Exercise Tolerance:  4 - Raking leaves, gardening   Hematologic:  - neg hematologic  ROS       Musculoskeletal:   (+) arthritis,  -       GI/Hepatic:  - neg GI/hepatic ROS       Renal/Genitourinary:  - ROS Renal section negative      "  Endo:     (+) Obesity (Morbid), .      Psychiatric:  - neg psychiatric ROS       Infectious Disease:  - neg infectious disease ROS       Malignancy:         Other:    - neg other ROS                      Physical Exam  Normal systems: cardiovascular, pulmonary and dental    Airway   Mallampati: III  TM distance: >3 FB  Neck ROM: full    Dental     Cardiovascular       Pulmonary             Lab Results   Component Value Date    WBC 7.1 04/04/2019    HGB 13.1 (L) 04/04/2019    HCT 38.0 (L) 04/04/2019     (L) 04/04/2019     04/04/2019    POTASSIUM 4.1 04/04/2019    CHLORIDE 107 04/04/2019    CO2 27 04/04/2019    BUN 18 04/04/2019    CR 0.99 04/04/2019    GLC 99 04/04/2019    NI 8.8 04/04/2019    ALBUMIN 4.0 04/02/2019    PROTTOTAL 6.6 (L) 04/02/2019    ALT 25 04/02/2019    AST 15 04/02/2019    ALKPHOS 106 04/02/2019    BILITOTAL 0.6 04/02/2019       Preop Vitals  BP Readings from Last 3 Encounters:   06/27/19 142/69   06/17/19 132/77   06/17/19 136/87    Pulse Readings from Last 3 Encounters:   06/27/19 91   06/17/19 70   06/17/19 73      Resp Readings from Last 3 Encounters:   06/27/19 18   04/05/19 20   04/03/19 18    SpO2 Readings from Last 3 Encounters:   06/27/19 94%   06/17/19 97%   06/17/19 97%      Temp Readings from Last 1 Encounters:   06/27/19 36.6  C (97.8  F) (Oral)    Ht Readings from Last 1 Encounters:   06/27/19 1.778 m (5' 10\")      Wt Readings from Last 1 Encounters:   06/27/19 136.8 kg (301 lb 8 oz)    Estimated body mass index is 43.26 kg/m  as calculated from the following:    Height as of 6/27/19: 1.778 m (5' 10\").    Weight as of 6/27/19: 136.8 kg (301 lb 8 oz).       Anesthesia Plan      History & Physical Review      ASA Status:  3 .    NPO Status:  > 8 hours    Plan for General, ETT and Periph. Nerve Block for postop pain with Propofol induction. Maintenance will be Balanced.    PONV prophylaxis:  Ondansetron (or other 5HT-3) and Dexamethasone or Solumedrol  Additional " equipment: Videolaryngoscope The History and Physical has been reviewed, the patient has been examined and no changes have occurred in the patient's condition since the H & P was completed.         Postoperative Care  Postoperative pain management:  IV analgesics, Oral pain medications and Peripheral nerve block (Single Shot).      Consents  Anesthetic plan, risks, benefits and alternatives discussed with:  Patient..                 LEE Martinez CRNA

## 2019-07-29 ENCOUNTER — HOSPITAL ENCOUNTER (OUTPATIENT)
Facility: CLINIC | Age: 64
Discharge: HOME | End: 2019-07-29
Attending: ORTHOPAEDIC SURGERY | Admitting: ORTHOPAEDIC SURGERY
Payer: COMMERCIAL

## 2019-07-29 ENCOUNTER — ANESTHESIA (OUTPATIENT)
Dept: SURGERY | Facility: CLINIC | Age: 64
End: 2019-07-29
Payer: COMMERCIAL

## 2019-07-29 VITALS
TEMPERATURE: 97.8 F | RESPIRATION RATE: 16 BRPM | HEART RATE: 89 BPM | DIASTOLIC BLOOD PRESSURE: 86 MMHG | OXYGEN SATURATION: 93 % | WEIGHT: 291 LBS | SYSTOLIC BLOOD PRESSURE: 123 MMHG | HEIGHT: 69 IN | BODY MASS INDEX: 43.1 KG/M2

## 2019-07-29 PROCEDURE — 25000128 H RX IP 250 OP 636: Performed by: NURSE ANESTHETIST, CERTIFIED REGISTERED

## 2019-07-29 PROCEDURE — 37000008 ZZH ANESTHESIA TECHNICAL FEE, 1ST 30 MIN: Performed by: ORTHOPAEDIC SURGERY

## 2019-07-29 PROCEDURE — 36000093 ZZH SURGERY LEVEL 4 1ST 30 MIN: Performed by: ORTHOPAEDIC SURGERY

## 2019-07-29 PROCEDURE — 27210794 ZZH OR GENERAL SUPPLY STERILE: Performed by: ORTHOPAEDIC SURGERY

## 2019-07-29 PROCEDURE — 40000306 ZZH STATISTIC PRE PROC ASSESS II: Performed by: ORTHOPAEDIC SURGERY

## 2019-07-29 PROCEDURE — 25000125 ZZHC RX 250: Performed by: NURSE ANESTHETIST, CERTIFIED REGISTERED

## 2019-07-29 PROCEDURE — 25000128 H RX IP 250 OP 636: Performed by: PHYSICIAN ASSISTANT

## 2019-07-29 PROCEDURE — 37000011 ZZH ANESTHESIA WARD SERVICE: Performed by: NURSE ANESTHETIST, CERTIFIED REGISTERED

## 2019-07-29 PROCEDURE — 25000566 ZZH SEVOFLURANE, EA 15 MIN: Performed by: ORTHOPAEDIC SURGERY

## 2019-07-29 PROCEDURE — 25800030 ZZH RX IP 258 OP 636: Performed by: NURSE ANESTHETIST, CERTIFIED REGISTERED

## 2019-07-29 PROCEDURE — 71000027 ZZH RECOVERY PHASE 2 EACH 15 MINS: Performed by: ORTHOPAEDIC SURGERY

## 2019-07-29 PROCEDURE — C1713 ANCHOR/SCREW BN/BN,TIS/BN: HCPCS | Performed by: ORTHOPAEDIC SURGERY

## 2019-07-29 PROCEDURE — 25000132 ZZH RX MED GY IP 250 OP 250 PS 637: Performed by: NURSE ANESTHETIST, CERTIFIED REGISTERED

## 2019-07-29 PROCEDURE — 71000014 ZZH RECOVERY PHASE 1 LEVEL 2 FIRST HR: Performed by: ORTHOPAEDIC SURGERY

## 2019-07-29 PROCEDURE — 37000009 ZZH ANESTHESIA TECHNICAL FEE, EACH ADDTL 15 MIN: Performed by: ORTHOPAEDIC SURGERY

## 2019-07-29 PROCEDURE — 36000063 ZZH SURGERY LEVEL 4 EA 15 ADDTL MIN: Performed by: ORTHOPAEDIC SURGERY

## 2019-07-29 DEVICE — IMP ANCHOR ARTHREX ACHILLIES SPEEDBRDG BIOCOMP AR-8928BC-CP: Type: IMPLANTABLE DEVICE | Site: SHOULDER | Status: FUNCTIONAL

## 2019-07-29 RX ORDER — PROPOFOL 10 MG/ML
INJECTION, EMULSION INTRAVENOUS PRN
Status: DISCONTINUED | OUTPATIENT
Start: 2019-07-29 | End: 2019-07-29

## 2019-07-29 RX ORDER — CEFAZOLIN SODIUM IN 0.9 % NACL 3 G/100 ML
3 INTRAVENOUS SOLUTION, PIGGYBACK (ML) INTRAVENOUS
Status: COMPLETED | OUTPATIENT
Start: 2019-07-29 | End: 2019-07-29

## 2019-07-29 RX ORDER — SODIUM CHLORIDE, SODIUM LACTATE, POTASSIUM CHLORIDE, CALCIUM CHLORIDE 600; 310; 30; 20 MG/100ML; MG/100ML; MG/100ML; MG/100ML
INJECTION, SOLUTION INTRAVENOUS CONTINUOUS
Status: DISCONTINUED | OUTPATIENT
Start: 2019-07-29 | End: 2019-07-29 | Stop reason: HOSPADM

## 2019-07-29 RX ORDER — CEFAZOLIN SODIUM 1 G/50ML
1 INJECTION, SOLUTION INTRAVENOUS SEE ADMIN INSTRUCTIONS
Status: DISCONTINUED | OUTPATIENT
Start: 2019-07-29 | End: 2019-07-29 | Stop reason: HOSPADM

## 2019-07-29 RX ORDER — GABAPENTIN 300 MG/1
300 CAPSULE ORAL ONCE
Status: COMPLETED | OUTPATIENT
Start: 2019-07-29 | End: 2019-07-29

## 2019-07-29 RX ORDER — ROPIVACAINE HYDROCHLORIDE 5 MG/ML
INJECTION, SOLUTION EPIDURAL; INFILTRATION; PERINEURAL PRN
Status: DISCONTINUED | OUTPATIENT
Start: 2019-07-29 | End: 2019-07-29

## 2019-07-29 RX ORDER — NALOXONE HYDROCHLORIDE 0.4 MG/ML
.1-.4 INJECTION, SOLUTION INTRAMUSCULAR; INTRAVENOUS; SUBCUTANEOUS
Status: DISCONTINUED | OUTPATIENT
Start: 2019-07-29 | End: 2019-07-29 | Stop reason: HOSPADM

## 2019-07-29 RX ORDER — MEPERIDINE HYDROCHLORIDE 25 MG/ML
12.5 INJECTION INTRAMUSCULAR; INTRAVENOUS; SUBCUTANEOUS
Status: DISCONTINUED | OUTPATIENT
Start: 2019-07-29 | End: 2019-07-29 | Stop reason: HOSPADM

## 2019-07-29 RX ORDER — ONDANSETRON 2 MG/ML
4 INJECTION INTRAMUSCULAR; INTRAVENOUS EVERY 30 MIN PRN
Status: DISCONTINUED | OUTPATIENT
Start: 2019-07-29 | End: 2019-07-29 | Stop reason: HOSPADM

## 2019-07-29 RX ORDER — ACETAMINOPHEN 325 MG/1
975 TABLET ORAL ONCE
Status: COMPLETED | OUTPATIENT
Start: 2019-07-29 | End: 2019-07-29

## 2019-07-29 RX ORDER — FENTANYL CITRATE 50 UG/ML
25-50 INJECTION, SOLUTION INTRAMUSCULAR; INTRAVENOUS
Status: DISCONTINUED | OUTPATIENT
Start: 2019-07-29 | End: 2019-07-29 | Stop reason: HOSPADM

## 2019-07-29 RX ORDER — CELECOXIB 200 MG/1
200 CAPSULE ORAL ONCE
Status: COMPLETED | OUTPATIENT
Start: 2019-07-29 | End: 2019-07-29

## 2019-07-29 RX ORDER — LIDOCAINE 40 MG/G
CREAM TOPICAL
Status: DISCONTINUED | OUTPATIENT
Start: 2019-07-29 | End: 2019-07-29 | Stop reason: HOSPADM

## 2019-07-29 RX ORDER — EPHEDRINE SULFATE 50 MG/ML
INJECTION, SOLUTION INTRAVENOUS PRN
Status: DISCONTINUED | OUTPATIENT
Start: 2019-07-29 | End: 2019-07-29

## 2019-07-29 RX ORDER — DEXAMETHASONE SODIUM PHOSPHATE 4 MG/ML
INJECTION, SOLUTION INTRA-ARTICULAR; INTRALESIONAL; INTRAMUSCULAR; INTRAVENOUS; SOFT TISSUE PRN
Status: DISCONTINUED | OUTPATIENT
Start: 2019-07-29 | End: 2019-07-29

## 2019-07-29 RX ORDER — ONDANSETRON 4 MG/1
4 TABLET, ORALLY DISINTEGRATING ORAL EVERY 30 MIN PRN
Status: DISCONTINUED | OUTPATIENT
Start: 2019-07-29 | End: 2019-07-29 | Stop reason: HOSPADM

## 2019-07-29 RX ORDER — OMEGA-3 FATTY ACIDS/FISH OIL 300-1000MG
200 CAPSULE ORAL EVERY 4 HOURS PRN
COMMUNITY

## 2019-07-29 RX ORDER — FENTANYL CITRATE 50 UG/ML
25-50 INJECTION, SOLUTION INTRAMUSCULAR; INTRAVENOUS EVERY 5 MIN PRN
Status: DISCONTINUED | OUTPATIENT
Start: 2019-07-29 | End: 2019-07-29 | Stop reason: HOSPADM

## 2019-07-29 RX ORDER — ONDANSETRON 2 MG/ML
INJECTION INTRAMUSCULAR; INTRAVENOUS PRN
Status: DISCONTINUED | OUTPATIENT
Start: 2019-07-29 | End: 2019-07-29

## 2019-07-29 RX ORDER — PHENYLEPHRINE HYDROCHLORIDE 10 MG/ML
INJECTION INTRAVENOUS PRN
Status: DISCONTINUED | OUTPATIENT
Start: 2019-07-29 | End: 2019-07-29

## 2019-07-29 RX ORDER — FENTANYL CITRATE 50 UG/ML
INJECTION, SOLUTION INTRAMUSCULAR; INTRAVENOUS PRN
Status: DISCONTINUED | OUTPATIENT
Start: 2019-07-29 | End: 2019-07-29

## 2019-07-29 RX ORDER — LIDOCAINE HYDROCHLORIDE AND EPINEPHRINE BITARTRATE 20; .01 MG/ML; MG/ML
INJECTION, SOLUTION SUBCUTANEOUS PRN
Status: DISCONTINUED | OUTPATIENT
Start: 2019-07-29 | End: 2019-07-29

## 2019-07-29 RX ADMIN — Medication 5 ML: at 13:47

## 2019-07-29 RX ADMIN — MIDAZOLAM HYDROCHLORIDE 1 MG: 1 INJECTION, SOLUTION INTRAMUSCULAR; INTRAVENOUS at 14:19

## 2019-07-29 RX ADMIN — FENTANYL CITRATE 100 MCG: 50 INJECTION, SOLUTION INTRAMUSCULAR; INTRAVENOUS at 14:19

## 2019-07-29 RX ADMIN — PHENYLEPHRINE HYDROCHLORIDE 200 MCG: 10 INJECTION INTRAVENOUS at 14:50

## 2019-07-29 RX ADMIN — EPHEDRINE SULFATE 10 MG: 50 INJECTION, SOLUTION INTRAVENOUS at 15:08

## 2019-07-29 RX ADMIN — EPHEDRINE SULFATE 10 MG: 50 INJECTION, SOLUTION INTRAVENOUS at 14:31

## 2019-07-29 RX ADMIN — ACETAMINOPHEN 975 MG: 325 TABLET, FILM COATED ORAL at 12:52

## 2019-07-29 RX ADMIN — MIDAZOLAM HYDROCHLORIDE 2 MG: 1 INJECTION, SOLUTION INTRAMUSCULAR; INTRAVENOUS at 13:36

## 2019-07-29 RX ADMIN — ONDANSETRON 4 MG: 2 INJECTION INTRAMUSCULAR; INTRAVENOUS at 15:01

## 2019-07-29 RX ADMIN — LIDOCAINE HYDROCHLORIDE 1 ML: 10 INJECTION, SOLUTION EPIDURAL; INFILTRATION; INTRACAUDAL; PERINEURAL at 12:52

## 2019-07-29 RX ADMIN — CELECOXIB 200 MG: 200 CAPSULE ORAL at 12:52

## 2019-07-29 RX ADMIN — PHENYLEPHRINE HYDROCHLORIDE 100 MCG: 10 INJECTION INTRAVENOUS at 14:31

## 2019-07-29 RX ADMIN — PHENYLEPHRINE HYDROCHLORIDE 100 MCG: 10 INJECTION INTRAVENOUS at 14:37

## 2019-07-29 RX ADMIN — Medication 3 G: at 14:15

## 2019-07-29 RX ADMIN — DEXAMETHASONE SODIUM PHOSPHATE 8 MG: 4 INJECTION, SOLUTION INTRA-ARTICULAR; INTRALESIONAL; INTRAMUSCULAR; INTRAVENOUS; SOFT TISSUE at 14:37

## 2019-07-29 RX ADMIN — FENTANYL CITRATE 100 MCG: 50 INJECTION, SOLUTION INTRAMUSCULAR; INTRAVENOUS at 13:36

## 2019-07-29 RX ADMIN — SODIUM CHLORIDE, POTASSIUM CHLORIDE, SODIUM LACTATE AND CALCIUM CHLORIDE 1000 ML: 600; 310; 30; 20 INJECTION, SOLUTION INTRAVENOUS at 12:51

## 2019-07-29 RX ADMIN — FENTANYL CITRATE 50 MCG: 50 INJECTION, SOLUTION INTRAMUSCULAR; INTRAVENOUS at 13:41

## 2019-07-29 RX ADMIN — PHENYLEPHRINE HYDROCHLORIDE 200 MCG: 10 INJECTION INTRAVENOUS at 14:58

## 2019-07-29 RX ADMIN — SODIUM CHLORIDE, POTASSIUM CHLORIDE, SODIUM LACTATE AND CALCIUM CHLORIDE: 600; 310; 30; 20 INJECTION, SOLUTION INTRAVENOUS at 14:54

## 2019-07-29 RX ADMIN — ROCURONIUM BROMIDE 50 MG: 10 INJECTION INTRAVENOUS at 14:20

## 2019-07-29 RX ADMIN — EPHEDRINE SULFATE 10 MG: 50 INJECTION, SOLUTION INTRAVENOUS at 14:44

## 2019-07-29 RX ADMIN — SUGAMMADEX 200 MG: 100 INJECTION, SOLUTION INTRAVENOUS at 15:16

## 2019-07-29 RX ADMIN — PROPOFOL 200 MG: 10 INJECTION, EMULSION INTRAVENOUS at 14:19

## 2019-07-29 RX ADMIN — PHENYLEPHRINE HYDROCHLORIDE 100 MCG: 10 INJECTION INTRAVENOUS at 14:44

## 2019-07-29 RX ADMIN — EPHEDRINE SULFATE 5 MG: 50 INJECTION, SOLUTION INTRAVENOUS at 14:58

## 2019-07-29 RX ADMIN — ROPIVACAINE HYDROCHLORIDE 30 ML: 5 INJECTION, SOLUTION EPIDURAL; INFILTRATION; PERINEURAL at 13:48

## 2019-07-29 RX ADMIN — GABAPENTIN 300 MG: 300 CAPSULE ORAL at 12:52

## 2019-07-29 RX ADMIN — MIDAZOLAM HYDROCHLORIDE 2 MG: 1 INJECTION, SOLUTION INTRAMUSCULAR; INTRAVENOUS at 13:41

## 2019-07-29 RX ADMIN — LIDOCAINE HYDROCHLORIDE 50 MG: 10 INJECTION, SOLUTION EPIDURAL; INFILTRATION; INTRACAUDAL; PERINEURAL at 14:19

## 2019-07-29 RX ADMIN — PHENYLEPHRINE HYDROCHLORIDE 100 MCG: 10 INJECTION INTRAVENOUS at 15:08

## 2019-07-29 ASSESSMENT — MIFFLIN-ST. JEOR: SCORE: 2095.35

## 2019-07-29 NOTE — OP NOTE
Procedure Date: 07/29/2019      PREOPERATIVE DIAGNOSES:  Large rotator cuff tear, right shoulder, with acromioclavicular arthrosis, subacromial impingement syndrome.      POSTOPERATIVE DIAGNOSES:  Large rotator cuff tear, right shoulder, with acromioclavicular arthrosis, subacromial impingement syndrome.      PROCEDURES:  Mini open right shoulder large rotator cuff repair, distal clavicle excision, subacromial decompression.      SURGEON:  Ky Bartlett MD      ASSISTANT:  Matt Moise PA-C      ANESTHESIA:  General.      ESTIMATED BLOOD LOSS:  Minimal.      COMPLICATIONS:  None apparent.      INDICATIONS:  Ludin is a healthy, very large 64-year-old male who dislocated his right shoulder and also sustained a rotator cuff tear.  After alternatives, risks, and possible complications were very carefully discussed, the patient desired surgical intervention; therefore, consent was obtained.      DESCRIPTION OF PROCEDURE:  The patient was brought to main operating room and after general anesthesia was obtained, was placed in a beach chair position.  Two grams of Ancef was given intravenously.  The right upper extremity was prepped and draped in the usual sterile fashion.      Oblique incision was made for an anterolateral approach to the right shoulder.  Subcutaneous tissue was divided with cautery, deep fascia incised in line with the incision and then a Weitlaner retractor was placed.  We elevated a small portion of the deltoid off the anterior acromion and distal clavicle.  It was obvious he had a very overriding clavicle, it was very oblique in articulation with the acromion of longstanding nature.  A very large osteophyte was noted.      Therefore, I placed a channel retractor underneath the acromion, resected the anterior and inferior osteophytes with an oscillating saw.  Removed bony fragments, smoothed all surfaces with a rasp.  Wound was irrigated out.      Split was made within the deltoid fibers.  A large  cuff tear was noted involving the entire supraspinatus and infraspinatus.  Biceps tendon was nonvisible.  Tendon was nicely flexible, though it was thin.  There was some horizontal component to it.  The tendon was trimmed back as far as I dared possible after breaking down adhesions with a Sha retractor and still able to repair it.  Therefore, I made a bleeding bony trough in the greater tuberosity and utilized the Arthrex SpeedBridge repair with 2 medial anchors and 2 lateral anchors.  Utilizing FiberTape crisscrossed fashion to repair the tendon down beautifully.  I did advance the tendon as far as I dared, getting the suture as proximal as possible.      Therefore, I placed baby Hohmann retractors around the distal clavicle, excising the bone until I had a roughly perpendicular articulation.  Wound was irrigated out, removed deep retractors and repaired deltoid with #1 Stratafix, closed subcutaneous tissue with 2-0 Stratafix, completed closure with 3-0 Stratafix.  Dermabond was placed over the skin followed by sterile compression bandage and a shoulder immobilizer.  The patient was awakened, extubated, returned to PAR in stable condition, without apparent complication.      I will hold physical therapy for 2 weeks secondary to the relatively thin tissue, though bone was good and I did have a good repair.  I anticipate 2 weeks, initiating therapy at that stage.         SILVANO SOARES MD             D: 2019   T: 2019   MT: JOSÉ MIGUEL      Name:     RUTH LOPEZ   MRN:      [id number]        Account:        [id number]   :      1955           Procedure Date: 2019      Document: [id number]

## 2019-07-29 NOTE — ANESTHESIA CARE TRANSFER NOTE
Patient: Ludin Lance    Procedure(s):  Distal clavicle excision,subacromial decompression, rotator cuff repair.    Diagnosis: Right shoulder arthritis  Diagnosis Additional Information: No value filed.    Anesthesia Type:   No value filed.     Note:  Airway :Nasal Cannula  Patient transferred to:PACU  Handoff Report: Identifed the Patient, Identified the Reponsible Provider, Reviewed the pertinent medical history, Discussed the surgical course, Reviewed Intra-OP anesthesia mangement and issues during anesthesia, Set expectations for post-procedure period and Allowed opportunity for questions and acknowledgement of understanding      Vitals: (Last set prior to Anesthesia Care Transfer)    CRNA VITALS  7/29/2019 1455 - 7/29/2019 1555      7/29/2019             Pulse:  91    SpO2:  100 %                Electronically Signed By: LEE Eckert CRNA  July 29, 2019  4:12 PM

## 2019-07-29 NOTE — DISCHARGE INSTRUCTIONS
Same Day Surgery Discharge Instructions  Special Precautions After Surgery - Adult    1. It is not unusual to feel lightheaded or faint, up to 24 hours after surgery or while taking pain medication.  If you have these symptoms; sit for a few minutes before standing and have someone assist you when getting up.  2. You should rest and relax for the next 24 hours and must have someone stay with you for at least 24 hours after your discharge.  3. DO NOT DRIVE any vehicle or operate mechanical equipment for 24 hours following the end of your surgery.  DO NOT DRIVE while taking narcotic pain medications that have been prescribed by your physician.  If you had a limb operated on, you must be able to use it fully to drive.  4. DO NOT drink alcoholic beverages for 24 hours following surgery or while taking prescription pain medication.  5. Drink clear liquids (apple juice, ginger ale, broth, 7-Up, etc.).  Progress to your regular diet as you feel able.  6. Any questions call your physician and do not make important decisions for 24 hours.    ACTIVITY  ? Restrictions: Wear the sling except when bathing. Keep arm supported with a pillow in bed or while up in the chair.     INCISIONAL CARE  ? Keep incision dry for 48 hours.  ? Keep extremity elevated above the level of the heart if possible.  Check color and feeling of right fingers.  ? Apply ice 1/2 hour on and 1/2 hour off for first 72 hours.  ? Be alert for signs of infection:  redness, swelling, heat, drainage of pus, and/or elevated temperature.  Contact your doctor if these occur.        Call for an appointment to return to the clinic in 10-14 days.    Medications:  ? Acetaminophen & Oxycodone (Percocet):  Next dose: start tonight and stay on schedule.  ? Hydroxyzine (Vistaril):  Next dose: as needed for nausea/itching/tohelp pain pill work better.  ? Follow the instructions on the bottle.     Additional discharge instructions: See Block  instructions.  __________________________________________________________________________________________________________________________________  IMPORTANT NUMBERS:    [facility] [place]  [phone], [phone]  Pharmacy:  [phone]  Same Day Surgery:  [phone], Monday - Friday until 8:30 p.m.  Urgent Care:  [phone]  Emergency Room:  [phone] [place] [address] Clinic:  [phone]                                                                             [facility]:  [phone] option [address] [place] [address] Orthopedics:  [phone]     [facility]:  [phone]   Surgery Specialty Clinic:  [phone]   Home Medical Equipment: [phone]  [facility]:  [phone]

## 2019-07-29 NOTE — ANESTHESIA POSTPROCEDURE EVALUATION
Patient: Ludin Lance    Procedure(s):  Distal clavicle excision,subacromial decompression, rotator cuff repair.    Diagnosis:Right shoulder arthritis  Diagnosis Additional Information: No value filed.    Anesthesia Type:  No value filed.    Note:  Anesthesia Post Evaluation    Patient location during evaluation: Bedside  Patient participation: Able to fully participate in evaluation  Level of consciousness: awake and alert  Pain management: adequate  Airway patency: patent  Cardiovascular status: acceptable  Respiratory status: acceptable  Hydration status: acceptable  PONV: none     Anesthetic complications: None          Last vitals:  Vitals:    07/29/19 1530 07/29/19 1545 07/29/19 1600   BP:  (!) 140/85 (!) 139/90   Pulse:  82 86   Resp: 11 17 18   Temp:   36.6  C (97.8  F)   SpO2: 96% 96% 95%         Electronically Signed By: LEE Eckert CRNA  July 29, 2019  4:12 PM

## 2019-07-29 NOTE — BRIEF OP NOTE
[facility]    Brief Operative Note    Pre-operative diagnosis: Right shoulder arthritis  Post-operative diagnosis Right Shoulder Rotator Cuff Tear  Procedure: Procedure(s):  Distal clavicle excision,subacromial decompression, rotator cuff repair.  Surgeon: Surgeon(s) and Role:     * Ky Bartlett MD - Primary     * Gamaliel Blackmon PA-C - Assisting  Anesthesia: General   Estimated blood loss: Less than 10 ml  Drains: None  Specimens: * No specimens in log *  Findings:   None.  Complications: None.  Implants:    Implant Name Type Inv. Item Serial No.  Lot No. LRB No. Used   IMP ANCHOR [employer] SPEEDBRDG BIOCOMP [id number] Metallic Hardware/[place] IMP ANCHOR [employer] SPEEDBRDG BIOCOMP [id number]  ARTHREX [id number] Right 1

## 2019-07-29 NOTE — ANESTHESIA PROCEDURE NOTES
Peripheral nerve/Neuraxial procedure note : Interscalene  Pre-Procedure  Performed by  Day Ibrahim   Location: pre-op    Procedure Times:7/29/2019 1:31 PM and 7/29/2019 1:56 PM  Pre-Anesthestic Checklist: patient identified, IV checked, site marked, risks and benefits discussed, informed consent, monitors and equipment checked, pre-op evaluation, at physician/surgeon's request and post-op pain management    Timeout  Correct Patient: Yes   Correct Procedure: Yes   Correct Site: Yes   Correct Laterality: Yes   Correct Position: Yes   Site Marked: Yes   .   Procedure Documentation    Diagnosis:PAIN.    Procedure:  right  Interscalene.  Local skin infiltrated with 3 mL of 1% lidocaine.     Ultrasound used to identify targeted nerve, plexus, or vascular marker and placed a needle adjacent to it., Ultrasound was used to visualize the spread of the anesthetic in close proximity to the above stated nerve. A permanent image is entered into the patient's record.  Patient Prep;mask, sterile gloves, chlorhexidine gluconate and isopropyl alcohol, patient draped.  Nerve Stim: Initial Level 1 mA.  Lowest motor response 0.28 mA..  Needle: short bevel Needle Gauge: 22.  Needle Length (millimeters) 40  Insertion Method: Single Shot.       Assessment/Narrative  Paresthesias: No.  Injection made incrementally with aspirations every 5 mL..  The placement was negative for: blood aspirated, painful injection and site bleeding.  Bolus given via needle. No blood aspirated via catheter.   Secured via.   Complications: none. Test dose of 5 mL lidocaine 2% w/ 1:200,000 epinephrine at 13:47. . 7/29/2019 1:48 PM

## 2019-11-03 ENCOUNTER — HEALTH MAINTENANCE LETTER (OUTPATIENT)
Age: 64
End: 2019-11-03

## 2020-02-10 ENCOUNTER — HEALTH MAINTENANCE LETTER (OUTPATIENT)
Age: 65
End: 2020-02-10

## 2020-06-19 DIAGNOSIS — I49.3 FREQUENT PVCS: ICD-10-CM

## 2020-09-21 DIAGNOSIS — I49.3 FREQUENT PVCS: ICD-10-CM

## 2020-09-21 RX ORDER — METOPROLOL SUCCINATE 25 MG/1
25 TABLET, EXTENDED RELEASE ORAL DAILY
Qty: 30 TABLET | Refills: 0 | Status: SHIPPED | OUTPATIENT
Start: 2020-09-21 | End: 2020-10-22

## 2020-10-22 ENCOUNTER — OFFICE VISIT (OUTPATIENT)
Dept: CARDIOLOGY | Facility: CLINIC | Age: 65
End: 2020-10-22
Payer: COMMERCIAL

## 2020-10-22 VITALS
BODY MASS INDEX: 43.56 KG/M2 | SYSTOLIC BLOOD PRESSURE: 142 MMHG | OXYGEN SATURATION: 98 % | HEART RATE: 68 BPM | TEMPERATURE: 96.7 F | WEIGHT: 295 LBS | DIASTOLIC BLOOD PRESSURE: 80 MMHG

## 2020-10-22 DIAGNOSIS — I49.3 PVC'S (PREMATURE VENTRICULAR CONTRACTIONS): Primary | ICD-10-CM

## 2020-10-22 PROCEDURE — 99213 OFFICE O/P EST LOW 20 MIN: CPT | Performed by: NURSE PRACTITIONER

## 2020-10-22 RX ORDER — METOPROLOL SUCCINATE 25 MG/1
25 TABLET, EXTENDED RELEASE ORAL DAILY
Qty: 90 TABLET | Refills: 3 | Status: SHIPPED | OUTPATIENT
Start: 2020-10-22 | End: 2021-10-07

## 2020-10-22 NOTE — LETTER
10/22/2020    Castro Lomas MD  [facility] [address] [facility] [address]    RE: Ludin Lance       Dear Colleague,    I had the pleasure of seeing Ludin Lance in the [facility].    Cardiology Clinic Progress Note  Ludin Lance MRN# [id number]   YOB: 1955 Age: 63 year old     Primary Cardiologist:   Dr. Leigh           History of Presenting Illness:    Ludin Lance is a pleasant 63 year old patient with   1. PVCs   Past medical history significant for EDUARDO (CPAP compliant) and unruptured intracranial aneurysm.      Pt was seen in the ED for chest discomfort and found to have equivocal stress test. He was transferred to [facility] for further evaluation.  He was incidentally found to have an intracranial aneurysm on head CT (done for blurry vision) and follow-up with neurosurgery was recommended. CT coronary angiogram showed a calcium score of 0 but cannot visualize portions of the coronary circulation.  He ultimately underwent coronary angiogram on 4/5/2019 with normal coronary arteries. He was found to have frequent PVCs during stress testing and with angiogram. Stress echocardiogram showed normal LVEF 55-60%. He was also noted to have elevated blood pressures in the ER and during hospitalization they were better controlled at 130s.    At follow-up, he had no further chest discomfort and denied palpitations with his frequent PVCs. 48 hour Holter monitor 5/1/2019 showed sinus rhythm with first-degree AV block, average heart rate 86, 22% PVC burden, rare PACs.  He was started on metoprolol.  Repeat Holter monitor, after starting metoprolol, 5/29/2019 showing sinus rhythm with average heart rate 68, 11% PVC burden and occasional PACs, no events were reported.  He followed up with sleep medicine and no changes were made to his CPAP machine.  Patient noted weight increase of approximately 10 pounds after starting metoprolol but had  also been having higher caloric intake.    Pt presents today for medication renewal.  He was last seen in June 2019 and 6-month follow-up was recommended.  However, patient is now following up 16 months later as he needs medication refills.  Weights have been stable and he does not report any increased weight after continuing on metoprolol.  Blood pressure today is mildly elevated even on recheck.  He will start monitoring these at home and let us know if they remain elevated.  He continues to deny any palpitations with his PVCs. Patient reports no chest pain, shortness of breath, PND, orthopnea, presyncope, syncope, edema, heart racing, or palpitations.    Current Cardiac Medications   Metoprolol XL 25 mg daily                   Assessment and Plan:     Plan  Check blood pressure at least 1 hour after medications. Call the clinic if your blood pressure is consistently greater than 130/80.     Follow-up:  See Dr. Leigh for cardiology follow up at [facility]: Oct 2021.       1. PVCs    EF 55-60% on stress echo 4/2019 without any significant structural abnormalities noted     Frequent PVCs on stress echo and angiogram     holter with 22% PVC burden decreased to 11% after starting metoprolol     Asymptomatic     Continue metoprolol    Ultimately, if not tolerating medication will send EP for consideration of ablation         Thank you for allowing me to participate in this delightful patient's care.      This note was completed in part using Dragon voice recognition software. Although reviewed after completion, some word and grammatical errors may occur.    Makeda Barriga, LEE, CNP         Data:   All laboratory data reviewed        HPI and Plan:   See dictation    Orders Placed This Encounter   Procedures     Follow-Up with Cardiologist       Orders Placed This Encounter   Medications     metoprolol succinate ER (TOPROL-XL) 25 MG 24 hr tablet     Sig: Take 1 tablet (25 mg) by mouth daily      Dispense:  90 tablet     Refill:  3       Medications Discontinued During This Encounter   Medication Reason     metoprolol succinate ER (TOPROL-XL) 25 MG 24 hr tablet Reorder         Encounter Diagnosis   Name Primary?     PVC's (premature ventricular contractions) Yes       CURRENT MEDICATIONS:  Current Outpatient Medications   Medication Sig Dispense Refill     acetaminophen (TYLENOL) 325 MG tablet Take 2 tablets (650 mg) by mouth every 4 hours as needed for mild pain 100 tablet 0     ibuprofen (ADVIL/MOTRIN) 200 MG capsule Take 200 mg by mouth every 4 hours as needed for fever       metoprolol succinate ER (TOPROL-XL) 25 MG 24 hr tablet Take 1 tablet (25 mg) by mouth daily 90 tablet 3     ORDER FOR DME Equipment being ordered: Respironics Auto CPAP pressure 7-12 .         ALLERGIES     Allergies   Allergen Reactions     Nkda [No Known Drug Allergies]        PAST MEDICAL HISTORY:  Past Medical History:   Diagnosis Date     Sleep apnea        PAST SURGICAL HISTORY:  Past Surgical History:   Procedure Laterality Date     ARTHROPLASTY HIP Right 7/9/2018    Procedure: ARTHROPLASTY HIP;  Right Total Hip Arthroplasty;  Surgeon: Ky Bartlett MD;  Location: [facility] OR     ARTHROTOMY SHOULDER, ROTATOR CUFF REPAIR, COMBINED Right 7/29/2019    Procedure: Distal clavicle excision,subacromial decompression, rotator cuff repair.;  Surgeon: Ky Bartlett MD;  Location: [facility] OR     AS REMOVE TONSILS/ADENOIDS,<13 Y/O       C TOTAL KNEE ARTHROPLASTY Right 2010     CV HEART CATHETERIZATION WITH POSSIBLE INTERVENTION N/A 4/5/2019    Procedure: Heart Catheterization with possible Intervention;  Surgeon: Bobo Corado MD;  Location:  HEART CARDIAC CATH LAB     CV LEFT VENTRICULOGRAM N/A 4/5/2019    Procedure: Left Ventriculogram;  Surgeon: Bobo Corado MD;  Location:  HEART CARDIAC CATH LAB       FAMILY HISTORY:  Family History   Problem Relation Age of Onset     Breast Cancer Mother      Heart Disease  Father      Myocardial Infarction Father 59        MI ages 59, 72     Alcoholism Brother        SOCIAL HISTORY:  Social History     Socioeconomic History     Marital status:      Spouse name: None     Number of children: None     Years of education: None     Highest education level: None   Occupational History     None   Social Needs     Financial resource strain: None     Food insecurity     Worry: None     Inability: None     Transportation needs     Medical: None     Non-medical: None   Tobacco Use     Smoking status: Never Smoker     Smokeless tobacco: Never Used   Substance and Sexual Activity     Alcohol use: Yes     Alcohol/week: 2.0 standard drinks     Types: 1 Glasses of wine, 1 Shots of liquor per week     Frequency: 2-4 times a month     Drinks per session: 1 or 2     Binge frequency: Less than monthly     Comment: A few beers per month     Drug use: No     Sexual activity: None   Lifestyle     Physical activity     Days per week: None     Minutes per session: None     Stress: None   Relationships     Social connections     Talks on phone: None     Gets together: None     Attends [employer] service: None     Active member of club or organization: None     Attends meetings of clubs or organizations: None     Relationship status: None     Intimate partner violence     Fear of current or ex partner: None     Emotionally abused: None     Physically abused: None     Forced sexual activity: None   Other Topics Concern     None   Social History Narrative    Is a pianist, 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        Review of Systems:  Skin:  Negative       Eyes:  Negative      ENT:  Negative      Respiratory:  Negative       Cardiovascular:  Negative      Gastroenterology: Negative      Genitourinary:  Negative      Musculoskeletal:  Negative      Neurologic:  Negative      Psychiatric:  Positive for sleep disturbances    Heme/Lymph/Imm:  Negative      Endocrine:  Negative        Physical Exam:  Vitals: BP (!)  142/80   Pulse 68   Temp 96.7  F (35.9  C)   Wt 133.8 kg (295 lb)   SpO2 98%   BMI 43.56 kg/m       Constitutional:  cooperative, alert and oriented, well developed, well nourished, in no acute distress, obese    Skin:  warm and dry to the touch         Head:  normocephalic       Eyes:  pupils equal and round       ENT:  no pallor or cyanosis       Neck:  no stiffness       Respiratory:  clear to auscultation; normal symmetry        Cardiac: regular rate and rhythm; normal S1 and S2                 pulses full and equal       GI:  abdomen soft, nondistended     Extremities and Muscular Skeletal:  no edema        Neurological:  affect appropriate; no gross motor deficits       Psych:  Alert and Oriented x 3 , appropriate affact      Constitutional:           Skin:             Head:           Eyes:           Lymph:      ENT:           Neck:           Respiratory:            Cardiac:                                                           GI:           Extremities and Muscular Skeletal:                 Neurological:           Psych:           Thank you for allowing me to participate in the care of your patient.    Sincerely,     LEE Vazquez [facility]

## 2020-10-22 NOTE — PROGRESS NOTES
Cardiology Clinic Progress Note  Ludin Lance MRN# [id number]   YOB: 1955 Age: 63 year old     Primary Cardiologist:   Dr. Leigh           History of Presenting Illness:    Ludin Lance is a pleasant 63 year old patient with   1. PVCs   Past medical history significant for EDUARDO (CPAP compliant) and unruptured intracranial aneurysm.      Pt was seen in the ED for chest discomfort and found to have equivocal stress test. He was transferred to [facility] for further evaluation.  He was incidentally found to have an intracranial aneurysm on head CT (done for blurry vision) and follow-up with neurosurgery was recommended. CT coronary angiogram showed a calcium score of 0 but cannot visualize portions of the coronary circulation.  He ultimately underwent coronary angiogram on 4/5/2019 with normal coronary arteries. He was found to have frequent PVCs during stress testing and with angiogram. Stress echocardiogram showed normal LVEF 55-60%. He was also noted to have elevated blood pressures in the ER and during hospitalization they were better controlled at 130s.    At follow-up, he had no further chest discomfort and denied palpitations with his frequent PVCs. 48 hour Holter monitor 5/1/2019 showed sinus rhythm with first-degree AV block, average heart rate 86, 22% PVC burden, rare PACs.  He was started on metoprolol.  Repeat Holter monitor, after starting metoprolol, 5/29/2019 showing sinus rhythm with average heart rate 68, 11% PVC burden and occasional PACs, no events were reported.  He followed up with sleep medicine and no changes were made to his CPAP machine.  Patient noted weight increase of approximately 10 pounds after starting metoprolol but had also been having higher caloric intake.    Pt presents today for medication renewal.  He was last seen in June 2019 and 6-month follow-up was recommended.  However, patient is now following up 16 months later as he needs medication refills.  Weights  have been stable and he does not report any increased weight after continuing on metoprolol.  Blood pressure today is mildly elevated even on recheck.  He will start monitoring these at home and let us know if they remain elevated.  He continues to deny any palpitations with his PVCs. Patient reports no chest pain, shortness of breath, PND, orthopnea, presyncope, syncope, edema, heart racing, or palpitations.    Current Cardiac Medications   Metoprolol XL 25 mg daily                   Assessment and Plan:     Plan  Check blood pressure at least 1 hour after medications. Call the clinic if your blood pressure is consistently greater than 130/80.     Follow-up:  See Dr. Leigh for cardiology follow up at [facility]: Oct 2021.       1. PVCs    EF 55-60% on stress echo 4/2019 without any significant structural abnormalities noted     Frequent PVCs on stress echo and angiogram     holter with 22% PVC burden decreased to 11% after starting metoprolol     Asymptomatic     Continue metoprolol    Ultimately, if not tolerating medication will send EP for consideration of ablation         Thank you for allowing me to participate in this delightful patient's care.      This note was completed in part using Dragon voice recognition software. Although reviewed after completion, some word and grammatical errors may occur.    Makeda Barriga, APRN, CNP           Data:   All laboratory data reviewed        HPI and Plan:   See dictation    Orders Placed This Encounter   Procedures     Follow-Up with Cardiologist       Orders Placed This Encounter   Medications     metoprolol succinate ER (TOPROL-XL) 25 MG 24 hr tablet     Sig: Take 1 tablet (25 mg) by mouth daily     Dispense:  90 tablet     Refill:  3       Medications Discontinued During This Encounter   Medication Reason     metoprolol succinate ER (TOPROL-XL) 25 MG 24 hr tablet Reorder         Encounter Diagnosis   Name Primary?     PVC's (premature ventricular  contractions) Yes       CURRENT MEDICATIONS:  Current Outpatient Medications   Medication Sig Dispense Refill     acetaminophen (TYLENOL) 325 MG tablet Take 2 tablets (650 mg) by mouth every 4 hours as needed for mild pain 100 tablet 0     ibuprofen (ADVIL/MOTRIN) 200 MG capsule Take 200 mg by mouth every 4 hours as needed for fever       metoprolol succinate ER (TOPROL-XL) 25 MG 24 hr tablet Take 1 tablet (25 mg) by mouth daily 90 tablet 3     ORDER FOR DME Equipment being ordered: Respironics Auto CPAP pressure 7-12 .         ALLERGIES     Allergies   Allergen Reactions     Nkda [No Known Drug Allergies]        PAST MEDICAL HISTORY:  Past Medical History:   Diagnosis Date     Sleep apnea        PAST SURGICAL HISTORY:  Past Surgical History:   Procedure Laterality Date     ARTHROPLASTY HIP Right 7/9/2018    Procedure: ARTHROPLASTY HIP;  Right Total Hip Arthroplasty;  Surgeon: Ky Bartlett MD;  Location: [facility] OR     ARTHROTOMY SHOULDER, ROTATOR CUFF REPAIR, COMBINED Right 7/29/2019    Procedure: Distal clavicle excision,subacromial decompression, rotator cuff repair.;  Surgeon: Ky Bartlett MD;  Location: [facility] OR     AS REMOVE TONSILS/ADENOIDS,<11 Y/O       C TOTAL KNEE ARTHROPLASTY Right 2010     CV HEART CATHETERIZATION WITH POSSIBLE INTERVENTION N/A 4/5/2019    Procedure: Heart Catheterization with possible Intervention;  Surgeon: Bobo Corado MD;  Location:  HEART CARDIAC CATH LAB     CV LEFT VENTRICULOGRAM N/A 4/5/2019    Procedure: Left Ventriculogram;  Surgeon: Bobo Corado MD;  Location:  HEART CARDIAC CATH LAB       FAMILY HISTORY:  Family History   Problem Relation Age of Onset     Breast Cancer Mother      Heart Disease Father      Myocardial Infarction Father 59        MI ages 59, 72     Alcoholism Brother        SOCIAL HISTORY:  Social History     Socioeconomic History     Marital status:      Spouse name: None     Number of children: None     Years of education:  None     Highest education level: None   Occupational History     None   Social Needs     Financial resource strain: None     Food insecurity     Worry: None     Inability: None     Transportation needs     Medical: None     Non-medical: None   Tobacco Use     Smoking status: Never Smoker     Smokeless tobacco: Never Used   Substance and Sexual Activity     Alcohol use: Yes     Alcohol/week: 2.0 standard drinks     Types: 1 Glasses of wine, 1 Shots of liquor per week     Frequency: 2-4 times a month     Drinks per session: 1 or 2     Binge frequency: Less than monthly     Comment: A few beers per month     Drug use: No     Sexual activity: None   Lifestyle     Physical activity     Days per week: None     Minutes per session: None     Stress: None   Relationships     Social connections     Talks on phone: None     Gets together: None     Attends Zoroastrian service: None     Active member of club or organization: None     Attends meetings of clubs or organizations: None     Relationship status: None     Intimate partner violence     Fear of current or ex partner: None     Emotionally abused: None     Physically abused: None     Forced sexual activity: None   Other Topics Concern     None   Social History Narrative    Is a pianist, 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        Review of Systems:  Skin:  Negative       Eyes:  Negative      ENT:  Negative      Respiratory:  Negative       Cardiovascular:  Negative      Gastroenterology: Negative      Genitourinary:  Negative      Musculoskeletal:  Negative      Neurologic:  Negative      Psychiatric:  Positive for sleep disturbances    Heme/Lymph/Imm:  Negative      Endocrine:  Negative        Physical Exam:  Vitals: BP (!) 142/80   Pulse 68   Temp 96.7  F (35.9  C)   Wt 133.8 kg (295 lb)   SpO2 98%   BMI 43.56 kg/m       Constitutional:  cooperative, alert and oriented, well developed, well nourished, in no acute distress, obese    Skin:  warm and dry to the touch          Head:  normocephalic       Eyes:  pupils equal and round       ENT:  no pallor or cyanosis       Neck:  no stiffness       Respiratory:  clear to auscultation; normal symmetry        Cardiac: regular rate and rhythm; normal S1 and S2                 pulses full and equal       GI:  abdomen soft, nondistended     Extremities and Muscular Skeletal:  no edema        Neurological:  affect appropriate; no gross motor deficits       Psych:  Alert and Oriented x 3 , appropriate affact      Constitutional:           Skin:             Head:           Eyes:           Lymph:      ENT:           Neck:           Respiratory:            Cardiac:                                                           GI:           Extremities and Muscular Skeletal:                 Neurological:           Psych:           CC  Chanell Leigh MD  [address]  [place] [address]

## 2020-10-22 NOTE — PATIENT INSTRUCTIONS
Medication Changes:  None     Recommendations:  1. Call if palpitations start   2. Check blood pressure at least 1 hour after medications. Call the clinic if your blood pressure is consistently greater than 130/80.     Follow-up:  See Dr. Leigh for cardiology follow up at [facility]: Oct 2021. Call in July to schedule.     Cardiology Scheduling~[phone]  Cardiology Clinic RNs~[phone] (Aniya Patel RN and Deidra Sandhu RN)

## 2020-11-16 ENCOUNTER — HEALTH MAINTENANCE LETTER (OUTPATIENT)
Age: 65
End: 2020-11-16

## 2021-04-03 ENCOUNTER — HEALTH MAINTENANCE LETTER (OUTPATIENT)
Age: 66
End: 2021-04-03

## 2021-09-18 ENCOUNTER — HEALTH MAINTENANCE LETTER (OUTPATIENT)
Age: 66
End: 2021-09-18

## 2021-10-07 DIAGNOSIS — I49.3 PVC'S (PREMATURE VENTRICULAR CONTRACTIONS): ICD-10-CM

## 2021-10-07 RX ORDER — METOPROLOL SUCCINATE 25 MG/1
25 TABLET, EXTENDED RELEASE ORAL DAILY
Qty: 90 TABLET | Refills: 0 | Status: SHIPPED | OUTPATIENT
Start: 2021-10-07 | End: 2022-01-05

## 2022-01-05 DIAGNOSIS — I49.3 PVC'S (PREMATURE VENTRICULAR CONTRACTIONS): ICD-10-CM

## 2022-01-05 RX ORDER — METOPROLOL SUCCINATE 25 MG/1
25 TABLET, EXTENDED RELEASE ORAL DAILY
Qty: 90 TABLET | Refills: 0 | Status: SHIPPED | OUTPATIENT
Start: 2022-01-05 | End: 2023-01-02

## 2022-04-24 ENCOUNTER — HEALTH MAINTENANCE LETTER (OUTPATIENT)
Age: 67
End: 2022-04-24

## 2022-11-19 ENCOUNTER — HEALTH MAINTENANCE LETTER (OUTPATIENT)
Age: 67
End: 2022-11-19

## 2023-01-02 DIAGNOSIS — I49.3 PVC'S (PREMATURE VENTRICULAR CONTRACTIONS): ICD-10-CM

## 2023-01-02 RX ORDER — METOPROLOL SUCCINATE 25 MG/1
25 TABLET, EXTENDED RELEASE ORAL DAILY
Qty: 30 TABLET | Refills: 0 | Status: SHIPPED | OUTPATIENT
Start: 2023-01-02 | End: 2023-02-06

## 2023-01-02 NOTE — TELEPHONE ENCOUNTER
[facility] Cardiology Refill Guideline reviewed.  Medication does not meet criteria for refill due to overdue for follow up.  Messaged to providers team for further review. 30 day fill sent and message to scheduling to reach out to patient. Deidra Sandhu RN Cardiology January 2, 2023, 2:54 PM'

## 2023-01-02 NOTE — TELEPHONE ENCOUNTER
Last Office Visit: 10/22/20 GIANFRANCO Antonio  Next Office Visit: TBD  Last Fill Date: 10/05/22  Meghan Epstein MA Cardiology   1/2/2023 2:35 PM

## 2023-02-06 DIAGNOSIS — I49.3 PVC'S (PREMATURE VENTRICULAR CONTRACTIONS): Primary | ICD-10-CM

## 2023-02-06 DIAGNOSIS — R07.89 OTHER CHEST PAIN: ICD-10-CM

## 2023-02-06 DIAGNOSIS — I49.3 PVC'S (PREMATURE VENTRICULAR CONTRACTIONS): ICD-10-CM

## 2023-02-06 RX ORDER — METOPROLOL SUCCINATE 25 MG/1
25 TABLET, EXTENDED RELEASE ORAL DAILY
Qty: 90 TABLET | Refills: 0 | Status: SHIPPED | OUTPATIENT
Start: 2023-02-06 | End: 2023-04-21

## 2023-02-06 NOTE — PROGRESS NOTES
Jonas Mars MD Scorsone, Thresa K, RN  Yes I would like to get an echo.       ----- Message -----   From: Rosa Weeks RN   Sent: 2/6/2023   9:02 AM CST   To: Jonas Mars MD   Subject: Echo                                             Questioning need for Echo. Patient new to you but has been seen in the past. Echo and cath in 2019 without intervention but borderline reduced EF. Would you like one prior to the visit in April?     Thank you,     Rosa Weeks RN      Orders for echo in. Scheduling sent message to perform before updated appt in April. Rosa Weeks RN

## 2023-02-06 NOTE — CONFIDENTIAL NOTE
[facility] Cardiology Refill Guideline reviewed.  Medication meets criteria for refill.     Appointment made for next available MD d/t protocol of MD [place] every 3 years with Dr. Mars April 21st. 3 Month supply granted until updated visit for assessment of medication need.     Rosa Weeks RN

## 2023-02-06 NOTE — TELEPHONE ENCOUNTER
LF: 1/2/23  Qty: 30  Last Cardiology Visit: 10/22/20   Next Scheduled Cardiology Visit: none scheduled

## 2023-04-11 ENCOUNTER — HOSPITAL ENCOUNTER (OUTPATIENT)
Dept: CARDIOLOGY | Facility: CLINIC | Age: 68
Discharge: HOME | End: 2023-04-11
Attending: INTERNAL MEDICINE | Admitting: INTERNAL MEDICINE
Payer: COMMERCIAL

## 2023-04-11 DIAGNOSIS — R07.89 OTHER CHEST PAIN: ICD-10-CM

## 2023-04-11 DIAGNOSIS — I49.3 PVC'S (PREMATURE VENTRICULAR CONTRACTIONS): ICD-10-CM

## 2023-04-11 LAB — LVEF ECHO: NORMAL

## 2023-04-11 PROCEDURE — 255N000002 HC RX 255 OP 636: Performed by: INTERNAL MEDICINE

## 2023-04-11 PROCEDURE — 999N000208 ECHOCARDIOGRAM COMPLETE

## 2023-04-11 PROCEDURE — 93306 TTE W/DOPPLER COMPLETE: CPT | Mod: 26 | Performed by: INTERNAL MEDICINE

## 2023-04-11 RX ADMIN — HUMAN ALBUMIN MICROSPHERES AND PERFLUTREN 2 ML: 10; .22 INJECTION, SOLUTION INTRAVENOUS at 08:15

## 2023-04-20 VITALS — HEART RATE: 54 BPM | WEIGHT: 288 LBS | BODY MASS INDEX: 42.53 KG/M2

## 2023-04-20 NOTE — PROGRESS NOTES
Aurelio is a 67 year old who is being evaluated via a billable video visit.      Please send link to [email or web address]    How would you like to obtain your AVS? MyChart  If the video visit is dropped, the invitation should be resent by: Text to cell phone: [phone]  Will anyone else be joining your video visit? No        Video-Visit Details    Type of service:  Video Visit     Originating Location (pt. Location): Home    Distant Location (provider location):  On-site  Platform used for Video Visit: [facility]    CARDIOLOGY CLINIC CONSULTATION    PRIMARY CARE PHYSICIAN:  Castro Lomas    Tests reviewed/interpreted independently in clinic today:   1. EKG: Sinus rhythm  2. Echocardiogram: Normal ejection fraction.  No significant valvular disease     The level of medical decision making during this visit was of low complexity.    HISTORY OF PRESENT ILLNESS:  Today, I had the pleasure of connecting with Ludin Lance.  He is a 59-year-old gentleman with past medical history of PVCs who presents to the clinic for follow-up visit after 2 years.  He was previously seen by my colleague Dr. Pulido and Makeda Antonio.  He initially presented with chest pain and work-up at that time included a CT coronary angiogram and left heart catheterization both of which showed no evidence of coronary artery disease.  He was started on metoprolol for frequent PVCs noted on rhythm monitor and during angiogram.  He has done well since and does not have any complaints.  The reason for his presentation to clinic today is to obtain refill of metoprolol.  He recently went to [place] where he and his family visited [facility].  He was able to walk without any difficulty and tells me that he walked over 60 miles during a 5-day trip.  He denies palpitations, shortness of breath, orthopnea, PND, lower extremity edema, presyncope or syncope.    PAST MEDICAL HISTORY:  Past Medical History:   Diagnosis Date     Sleep apnea         MEDICATIONS:  Current Outpatient Medications   Medication     acetaminophen (TYLENOL) 325 MG tablet     ibuprofen (ADVIL/MOTRIN) 200 MG capsule     metoprolol succinate ER (TOPROL XL) 25 MG 24 hr tablet     ORDER FOR DME     No current facility-administered medications for this visit.       ALLERGIES:  Allergies   Allergen Reactions     Nkda [No Known Drug Allergies]        SOCIAL HISTORY:  I have reviewed this patient's social history and updated it with pertinent information if needed. Ludin Lance  reports that he has never smoked. He has never used smokeless tobacco. He reports current alcohol use of about 2.0 standard drinks of alcohol per week. He reports that he does not use drugs.    FAMILY HISTORY:  I have reviewed this patient's family history and updated it with pertinent information if needed.   Family History   Problem Relation Age of Onset     Breast Cancer Mother      Heart Disease Father      Myocardial Infarction Father 59        MI ages 59, 72     Alcoholism Brother        REVIEW OF SYSTEMS:  Skin:        Eyes:       ENT:       Respiratory:  Positive for sleep apnea;CPAP  Cardiovascular:  Negative    Gastroenterology:      Genitourinary:       Musculoskeletal:       Neurologic:       Psychiatric:       Heme/Lymph/Imm:       Endocrine:           PHYSICAL EXAM:                     Vital Signs with Ranges     288 lbs 0 oz    Constitutional: alert, no distress  Respiratory: No stridor  Cardiovascular: No JVD  GI: nondistended  Neuropsychiatric: appropriate affact    ASSESSMENT: Pertinent issues addressed/ reviewed during this cardiology visit    1. Normal coronaries on angiogram  2. PVCs-asymptomatic  3. Obesity    RECOMMENDATIONS:  1. He will continue metoprolol uninterrupted.  I have provided refills and sent to his pharmacy.  2. I will have him come back and see us in 2 years.    It was a pleasure seeing this patient in clinic today. Please do not hesitate to contact me with any future  questions.     Jonas MAGANA, FACC, FASE  Cardiology - [facility]  April 21, 2023    This note was completed in part using dictation via the Dragon voice recognition software. Some word and grammatical errors may occur and must be interpreted in the appropriate clinical context.  If there are any questions pertaining to this issue, please contact me for further clarification.

## 2023-04-21 ENCOUNTER — VIRTUAL VISIT (OUTPATIENT)
Dept: CARDIOLOGY | Facility: CLINIC | Age: 68
End: 2023-04-21
Payer: COMMERCIAL

## 2023-04-21 DIAGNOSIS — I49.3 PVC'S (PREMATURE VENTRICULAR CONTRACTIONS): ICD-10-CM

## 2023-04-21 PROCEDURE — 99213 OFFICE O/P EST LOW 20 MIN: CPT | Mod: VID | Performed by: INTERNAL MEDICINE

## 2023-04-21 RX ORDER — METOPROLOL SUCCINATE 25 MG/1
25 TABLET, EXTENDED RELEASE ORAL DAILY
Qty: 90 TABLET | Refills: 0 | Status: SHIPPED | OUTPATIENT
Start: 2023-04-21 | End: 2023-07-31

## 2023-04-21 NOTE — LETTER
4/21/2023    Castro Lomas MD  [facility] [address] [address]    RE: uLdin Lance       Dear Colleague,     I had the pleasure of seeing Ludin Lance in the [facility].  Aurelio is a 67 year old who is being evaluated via a billable video visit.      Please send link to [email or web address]    How would you like to obtain your AVS? MyChart  If the video visit is dropped, the invitation should be resent by: Text to cell phone: [phone]  Will anyone else be joining your video visit? No        Video-Visit Details    Type of service:  Video Visit     Originating Location (pt. Location): Home    Distant Location (provider location):  On-site  Platform used for Video Visit: [facility]    CARDIOLOGY CLINIC CONSULTATION    PRIMARY CARE PHYSICIAN:  Castro Lomas    Tests reviewed/interpreted independently in clinic today:   EKG: Sinus rhythm  Echocardiogram: Normal ejection fraction.  No significant valvular disease     The level of medical decision making during this visit was of low complexity.    HISTORY OF PRESENT ILLNESS:  Today, I had the pleasure of connecting with Ludin Lance.  He is a 59-year-old gentleman with past medical history of PVCs who presents to the clinic for follow-up visit after 2 years.  He was previously seen by my colleague Dr. Pulido and Makeda Antonio.  He initially presented with chest pain and work-up at that time included a CT coronary angiogram and left heart catheterization both of which showed no evidence of coronary artery disease.  He was started on metoprolol for frequent PVCs noted on rhythm monitor and during angiogram.  He has done well since and does not have any complaints.  The reason for his presentation to clinic today is to obtain refill of metoprolol.  He recently went to [place] where he and his family visited [facility].  He was able to walk without any difficulty and tells me that he walked over 60  miles during a 5-day trip.  He denies palpitations, shortness of breath, orthopnea, PND, lower extremity edema, presyncope or syncope.    PAST MEDICAL HISTORY:  Past Medical History:   Diagnosis Date    Sleep apnea        MEDICATIONS:  Current Outpatient Medications   Medication    acetaminophen (TYLENOL) 325 MG tablet    ibuprofen (ADVIL/MOTRIN) 200 MG capsule    metoprolol succinate ER (TOPROL XL) 25 MG 24 hr tablet    ORDER FOR DME     No current facility-administered medications for this visit.       ALLERGIES:  Allergies   Allergen Reactions    Nkda [No Known Drug Allergies]        SOCIAL HISTORY:  I have reviewed this patient's social history and updated it with pertinent information if needed. Ludin Lance  reports that he has never smoked. He has never used smokeless tobacco. He reports current alcohol use of about 2.0 standard drinks of alcohol per week. He reports that he does not use drugs.    FAMILY HISTORY:  I have reviewed this patient's family history and updated it with pertinent information if needed.   Family History   Problem Relation Age of Onset    Breast Cancer Mother     Heart Disease Father     Myocardial Infarction Father 59        MI ages 59, 72    Alcoholism Brother        REVIEW OF SYSTEMS:  Skin:        Eyes:       ENT:       Respiratory:  Positive for sleep apnea;CPAP  Cardiovascular:  Negative    Gastroenterology:      Genitourinary:       Musculoskeletal:       Neurologic:       Psychiatric:       Heme/Lymph/Imm:       Endocrine:           PHYSICAL EXAM:                     Vital Signs with Ranges     288 lbs 0 oz    Constitutional: alert, no distress  Respiratory: No stridor  Cardiovascular: No JVD  GI: nondistended  Neuropsychiatric: appropriate affact    ASSESSMENT: Pertinent issues addressed/ reviewed during this cardiology visit    Normal coronaries on angiogram  PVCs-asymptomatic  Obesity    RECOMMENDATIONS:  He will continue metoprolol uninterrupted.  I have provided  refills and sent to his pharmacy.  I will have him come back and see us in 2 years.    It was a pleasure seeing this patient in clinic today. Please do not hesitate to contact me with any future questions.     Jonas MAGANA, FACC, [facility]  Cardiology - [facility]  April 21, 2023    This note was completed in part using dictation via the Dragon voice recognition software. Some word and grammatical errors may occur and must be interpreted in the appropriate clinical context.  If there are any questions pertaining to this issue, please contact me for further clarification.        Thank you for allowing me to participate in the care of your patient.      Sincerely,     Jonas Mars MD     [facility]  cc:   No referring provider defined for this encounter.

## 2023-06-01 ENCOUNTER — HEALTH MAINTENANCE LETTER (OUTPATIENT)
Age: 68
End: 2023-06-01

## 2023-07-31 DIAGNOSIS — I49.3 PVC'S (PREMATURE VENTRICULAR CONTRACTIONS): ICD-10-CM

## 2023-07-31 RX ORDER — METOPROLOL SUCCINATE 25 MG/1
25 TABLET, EXTENDED RELEASE ORAL DAILY
Qty: 90 TABLET | Refills: 3 | Status: SHIPPED | OUTPATIENT
Start: 2023-07-31 | End: 2024-08-05

## 2023-07-31 NOTE — TELEPHONE ENCOUNTER
Last Office Visit: 04/21/23 Dr. Mars  Next Office Visit: TBD  Last Fill Date: 04/21/23  Meghan Epstein MA Cardiology   7/31/2023 8:40 AM

## 2023-07-31 NOTE — TELEPHONE ENCOUNTER
[facility] Cardiology Refill Guideline reviewed.  Medication meets criteria for refill.    Janny Mccain RN

## 2023-08-07 ENCOUNTER — APPOINTMENT (OUTPATIENT)
Dept: CT IMAGING | Facility: CLINIC | Age: 68
End: 2023-08-07
Attending: EMERGENCY MEDICINE
Payer: COMMERCIAL

## 2023-08-07 ENCOUNTER — HOSPITAL ENCOUNTER (EMERGENCY)
Facility: CLINIC | Age: 68
Discharge: HOME | End: 2023-08-07
Attending: EMERGENCY MEDICINE | Admitting: EMERGENCY MEDICINE
Payer: COMMERCIAL

## 2023-08-07 VITALS
OXYGEN SATURATION: 98 % | BODY MASS INDEX: 42.23 KG/M2 | HEART RATE: 69 BPM | TEMPERATURE: 98.4 F | WEIGHT: 295 LBS | DIASTOLIC BLOOD PRESSURE: 100 MMHG | SYSTOLIC BLOOD PRESSURE: 186 MMHG | HEIGHT: 70 IN | RESPIRATION RATE: 18 BRPM

## 2023-08-07 DIAGNOSIS — M54.2 ACUTE NECK PAIN: ICD-10-CM

## 2023-08-07 PROCEDURE — 72125 CT NECK SPINE W/O DYE: CPT

## 2023-08-07 PROCEDURE — 99284 EMERGENCY DEPT VISIT MOD MDM: CPT | Mod: 25 | Performed by: EMERGENCY MEDICINE

## 2023-08-07 PROCEDURE — 99284 EMERGENCY DEPT VISIT MOD MDM: CPT | Performed by: EMERGENCY MEDICINE

## 2023-08-07 PROCEDURE — 70450 CT HEAD/BRAIN W/O DYE: CPT

## 2023-08-07 RX ORDER — CYCLOBENZAPRINE HCL 10 MG
10 TABLET ORAL 3 TIMES DAILY PRN
Qty: 10 TABLET | Refills: 0 | Status: SHIPPED | OUTPATIENT
Start: 2023-08-07

## 2023-08-07 RX ORDER — OXYCODONE HYDROCHLORIDE 5 MG/1
5 TABLET ORAL EVERY 6 HOURS PRN
Qty: 10 TABLET | Refills: 0 | Status: SHIPPED | OUTPATIENT
Start: 2023-08-07 | End: 2023-08-10

## 2023-08-07 ASSESSMENT — ACTIVITIES OF DAILY LIVING (ADL)
ADLS_ACUITY_SCORE: 39
ADLS_ACUITY_SCORE: 39

## 2023-08-07 NOTE — ED NOTES
"Pt tripped yesterday and fell catching himself with his hands and side. Complains of neck pain, nausea, and headache. Pt denies hitting his head. Pt states that he is wearing sunglasses due to after the fall started seeing \"floaters\" in his right eye and is now sensitive to the light. Pt cannot turn his head to the left or right with out pain. C-collar applied to pt.   "

## 2023-08-07 NOTE — ED TRIAGE NOTES
Severe neck pain started yesterday and has gradually worsened. Pt fell yesterday onto knee/hand, did not hit head. Denies cervical tenderness, c/o right sided neck pain     Triage Assessment       Row Name 08/07/23 1405       Triage Assessment (Adult)    Airway WDL WDL       Respiratory WDL    Respiratory WDL WDL       Skin Circulation/Temperature WDL    Skin Circulation/Temperature WDL WDL       Cardiac WDL    Cardiac WDL WDL       Peripheral/Neurovascular WDL    Peripheral Neurovascular WDL WDL       Cognitive/Neuro/Behavioral WDL    Cognitive/Neuro/Behavioral WDL WDL

## 2023-08-07 NOTE — ED PROVIDER NOTES
History     Chief Complaint   Patient presents with    Neck Pain     Severe neck pain started yesterday and has gradually worsened. Pt fell yesterday onto knee/hand, did not hit head. Denies cervical tenderness, c/o right sided neck pain     HPI  Ludin Lance is a 68 year old male with history of obesity, sleep apnea, cerebral aneurysm without rupture, hypertension, who presents emergency department for neck pain in setting of fall.  Patient reports that he is a  and he was at a show last night and he tripped over some cables falling forward.  Fell down onto his knees and arms.  Did not hit his head.  But was quite a jolt.  This morning when he woke up he had significant pain in his neck on the sides from the tops of his shoulders to behind his ears.  Significant pain with trying to turn his head at all.  He has mild headache and nausea. Visual floaters in left eye. Otherwise feeling well.     The patient's PMHx, Surgical Hx, Allergies, and Medications were all reviewed with the patient.    Allergies:  Allergies   Allergen Reactions    Nkda [No Known Drug Allergy]        Problem List:    Patient Active Problem List    Diagnosis Date Noted    Morbid obesity (H) 06/17/2019     Priority: Medium    Frequent PVCs 06/17/2019     Priority: Medium    Chest pain 04/02/2019     Priority: Medium    Headache 04/02/2019     Priority: Medium    Hypertension 04/02/2019     Priority: Medium    Cerebral aneurysm without rupture 04/02/2019     Priority: Medium     Noted on CTA 4/2/2019 - 3mm of distal left A3 segment      Bacterial conjunctivitis of left eye 07/12/2018     Priority: Medium    Slow transit constipation 07/11/2018     Priority: Medium    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     Priority: Medium    EDUARDO (obstructive sleep apnea) 12/02/2014     Priority: Medium     Severe EDUARDO with significant desaturations (HST on 11/29/2014 with AHI 72.8, sanjay desat 69%)            Past Medical History:    Past Medical History:  "  Diagnosis Date    Sleep apnea        Past Surgical History:    Past Surgical History:   Procedure Laterality Date    ARTHROPLASTY HIP Right 7/9/2018    Procedure: ARTHROPLASTY HIP;  Right Total Hip Arthroplasty;  Surgeon: Ky Bartlett MD;  Location: [facility] OR    ARTHROTOMY SHOULDER, ROTATOR CUFF REPAIR, COMBINED Right 7/29/2019    Procedure: Distal clavicle excision,subacromial decompression, rotator cuff repair.;  Surgeon: Ky Bartlett MD;  Location: [facility] OR    AS REMOVE TONSILS/ADENOIDS,<13 Y/O      CV HEART CATHETERIZATION WITH POSSIBLE INTERVENTION N/A 4/5/2019    Procedure: Heart Catheterization with possible Intervention;  Surgeon: Bobo Corado MD;  Location:  HEART CARDIAC CATH LAB    CV LEFT VENTRICULOGRAM N/A 4/5/2019    Procedure: Left Ventriculogram;  Surgeon: Bobo Corado MD;  Location:  HEART CARDIAC CATH LAB    [facility] TOTAL KNEE ARTHROPLASTY Right 2010       Family History:    Family History   Problem Relation Age of Onset    Breast Cancer Mother     Heart Disease Father     Myocardial Infarction Father 59        MI ages 59, 72    Alcoholism Brother        Social History:  Marital Status:   [2]  Social History     Tobacco Use    Smoking status: Never    Smokeless tobacco: Never   Substance Use Topics    Alcohol use: Yes     Alcohol/week: 2.0 standard drinks of alcohol     Types: 1 Glasses of wine, 1 Shots of liquor per week     Comment: A few beers per month    Drug use: No        Medications:    cyclobenzaprine (FLEXERIL) 10 MG tablet  oxyCODONE (ROXICODONE) 5 MG tablet  acetaminophen (TYLENOL) 325 MG tablet  ibuprofen (ADVIL/MOTRIN) 200 MG capsule  metoprolol succinate ER (TOPROL XL) 25 MG 24 hr tablet  ORDER FOR DME          Review of Systems  Pertinent positives and negatives mentioned in HPI    Physical Exam   BP: (!) 186/100  Pulse: 69  Temp: 98.4  F (36.9  C)  Resp: 18  Height: 177.8 cm (5' 10\")  Weight: 133.8 kg (295 lb)  SpO2: 98 %    Physical Exam  GEN: " Awake, alert, and cooperative.  Wearing sunglasses indoors.  C-collar in place  HENT: MMM. External ears and nose normal bilaterally.  No hemotympanum or otorrhea.  No retroauricular ecchymosis or tenderness.  No obvious facial trauma.  EYES: EOM intact. Conjunctiva clear. No discharge.   NECK: C-collar in place.  Mid cervical tenderness midline.  CV : Extremities warm and well perfused  PULM: Normal effort.  Speaking in full sentences   NEURO: Normal speech. Following commands. CN II-XII grossly intact. Answering questions and interacting appropriately. No focal motor or sensory deficit in upper or lower extremities.   EXT: No gross deformity. Warm and well perfused.  INT: Warm. No diaphoresis. Normal color.        ED Course        Procedures         Critical Care time:  none               Results for orders placed or performed during the hospital encounter of 08/07/23 (from the past 24 hour(s))   Cervical spine CT w/o contrast    Narrative    EXAM: CT CERVICAL SPINE W/O CONTRAST, CT HEAD W/O CONTRAST  LOCATION: [facility]  DATE: 8/7/2023    INDICATION: fall last evening, neck pain this morning, unable to turn head w out significant discomfort, midline tenderness, neuro intact  COMPARISON: 04/02/2019  TECHNIQUE:   1) Routine CT Head without IV contrast. Multiplanar reformats. Dose reduction techniques were used.  2) Routine CT Cervical Spine without IV contrast. Multiplanar reformats. Dose reduction techniques were used.    FINDINGS:   HEAD CT:   INTRACRANIAL CONTENTS: No intracranial hemorrhage, extraaxial collection, or mass effect.  No CT evidence of acute infarct. Normal parenchymal attenuation. Normal ventricles and sulci.     VISUALIZED ORBITS/SINUSES/MASTOIDS: No intraorbital abnormality. No paranasal sinus mucosal disease. No middle ear or mastoid effusion.    BONES/SOFT TISSUES: No scalp hematoma. No skull fracture.    CERVICAL SPINE CT:   VERTEBRA: There is grade 1  anterolisthesis of C4 on C5. Normal vertebral body heights. No fracture or posttraumatic subluxation.     CANAL/FORAMINA: There is multilevel disc height loss, uncovertebral hypertrophy, and facet hypertrophy. No significant spinal canal stenosis. Moderate to severe neural foraminal stenosis at C3-C4 through C6-C7.    PARASPINAL: No extraspinal abnormality. Visualized lung fields are clear.      Impression    IMPRESSION:  HEAD CT:  1.  No acute intracranial process.    CERVICAL SPINE CT:  1.  No CT evidence for acute fracture or post traumatic subluxation.   Head CT w/o contrast    Narrative    EXAM: CT CERVICAL SPINE W/O CONTRAST, CT HEAD W/O CONTRAST  LOCATION: [facility]  DATE: 8/7/2023    INDICATION: fall last evening, neck pain this morning, unable to turn head w out significant discomfort, midline tenderness, neuro intact  COMPARISON: 04/02/2019  TECHNIQUE:   1) Routine CT Head without IV contrast. Multiplanar reformats. Dose reduction techniques were used.  2) Routine CT Cervical Spine without IV contrast. Multiplanar reformats. Dose reduction techniques were used.    FINDINGS:   HEAD CT:   INTRACRANIAL CONTENTS: No intracranial hemorrhage, extraaxial collection, or mass effect.  No CT evidence of acute infarct. Normal parenchymal attenuation. Normal ventricles and sulci.     VISUALIZED ORBITS/SINUSES/MASTOIDS: No intraorbital abnormality. No paranasal sinus mucosal disease. No middle ear or mastoid effusion.    BONES/SOFT TISSUES: No scalp hematoma. No skull fracture.    CERVICAL SPINE CT:   VERTEBRA: There is grade 1 anterolisthesis of C4 on C5. Normal vertebral body heights. No fracture or posttraumatic subluxation.     CANAL/FORAMINA: There is multilevel disc height loss, uncovertebral hypertrophy, and facet hypertrophy. No significant spinal canal stenosis. Moderate to severe neural foraminal stenosis at C3-C4 through C6-C7.    PARASPINAL: No extraspinal abnormality.  Visualized lung fields are clear.      Impression    IMPRESSION:  HEAD CT:  1.  No acute intracranial process.    CERVICAL SPINE CT:  1.  No CT evidence for acute fracture or post traumatic subluxation.       Medications - No data to display    Assessments & Plan (with Medical Decision Making)   68 year old male with headache, nausea, and neck pain after slip and fall last night. No direct head trauma per patient report. No signs of traumatic injury on exam. C-collar applied in triage.  CT head without contrast with no acute exam process.  CT cervical spine with no evidence of acute fracture or posttraumatic subluxation.  Collar removed.  Pain most prominent on examination and distribution of SCM, right greater than left.  Plan for symptomatic treatment with ibuprofen and Tylenol for primary pain can troll and oxycodone and cyclobenzaprine for severe pain.  Also recommended warm packs.  To follow-up in clinic if symptoms not improving.  To return to emergency department for worsening symptoms or any new or concerning symptoms that develop.  He was eager to be discharged to home.    I have reviewed the nursing notes.         Discharge Medication List as of 8/7/2023  8:41 PM        START taking these medications    Details   cyclobenzaprine (FLEXERIL) 10 MG tablet Take 1 tablet (10 mg) by mouth 3 times daily as needed for muscle spasms, Disp-10 tablet, R-0, E-Prescribe      oxyCODONE (ROXICODONE) 5 MG tablet Take 1 tablet (5 mg) by mouth every 6 hours as needed for pain, Disp-10 tablet, R-0, E-Prescribe             Final diagnoses:   Acute neck pain     Obey Kang MD    8/7/2023   [facility] EMERGENCY DEPT    Disclaimer: This note consists of words and symbols derived from keyboarding and dictation using voice recognition software.  As a result, there may be errors that have gone undetected.  Please consider this when interpreting information found in this note.               Obey Kang  MD MIELDA  08/08/23 6637

## 2023-08-08 NOTE — DISCHARGE INSTRUCTIONS
Imaging of your neck was reassuring.  You have a muscle strain.  As we discussed this will take several days to improve.  Ibuprofen Tylenol for primary pain control.  You may take oxycodone for severe pain.  When the muscles feel tight you may take cyclobenzaprine.  Avoid taking cyclobenzaprine and oxycodone at the same time as both can be sedating.  Be certain not to drive or operate a motor vehicle while taking these medications.    Follow-up in clinic if not feeling better by Monday.  Return to emergency department for any uncontrollable pain, or if you develop any numbness or weakness in your arms or legs.    Please take the medication as prescribed. Please do not operate heavy machinery, drive a car, exercise, or perform important tasks while taking this medication as it can make you drowsy and prone to mental lapses. This medication can be addictive, so please only take as needed. This medication can cause your breathing to dangerous slow down if you take too much, so please take only as prescribed. This medication can make you constipated, so please stay well hydrated and take fiber supplements. Return to the emergency department if this medication does not control your pain and be sure to follow up with your primary care provider as advised.

## 2024-01-08 RX ORDER — METOPROLOL SUCCINATE 25 MG/1
25 TABLET, EXTENDED RELEASE ORAL DAILY
Qty: 30 TABLET | Refills: 11 | OUTPATIENT
Start: 2024-01-08

## 2024-06-16 ENCOUNTER — HEALTH MAINTENANCE LETTER (OUTPATIENT)
Age: 69
End: 2024-06-16

## 2024-08-05 DIAGNOSIS — I49.3 PVC'S (PREMATURE VENTRICULAR CONTRACTIONS): ICD-10-CM

## 2024-08-05 RX ORDER — METOPROLOL SUCCINATE 25 MG/1
25 TABLET, EXTENDED RELEASE ORAL DAILY
Qty: 90 TABLET | Refills: 3 | Status: SHIPPED | OUTPATIENT
Start: 2024-08-05

## 2024-08-05 NOTE — TELEPHONE ENCOUNTER
Last office visit: 04/21/2023 (Virtual visit With Doctor Jerad)  Next office visit: Needs to be scheduled  Last filled: 07/31/2023    Karen Diez MA, RMA  8/5/2024 8:05 AM

## 2024-08-05 NOTE — TELEPHONE ENCOUNTER
[facility] Cardiology Refill Guideline reviewed.  Medication meets criteria for refill. Follow up due 4/2025, but no order in EPIC as it was 2 years from last visit. Order placed and refill sent. Deidra Sandhu RN Cardiology August 5, 2024, 8:46 AM'

## 2024-10-15 ENCOUNTER — TRANSCRIBE ORDERS (OUTPATIENT)
Dept: OTHER | Age: 69
End: 2024-10-15

## 2024-10-15 DIAGNOSIS — Z12.11 SCREENING FOR COLON CANCER: Primary | ICD-10-CM

## 2024-10-21 ENCOUNTER — TRANSCRIBE ORDERS (OUTPATIENT)
Dept: OTHER | Age: 69
End: 2024-10-21

## 2024-10-21 DIAGNOSIS — Z12.11 SCREENING FOR COLON CANCER: Primary | ICD-10-CM

## 2025-06-29 ENCOUNTER — VIRTUAL VISIT (OUTPATIENT)
Dept: URGENT CARE | Facility: CLINIC | Age: 70
End: 2025-06-29
Payer: MEDICARE

## 2025-06-29 DIAGNOSIS — R06.2 WHEEZING: ICD-10-CM

## 2025-06-29 DIAGNOSIS — J06.9 UPPER RESPIRATORY INFECTION WITH COUGH AND CONGESTION: Primary | ICD-10-CM

## 2025-06-29 DIAGNOSIS — H10.33 ACUTE CONJUNCTIVITIS OF BOTH EYES, UNSPECIFIED ACUTE CONJUNCTIVITIS TYPE: ICD-10-CM

## 2025-06-29 PROCEDURE — 98001 SYNCH AUDIO-VIDEO NEW LOW 30: CPT

## 2025-06-29 RX ORDER — POLYMYXIN B SULFATE AND TRIMETHOPRIM 1; 10000 MG/ML; [USP'U]/ML
1-2 SOLUTION OPHTHALMIC EVERY 4 HOURS
Qty: 10 ML | Refills: 0 | Status: SHIPPED | OUTPATIENT
Start: 2025-06-29 | End: 2025-07-06

## 2025-06-29 RX ORDER — GUAIFENESIN 600 MG/1
1200 TABLET, EXTENDED RELEASE ORAL 2 TIMES DAILY
COMMUNITY
Start: 2025-06-29 | End: 2025-07-06

## 2025-06-29 RX ORDER — ALBUTEROL SULFATE 90 UG/1
2 INHALANT RESPIRATORY (INHALATION) EVERY 6 HOURS PRN
Qty: 18 G | Refills: 0 | Status: SHIPPED | OUTPATIENT
Start: 2025-06-29

## 2025-06-29 RX ORDER — INHALER, ASSIST DEVICES
SPACER (EA) MISCELLANEOUS
Qty: 1 EACH | Refills: 0 | Status: SHIPPED | OUTPATIENT
Start: 2025-06-29

## 2025-06-29 NOTE — PATIENT INSTRUCTIONS
No indication for antibiotics at this time; symptoms are consistent with a viral illness.    Recommended supportive care:  Acetaminophen or ibuprofen for headache and general symptom relief.    Use saline nasal spray and humidifier to help with congestion.    Eye discharge likely viral; advised gentle cleansing with warm water and hand hygiene. Prescription for Polytrim sent to pharmacy if you start to experience  eye itching, pain or redness,  worsening purulent discharge.    Wheezing may be due to post-viral airway reactivity  Recommended trial of albuterol inhaler, If wheezing worsens or persists, follow up in the clinic for further evaluation     Monitor symptoms and follow up if worsening, especially if fever develops, cough becomes severe or persistent, or new respiratory distress occurs.

## 2025-07-28 DIAGNOSIS — I49.3 PVC'S (PREMATURE VENTRICULAR CONTRACTIONS): ICD-10-CM

## 2025-07-28 RX ORDER — METOPROLOL SUCCINATE 25 MG/1
25 TABLET, EXTENDED RELEASE ORAL DAILY
Qty: 90 TABLET | Refills: 0 | Status: SHIPPED | OUTPATIENT
Start: 2025-07-28

## 2025-07-28 NOTE — TELEPHONE ENCOUNTER
Due for 2 year follow up. x1 kit refill. No further refills until seen by cardiology--call [phone] to schedule    [facility] Cardiology Refill Guideline reviewed.  Medication meets criteria for refill.    Janny Mccain RN

## 2025-07-28 NOTE — TELEPHONE ENCOUNTER
Last Office Visit: 4/21/23 (Jerad)  Next Office Visit: TBD  Last Fill Date: 5/4/25  Jackelin Richter LPN Cardiology   7/28/2025 9:26 AM

## (undated) DEVICE — CATH ANGIO INFINITI AL1 4FRX100CM 538445

## (undated) DEVICE — BLADE SAW OSCILLATING STRYK MED 9.0X25X0.38MM 2296-003-111

## (undated) DEVICE — GLOVE PROTEXIS BLUE W/NEU-THERA 8.5  2D73EB85

## (undated) DEVICE — WIRE GUIDE 0.035"X260CM SAFE-T-J EXCHANGE G00517

## (undated) DEVICE — GLOVE PROTEXIS W/NEU-THERA 8.5  2D73TE85

## (undated) DEVICE — CATH DIAG 4FR ANG PIG 538453S

## (undated) DEVICE — GOWN LG DISP 9515

## (undated) DEVICE — TOTE ANGIO CORP PC15AT SAN32CC83O

## (undated) DEVICE — BLADE KNIFE SURG 10 371110

## (undated) DEVICE — CATH ANGIO INFINITI JR4 4FRX100CM 538421

## (undated) DEVICE — STOCKING SLEEVE COMPRESSION CALF LG

## (undated) DEVICE — PACK SHOULDER

## (undated) DEVICE — PREP DURAPREP 26ML APL 8630

## (undated) DEVICE — SU VICRYL 1 CT-1 36" UND J947H

## (undated) DEVICE — DRAPE ARTHROSCOPY SHOULDER BEACHCHAIR 29369

## (undated) DEVICE — PACK TOTAL HIP W/POUCH RIVERSIDE LATEX FREE

## (undated) DEVICE — SU DERMABOND PRINEO 22CM CLR222US

## (undated) DEVICE — SOL WATER IRRIG 1000ML BOTTLE 07139-09

## (undated) DEVICE — SPONGE LAP 18X18" 1515

## (undated) DEVICE — NDL PERC ENTRY THINWALL 18GA 7.0" G00166

## (undated) DEVICE — GOWN XLG DISP 9545

## (undated) DEVICE — CATH ANGIO INFINITI 3DRC 4FRX100CM 538476

## (undated) DEVICE — BONE CLEANING TIP INTERPULSE  0210-010-000

## (undated) DEVICE — SU VICRYL 2-0 CT-1 36" UND J945H

## (undated) DEVICE — SOL NACL 0.9% IRRIG 1000ML BOTTLE 07138-09

## (undated) DEVICE — ADH SKIN CLOSURE PREMIERPRO EXOFIN 1.0ML 3470

## (undated) DEVICE — MANIFOLD KIT ANGIO AUTOMATED 014613

## (undated) DEVICE — SLING ARM XLG 79-99158

## (undated) DEVICE — DEFIB PRO-PADZ LVP LQD GEL ADULT 8900-2105-01

## (undated) DEVICE — SU PDO 1 STRATAFIX 36X36CM CTX TAPERPOINT SXPD2B405

## (undated) DEVICE — SUCTION IRR SYSTEM W/TIP INTERPULSE

## (undated) DEVICE — DRAPE IOBAN LG .375X23.5" 6648EZ

## (undated) DEVICE — SU STRATAFIX MONOCRYL 3-0 SPIRAL PS-2 30CM SXMP1B106

## (undated) DEVICE — PILLOW ABDUCT HIP LG

## (undated) DEVICE — GLOVE PROTEXIS W/NEU-THERA 8.0  2D73TE80

## (undated) DEVICE — SUCTION TIP POOLE K770

## (undated) DEVICE — KIT HAND CONTROL ANGIOTOUCH ACIST 65CM AT-P65

## (undated) DEVICE — GLOVE PROTEXIS W/NEU-THERA 7.5  2D73TE75

## (undated) DEVICE — NDL ARTHREX MULTIFIRE/FASTPASS SCORPION AR-13995N

## (undated) DEVICE — INTRO SHEATH 4FRX10CM PINNACLE RSS402

## (undated) DEVICE — SU NDL MAYO 1824-6

## (undated) DEVICE — CATH DIAG 4FR JL 4.5 538417

## (undated) DEVICE — DRAPE SHEET REV FOLD 3/4 9349

## (undated) DEVICE — SLING ARM XLG 0814-0065

## (undated) DEVICE — GLIDEWIRE STRAIGHT .035CM GR3501

## (undated) DEVICE — TAPE CLOTH ADHESIVE 3" ZONAS

## (undated) DEVICE — BLADE SAW SAGITTAL STRK 18X90X1.37MM HD SYS 6 6118-137-090

## (undated) DEVICE — SU PDO 3-0 STRATAFIX 24CM FS/FS REV CUT SXPD2B419

## (undated) RX ORDER — GABAPENTIN 300 MG/1
CAPSULE ORAL
Status: DISPENSED
Start: 2018-07-09

## (undated) RX ORDER — FENTANYL CITRATE 50 UG/ML
INJECTION, SOLUTION INTRAMUSCULAR; INTRAVENOUS
Status: DISPENSED
Start: 2019-04-05

## (undated) RX ORDER — PROPOFOL 10 MG/ML
INJECTION, EMULSION INTRAVENOUS
Status: DISPENSED
Start: 2018-07-09

## (undated) RX ORDER — PROPOFOL 10 MG/ML
INJECTION, EMULSION INTRAVENOUS
Status: DISPENSED
Start: 2019-07-29

## (undated) RX ORDER — PHENYLEPHRINE HCL IN 0.9% NACL 1 MG/10 ML
SYRINGE (ML) INTRAVENOUS
Status: DISPENSED
Start: 2019-07-29

## (undated) RX ORDER — METOPROLOL TARTRATE 1 MG/ML
INJECTION, SOLUTION INTRAVENOUS
Status: DISPENSED
Start: 2019-04-04

## (undated) RX ORDER — DEXAMETHASONE SODIUM PHOSPHATE 4 MG/ML
INJECTION, SOLUTION INTRA-ARTICULAR; INTRALESIONAL; INTRAMUSCULAR; INTRAVENOUS; SOFT TISSUE
Status: DISPENSED
Start: 2019-07-29

## (undated) RX ORDER — FENTANYL CITRATE 50 UG/ML
INJECTION, SOLUTION INTRAMUSCULAR; INTRAVENOUS
Status: DISPENSED
Start: 2019-07-29

## (undated) RX ORDER — GLYCOPYRROLATE 0.2 MG/ML
INJECTION, SOLUTION INTRAMUSCULAR; INTRAVENOUS
Status: DISPENSED
Start: 2019-07-29

## (undated) RX ORDER — DEXAMETHASONE SODIUM PHOSPHATE 4 MG/ML
INJECTION, SOLUTION INTRA-ARTICULAR; INTRALESIONAL; INTRAMUSCULAR; INTRAVENOUS; SOFT TISSUE
Status: DISPENSED
Start: 2018-07-09

## (undated) RX ORDER — EPHEDRINE SULFATE 50 MG/ML
INJECTION, SOLUTION INTRAMUSCULAR; INTRAVENOUS; SUBCUTANEOUS
Status: DISPENSED
Start: 2019-07-29

## (undated) RX ORDER — LIDOCAINE HYDROCHLORIDE 20 MG/ML
JELLY TOPICAL
Status: DISPENSED
Start: 2019-07-29

## (undated) RX ORDER — NITROGLYCERIN 0.4 MG/1
TABLET SUBLINGUAL
Status: DISPENSED
Start: 2019-04-04

## (undated) RX ORDER — ONDANSETRON 2 MG/ML
INJECTION INTRAMUSCULAR; INTRAVENOUS
Status: DISPENSED
Start: 2019-07-29

## (undated) RX ORDER — LIDOCAINE HYDROCHLORIDE 10 MG/ML
INJECTION, SOLUTION EPIDURAL; INFILTRATION; INTRACAUDAL; PERINEURAL
Status: DISPENSED
Start: 2019-04-05

## (undated) RX ORDER — EPHEDRINE SULFATE 50 MG/ML
INJECTION, SOLUTION INTRAVENOUS
Status: DISPENSED
Start: 2019-07-29

## (undated) RX ORDER — BUPIVACAINE HYDROCHLORIDE 5 MG/ML
INJECTION, SOLUTION EPIDURAL; INTRACAUDAL
Status: DISPENSED
Start: 2018-07-09

## (undated) RX ORDER — LIDOCAINE HYDROCHLORIDE 10 MG/ML
INJECTION, SOLUTION EPIDURAL; INFILTRATION; INTRACAUDAL; PERINEURAL
Status: DISPENSED
Start: 2019-07-29

## (undated) RX ORDER — CELECOXIB 200 MG/1
CAPSULE ORAL
Status: DISPENSED
Start: 2019-07-29

## (undated) RX ORDER — HEPARIN SODIUM 1000 [USP'U]/ML
INJECTION, SOLUTION INTRAVENOUS; SUBCUTANEOUS
Status: DISPENSED
Start: 2019-04-05

## (undated) RX ORDER — FENTANYL CITRATE 50 UG/ML
INJECTION, SOLUTION INTRAMUSCULAR; INTRAVENOUS
Status: DISPENSED
Start: 2018-07-09

## (undated) RX ORDER — ONDANSETRON 2 MG/ML
INJECTION INTRAMUSCULAR; INTRAVENOUS
Status: DISPENSED
Start: 2018-07-09

## (undated) RX ORDER — ACETAMINOPHEN 325 MG/1
TABLET ORAL
Status: DISPENSED
Start: 2019-07-29

## (undated) RX ORDER — GABAPENTIN 300 MG/1
CAPSULE ORAL
Status: DISPENSED
Start: 2019-07-29